# Patient Record
Sex: MALE | Race: WHITE | Employment: OTHER | URBAN - NONMETROPOLITAN AREA
[De-identification: names, ages, dates, MRNs, and addresses within clinical notes are randomized per-mention and may not be internally consistent; named-entity substitution may affect disease eponyms.]

---

## 2017-01-18 ENCOUNTER — OFFICE VISIT (OUTPATIENT)
Dept: FAMILY MEDICINE | Facility: OTHER | Age: 57
End: 2017-01-18
Attending: FAMILY MEDICINE
Payer: COMMERCIAL

## 2017-01-18 VITALS
WEIGHT: 225 LBS | SYSTOLIC BLOOD PRESSURE: 112 MMHG | BODY MASS INDEX: 29.82 KG/M2 | DIASTOLIC BLOOD PRESSURE: 72 MMHG | HEART RATE: 66 BPM | TEMPERATURE: 98.1 F | HEIGHT: 73 IN

## 2017-01-18 DIAGNOSIS — F17.201 TOBACCO ABUSE, IN REMISSION: ICD-10-CM

## 2017-01-18 DIAGNOSIS — N40.0 BENIGN NON-NODULAR PROSTATIC HYPERPLASIA, PRESENCE OF LOWER URINARY TRACT SYMPTOMS UNSPECIFIED: ICD-10-CM

## 2017-01-18 DIAGNOSIS — R35.1 NOCTURIA: ICD-10-CM

## 2017-01-18 DIAGNOSIS — Z71.89 ACP (ADVANCE CARE PLANNING): Chronic | ICD-10-CM

## 2017-01-18 DIAGNOSIS — E78.2 MIXED HYPERLIPIDEMIA: ICD-10-CM

## 2017-01-18 DIAGNOSIS — Z00.00 ROUTINE GENERAL MEDICAL EXAMINATION AT A HEALTH CARE FACILITY: Primary | ICD-10-CM

## 2017-01-18 LAB
ALBUMIN SERPL-MCNC: 4.2 G/DL (ref 3.4–5)
ALP SERPL-CCNC: 30 U/L (ref 40–150)
ALT SERPL W P-5'-P-CCNC: 35 U/L (ref 0–70)
ANION GAP SERPL CALCULATED.3IONS-SCNC: 9 MMOL/L (ref 3–14)
AST SERPL W P-5'-P-CCNC: 18 U/L (ref 0–45)
BASOPHILS # BLD AUTO: 0 10E9/L (ref 0–0.2)
BASOPHILS NFR BLD AUTO: 0.7 %
BILIRUB SERPL-MCNC: 0.5 MG/DL (ref 0.2–1.3)
BUN SERPL-MCNC: 19 MG/DL (ref 7–30)
CALCIUM SERPL-MCNC: 8.8 MG/DL (ref 8.5–10.1)
CHLORIDE SERPL-SCNC: 105 MMOL/L (ref 94–109)
CHOLEST SERPL-MCNC: 190 MG/DL
CO2 SERPL-SCNC: 27 MMOL/L (ref 20–32)
CREAT SERPL-MCNC: 1.11 MG/DL (ref 0.66–1.25)
DIFFERENTIAL METHOD BLD: NORMAL
EOSINOPHIL # BLD AUTO: 0.2 10E9/L (ref 0–0.7)
EOSINOPHIL NFR BLD AUTO: 3.9 %
ERYTHROCYTE [DISTWIDTH] IN BLOOD BY AUTOMATED COUNT: 12.5 % (ref 10–15)
GFR SERPL CREATININE-BSD FRML MDRD: 69 ML/MIN/1.7M2
GLUCOSE SERPL-MCNC: 96 MG/DL (ref 70–99)
HCT VFR BLD AUTO: 40 % (ref 40–53)
HDLC SERPL-MCNC: 59 MG/DL
HGB BLD-MCNC: 13.7 G/DL (ref 13.3–17.7)
IMM GRANULOCYTES # BLD: 0 10E9/L (ref 0–0.4)
IMM GRANULOCYTES NFR BLD: 0.2 %
LDLC SERPL CALC-MCNC: 109 MG/DL
LYMPHOCYTES # BLD AUTO: 1.8 10E9/L (ref 0.8–5.3)
LYMPHOCYTES NFR BLD AUTO: 29.9 %
MCH RBC QN AUTO: 31.1 PG (ref 26.5–33)
MCHC RBC AUTO-ENTMCNC: 34.3 G/DL (ref 31.5–36.5)
MCV RBC AUTO: 91 FL (ref 78–100)
MONOCYTES # BLD AUTO: 0.8 10E9/L (ref 0–1.3)
MONOCYTES NFR BLD AUTO: 12.8 %
NEUTROPHILS # BLD AUTO: 3.1 10E9/L (ref 1.6–8.3)
NEUTROPHILS NFR BLD AUTO: 52.5 %
NONHDLC SERPL-MCNC: 131 MG/DL
NRBC # BLD AUTO: 0 10*3/UL
NRBC BLD AUTO-RTO: 0 /100
PLATELET # BLD AUTO: 264 10E9/L (ref 150–450)
POTASSIUM SERPL-SCNC: 3.9 MMOL/L (ref 3.4–5.3)
PROT SERPL-MCNC: 7.6 G/DL (ref 6.8–8.8)
PSA SERPL-MCNC: 1.24 UG/L (ref 0–4)
RBC # BLD AUTO: 4.4 10E12/L (ref 4.4–5.9)
SODIUM SERPL-SCNC: 141 MMOL/L (ref 133–144)
TRIGL SERPL-MCNC: 108 MG/DL
WBC # BLD AUTO: 5.9 10E9/L (ref 4–11)

## 2017-01-18 PROCEDURE — 36415 COLL VENOUS BLD VENIPUNCTURE: CPT | Performed by: FAMILY MEDICINE

## 2017-01-18 PROCEDURE — 85025 COMPLETE CBC W/AUTO DIFF WBC: CPT | Performed by: FAMILY MEDICINE

## 2017-01-18 PROCEDURE — 80061 LIPID PANEL: CPT | Performed by: FAMILY MEDICINE

## 2017-01-18 PROCEDURE — 71020 ZZHC CHEST TWO VIEWS, FRONT/LAT: CPT | Mod: TC | Performed by: RADIOLOGY

## 2017-01-18 PROCEDURE — 84153 ASSAY OF PSA TOTAL: CPT | Performed by: FAMILY MEDICINE

## 2017-01-18 PROCEDURE — 80053 COMPREHEN METABOLIC PANEL: CPT | Performed by: FAMILY MEDICINE

## 2017-01-18 PROCEDURE — 99396 PREV VISIT EST AGE 40-64: CPT | Performed by: FAMILY MEDICINE

## 2017-01-18 RX ORDER — FENOFIBRATE 145 MG/1
TABLET, COATED ORAL
Qty: 90 TABLET | Refills: 3 | Status: SHIPPED | OUTPATIENT
Start: 2017-01-18 | End: 2018-01-01

## 2017-01-18 RX ORDER — DUTASTERIDE 0.5 MG/1
CAPSULE, LIQUID FILLED ORAL
Qty: 90 CAPSULE | Refills: 3 | Status: SHIPPED | OUTPATIENT
Start: 2017-01-18 | End: 2018-01-01

## 2017-01-18 ASSESSMENT — ANXIETY QUESTIONNAIRES
5. BEING SO RESTLESS THAT IT IS HARD TO SIT STILL: NOT AT ALL
GAD7 TOTAL SCORE: 0
2. NOT BEING ABLE TO STOP OR CONTROL WORRYING: NOT AT ALL
1. FEELING NERVOUS, ANXIOUS, OR ON EDGE: NOT AT ALL
6. BECOMING EASILY ANNOYED OR IRRITABLE: NOT AT ALL
3. WORRYING TOO MUCH ABOUT DIFFERENT THINGS: NOT AT ALL
7. FEELING AFRAID AS IF SOMETHING AWFUL MIGHT HAPPEN: NOT AT ALL

## 2017-01-18 ASSESSMENT — PATIENT HEALTH QUESTIONNAIRE - PHQ9: 5. POOR APPETITE OR OVEREATING: NOT AT ALL

## 2017-01-18 ASSESSMENT — PAIN SCALES - GENERAL: PAINLEVEL: NO PAIN (0)

## 2017-01-18 NOTE — NURSING NOTE
"Chief Complaint   Patient presents with     Physical       Initial /72 mmHg  Pulse 66  Temp(Src) 98.1  F (36.7  C)  Ht 6' 1.25\" (1.861 m)  Wt 225 lb (102.059 kg)  BMI 29.47 kg/m2 Estimated body mass index is 29.47 kg/(m^2) as calculated from the following:    Height as of this encounter: 6' 1.25\" (1.861 m).    Weight as of this encounter: 225 lb (102.059 kg).  BP completed using cuff size: letty Obando      "

## 2017-01-18 NOTE — PROGRESS NOTES
Joseph is a 56 year old   Chief Complaint   Patient presents with     Physical       PMHx:  Past Medical History   Diagnosis Date     Tobacco use disorder 2/8/2011     Quit 8/2013     Mixed hyperlipidemia 2/8/2011     Peyronie's disease 12/20/2011     Diverticulosis 12/20/2011 2003     BPH (benign prostatic hypertrophy) 8/14/2012     Tobacco abuse, in remission      Past Surgical History   Procedure Laterality Date     Arthroscopy knee  1980     left     Arthroscopy knee  2000     left     Colostomy  2003     Diverticulosis with perforation     Colonoscopy  2003     repeat 2013     Colon resection  2004     Colonoscopy N/A 2/5/2015     Repeat in 2025//Procedure: COLONOSCOPY;  Surgeon: Keshia Vivar MD;  Location: [facility]     These were reviewed with the patient/family.    MEDICATIONS were reviewed and are as follows:   Current Outpatient Prescriptions   Medication Sig Dispense Refill     fenofibrate 145 MG tablet TAKE 1 TABLET DAILY BY MOUT H 90 tablet 3     dutasteride (AVODART) 0.5 MG capsule TAKE 1 CAPSULE DAILY BY JOE TH - GENERIC FOR AVODART 90 capsule 3     VITAMIN E NATURAL PO Take 400 Units by mouth daily       aspirin 81 MG chewable tablet Take 1 tablet (81 mg) by mouth daily 30 tablet      [DISCONTINUED] dutasteride (AVODART) 0.5 MG capsule TAKE 1 CAPSULE DAILY BY JOE TH - GENERIC FOR AVODART 90 capsule 1     [DISCONTINUED] fenofibrate 145 MG tablet TAKE 1 TABLET DAILY BY MOUT H 90 tablet 0       ALLERGIES:  Review of patient's allergies indicates no known allergies.    Family History   Problem Relation Age of Onset     Alzheimer Disease Mother      DIABETES Father      type 2     HEART DISEASE Father      myocardial infarction     HEART DISEASE Brother 61     myocardial infarction; cause of death      Social History   Substance Use Topics     Smoking status: Former Smoker -- 1.00 packs/day for 30 years     Types: Cigarettes     Smokeless tobacco: Never Used     Alcohol Use: Yes      Comment: socially  "      Review Of Systems  Skin: negative for, rash, lumps or bumps, hair changes  Eyes: negative for any visual changes  Ears/Nose/Throat: negative for, nasal congestion, postnasal drainage, hearing loss, vertigo  Respiratory: No shortness of breath, dyspnea on exertion, cough, or hemoptysis  Cardiovascular: negative for, palpitations, tachycardia, chest pain, exertional chest pain or pressure, dyspnea on exertion, lower extremity edema and exercise intolerance  Gastrointestinal: negative for abdominal pain, change in bowel habits, blood in stools.  Genitourinary: negative for dysuria, hematuria, incontinence and sexually transmitted disease. Does have some nocturia  Musculoskeletal: negative for, joint pain, joint swelling and muscular weakness  Neurologic: negative for, headaches, syncope, local weakness, numbness or tingling of hands, numbness or tingling of feet, speech problems and memory problems  Psychiatric: negative for, excessive stress, sleep disturbance, anxiety, depression and excessive alcohol consumption  Hematologic/Lymphatic/Immunologic: negative for, chills, fever and night sweats  Endocrine: negative for, heat intolerance, night sweats, polydipsia and polyuria  PHQ-9 SCORE 12/19/2014 1/27/2016 1/18/2017   Total Score 5 - -   Total Score - 6 0       IMMUNIZATIONS reviewed.      Constitutional: healthy, alert and no distress  Blood pressure 112/72, pulse 66, temperature 98.1  F (36.7  C), height 6' 1.25\" (1.861 m), weight 225 lb (102.059 kg).  Head: Normocephalic. No masses, lesions, tenderness or abnormalities  Neck: Neck supple. No adenopathy. Thyroid symmetric, normal size,, Carotids without bruits.  ENT: ENT exam normal, no neck nodes or sinus tenderness  Cardiovascular: negative, PMI normal. No lifts, heaves, or thrills. RRR. No murmurs, clicks gallops or rub  Respiratory: negative, Percussion normal. Good diaphragmatic excursion. Lungs clear  breasts symmetric  Gastrointestinal: Abdomen soft, " non-tender. BS normal. No masses, organomegaly  : Normal external genitalia without lesions. No hernias. Prostate exam normal for age- actually slightly larger   Musculoskeletal: extremities normal- no gross deformities noted, gait normal and normal muscle tone  Skin: no suspicious lesions or rashes  Neurologic: negative  Psychiatric: mentation appears normal and affect normal/bright  Hematologic/Lymphatic/Immunologic: normal ant/post cervical, axillary, supraclavicular and inguinal nodes    Results for orders placed or performed in visit on 01/18/17 (from the past 24 hour(s))   CBC with platelets differential   Result Value Ref Range    WBC 5.9 4.0 - 11.0 10e9/L    RBC Count 4.40 4.4 - 5.9 10e12/L    Hemoglobin 13.7 13.3 - 17.7 g/dL    Hematocrit 40.0 40.0 - 53.0 %    MCV 91 78 - 100 fl    MCH 31.1 26.5 - 33.0 pg    MCHC 34.3 31.5 - 36.5 g/dL    RDW 12.5 10.0 - 15.0 %    Platelet Count 264 150 - 450 10e9/L    Diff Method Automated Method     % Neutrophils 52.5 %    % Lymphocytes 29.9 %    % Monocytes 12.8 %    % Eosinophils 3.9 %    % Basophils 0.7 %    % Immature Granulocytes 0.2 %    Nucleated RBCs 0 0 /100    Absolute Neutrophil 3.1 1.6 - 8.3 10e9/L    Absolute Lymphocytes 1.8 0.8 - 5.3 10e9/L    Absolute Monocytes 0.8 0.0 - 1.3 10e9/L    Absolute Eosinophils 0.2 0.0 - 0.7 10e9/L    Absolute Basophils 0.0 0.0 - 0.2 10e9/L    Abs Immature Granulocytes 0.0 0 - 0.4 10e9/L    Absolute Nucleated RBC 0.0    Comprehensive metabolic panel   Result Value Ref Range    Sodium 141 133 - 144 mmol/L    Potassium 3.9 3.4 - 5.3 mmol/L    Chloride 105 94 - 109 mmol/L    Carbon Dioxide 27 20 - 32 mmol/L    Anion Gap 9 3 - 14 mmol/L    Glucose 96 70 - 99 mg/dL    Urea Nitrogen 19 7 - 30 mg/dL    Creatinine 1.11 0.66 - 1.25 mg/dL    GFR Estimate 69 >60 mL/min/1.7m2    GFR Estimate If Black 83 >60 mL/min/1.7m2    Calcium 8.8 8.5 - 10.1 mg/dL    Bilirubin Total 0.5 0.2 - 1.3 mg/dL    Albumin 4.2 3.4 - 5.0 g/dL    Protein Total 7.6  6.8 - 8.8 g/dL    Alkaline Phosphatase 30 (L) 40 - 150 U/L    ALT 35 0 - 70 U/L    AST 18 0 - 45 U/L   Lipid Profile   Result Value Ref Range    Cholesterol 190 <200 mg/dL    Triglycerides 108 <150 mg/dL    HDL Cholesterol 59 >39 mg/dL    LDL Cholesterol Calculated 109 (H) <100 mg/dL    Non HDL Cholesterol 131 (H) <130 mg/dL   PSA tumor marker   Result Value Ref Range    PSA 1.24 0 - 4 ug/L       ASSESSMENT:    (Z00.00) Routine general medical examination at a health care facility  (primary encounter diagnosis)  Comment: doing well  Plan: see in a year- refuses flu shot    (E78.2) Mixed hyperlipidemia  Comment: doing well.   Plan: CBC with platelets differential, Comprehensive         metabolic panel, Lipid Profile, fenofibrate 145        MG tablet        Continue current medications and behavioral changes.     (F17.201) Tobacco abuse, in remission  Comment: doing well   Plan: CBC with platelets differential, Comprehensive         metabolic panel, Lipid Profile, XR Chest 2         Views              (R35.1) Nocturia  Comment: stable- on Avodart  Plan: PSA tumor marker            (N40.0) Benign non-nodular prostatic hyperplasia, presence of lower urinary tract symptoms unspecified  Comment: stable on avodart   Plan: PSA tumor marker, dutasteride (AVODART) 0.5 MG         capsule            (Z71.89) ACP (advance care planning)  Comment:   Plan: info given

## 2017-01-18 NOTE — MR AVS SNAPSHOT
After Visit Summary   1/18/2017    Joseph Stanford    MRN: [id number]           Patient Information     Date Of Birth          1960        Visit Information        Provider Department      1/18/2017 2:00 PM Ayo Sanchez MD [facility]        Today's Diagnoses     Routine general medical examination at a health care facility    -  1     Mixed hyperlipidemia         Tobacco abuse, in remission         Nocturia         Benign non-nodular prostatic hyperplasia, presence of lower urinary tract symptoms unspecified         ACP (advance care planning)           Care Instructions    Results for orders placed or performed in visit on 01/18/17 (from the past 24 hour(s))   CBC with platelets differential   Result Value Ref Range    WBC 5.9 4.0 - 11.0 10e9/L    RBC Count 4.40 4.4 - 5.9 10e12/L    Hemoglobin 13.7 13.3 - 17.7 g/dL    Hematocrit 40.0 40.0 - 53.0 %    MCV 91 78 - 100 fl    MCH 31.1 26.5 - 33.0 pg    MCHC 34.3 31.5 - 36.5 g/dL    RDW 12.5 10.0 - 15.0 %    Platelet Count 264 150 - 450 10e9/L    Diff Method Automated Method     % Neutrophils 52.5 %    % Lymphocytes 29.9 %    % Monocytes 12.8 %    % Eosinophils 3.9 %    % Basophils 0.7 %    % Immature Granulocytes 0.2 %    Nucleated RBCs 0 0 /100    Absolute Neutrophil 3.1 1.6 - 8.3 10e9/L    Absolute Lymphocytes 1.8 0.8 - 5.3 10e9/L    Absolute Monocytes 0.8 0.0 - 1.3 10e9/L    Absolute Eosinophils 0.2 0.0 - 0.7 10e9/L    Absolute Basophils 0.0 0.0 - 0.2 10e9/L    Abs Immature Granulocytes 0.0 0 - 0.4 10e9/L    Absolute Nucleated RBC 0.0    Comprehensive metabolic panel   Result Value Ref Range    Sodium 141 133 - 144 mmol/L    Potassium 3.9 3.4 - 5.3 mmol/L    Chloride 105 94 - 109 mmol/L    Carbon Dioxide 27 20 - 32 mmol/L    Anion Gap 9 3 - 14 mmol/L    Glucose 96 70 - 99 mg/dL    Urea Nitrogen 19 7 - 30 mg/dL    Creatinine 1.11 0.66 - 1.25 mg/dL    GFR Estimate 69 >60 mL/min/1.7m2    GFR Estimate If Black 83 >60 mL/min/1.7m2     "Calcium 8.8 8.5 - 10.1 mg/dL    Bilirubin Total 0.5 0.2 - 1.3 mg/dL    Albumin 4.2 3.4 - 5.0 g/dL    Protein Total 7.6 6.8 - 8.8 g/dL    Alkaline Phosphatase 30 (L) 40 - 150 U/L    ALT 35 0 - 70 U/L    AST 18 0 - 45 U/L   Lipid Profile   Result Value Ref Range    Cholesterol 190 <200 mg/dL    Triglycerides 108 <150 mg/dL    HDL Cholesterol 59 >39 mg/dL    LDL Cholesterol Calculated 109 (H) <100 mg/dL    Non HDL Cholesterol 131 (H) <130 mg/dL   PSA tumor marker   Result Value Ref Range    PSA 1.24 0 - 4 ug/L             Follow-ups after your visit        Who to contact     If you have questions or need follow up information about today's clinic visit or your schedule please contact [facility] directly at [phone].  Normal or non-critical lab and imaging results will be communicated to you by [employer], letter or phone within 4 business days after the clinic has received the results. If you do not hear from us within 7 days, please contact the clinic through [employer] or phone. If you have a critical or abnormal lab result, we will notify you by phone as soon as possible.  Submit refill requests through [employer] or call your pharmacy and they will forward the refill request to us. Please allow 3 business days for your refill to be completed.          Additional Information About Your Visit        [employer] Information     [employer] lets you send messages to your doctor, view your test results, renew your prescriptions, schedule appointments and more. To sign up, go to [email or web address] . Click on \"Log in\" on the left side of the screen, which will take you to the Welcome page. Then click on \"Sign up Now\" on the right side of the page.     You will be asked to enter the access code listed below, as well as some personal information. Please follow the directions to create your username and password.     Your access code is: [email or web address]  Expires: 2017  2:36 PM     Your access code will  in 90 " "days. If you need help or a new code, please call your [place] clinic or [phone].        Care EveryWhere ID     This is your Care EveryWhere ID. This could be used by other organizations to access your [place] medical records  [id number]        Your Vitals Were     Pulse Temperature Height BMI (Body Mass Index)          66 98.1  F (36.7  C) 6' 1.25\" (1.861 m) 29.47 kg/m2         Blood Pressure from Last 3 Encounters:   01/18/17 112/72   03/23/16 130/66   02/23/16 110/60    Weight from Last 3 Encounters:   01/18/17 225 lb (102.059 kg)   03/23/16 235 lb (106.595 kg)   01/27/16 235 lb (106.595 kg)              We Performed the Following     CBC with platelets differential     Comprehensive metabolic panel     Lipid Profile     PSA tumor marker     XR Chest 2 Views          Today's Medication Changes          These changes are accurate as of: 1/18/17  2:36 PM.  If you have any questions, ask your nurse or doctor.               These medicines have changed or have updated prescriptions.        Dose/Directions    dutasteride 0.5 MG capsule   Commonly known as:  AVODART   This may have changed:  See the new instructions.   Used for:  Benign non-nodular prostatic hyperplasia, presence of lower urinary tract symptoms unspecified   Changed by:  Ayo Sanchez MD        TAKE 1 CAPSULE DAILY BY JOE TH - GENERIC FOR AVODART   Quantity:  90 capsule   Refills:  3       fenofibrate 145 MG tablet   This may have changed:  See the new instructions.   Used for:  Mixed hyperlipidemia   Changed by:  Ayo Sanchez MD        TAKE 1 TABLET DAILY BY MOUT H   Quantity:  90 tablet   Refills:  3            Where to get your medicines      These medications were sent to [id number] - [place] Ramachandran - [address]  [address] [place] [address]     Phone:  [phone]    - dutasteride 0.5 MG capsule  - fenofibrate 145 MG tablet             Primary Care Provider Office Phone # Fax #    Ayo Sanchez MD " [phone]       [facility] [address] [place] [address]        Thank you!     [facility] you for [facility]  [facility]. Our goal is always to provide you with excellent care. Hearing back from our patients is one way we can continue to improve our services. Please take a few minutes to complete the written survey that you may receive in the mail after your visit with us. Thank you!             Your Updated Medication List - Protect others around you: Learn how to safely use, store and throw away your medicines at www.disposemymeds.org.          This list is accurate as of: 1/18/17  2:36 PM.  Always use your most recent med list.                   Brand Name Dispense Instructions for use    aspirin 81 MG chewable tablet     30 tablet    Take 1 tablet (81 mg) by mouth daily       dutasteride 0.5 MG capsule    AVODART    90 capsule    TAKE 1 CAPSULE DAILY BY JOE TH - GENERIC FOR AVODART       fenofibrate 145 MG tablet     90 tablet    TAKE 1 TABLET DAILY BY MOUT H       VITAMIN E NATURAL PO      Take 400 Units by mouth daily

## 2017-01-18 NOTE — PATIENT INSTRUCTIONS
Results for orders placed or performed in visit on 01/18/17 (from the past 24 hour(s))   CBC with platelets differential   Result Value Ref Range    WBC 5.9 4.0 - 11.0 10e9/L    RBC Count 4.40 4.4 - 5.9 10e12/L    Hemoglobin 13.7 13.3 - 17.7 g/dL    Hematocrit 40.0 40.0 - 53.0 %    MCV 91 78 - 100 fl    MCH 31.1 26.5 - 33.0 pg    MCHC 34.3 31.5 - 36.5 g/dL    RDW 12.5 10.0 - 15.0 %    Platelet Count 264 150 - 450 10e9/L    Diff Method Automated Method     % Neutrophils 52.5 %    % Lymphocytes 29.9 %    % Monocytes 12.8 %    % Eosinophils 3.9 %    % Basophils 0.7 %    % Immature Granulocytes 0.2 %    Nucleated RBCs 0 0 /100    Absolute Neutrophil 3.1 1.6 - 8.3 10e9/L    Absolute Lymphocytes 1.8 0.8 - 5.3 10e9/L    Absolute Monocytes 0.8 0.0 - 1.3 10e9/L    Absolute Eosinophils 0.2 0.0 - 0.7 10e9/L    Absolute Basophils 0.0 0.0 - 0.2 10e9/L    Abs Immature Granulocytes 0.0 0 - 0.4 10e9/L    Absolute Nucleated RBC 0.0    Comprehensive metabolic panel   Result Value Ref Range    Sodium 141 133 - 144 mmol/L    Potassium 3.9 3.4 - 5.3 mmol/L    Chloride 105 94 - 109 mmol/L    Carbon Dioxide 27 20 - 32 mmol/L    Anion Gap 9 3 - 14 mmol/L    Glucose 96 70 - 99 mg/dL    Urea Nitrogen 19 7 - 30 mg/dL    Creatinine 1.11 0.66 - 1.25 mg/dL    GFR Estimate 69 >60 mL/min/1.7m2    GFR Estimate If Black 83 >60 mL/min/1.7m2    Calcium 8.8 8.5 - 10.1 mg/dL    Bilirubin Total 0.5 0.2 - 1.3 mg/dL    Albumin 4.2 3.4 - 5.0 g/dL    Protein Total 7.6 6.8 - 8.8 g/dL    Alkaline Phosphatase 30 (L) 40 - 150 U/L    ALT 35 0 - 70 U/L    AST 18 0 - 45 U/L   Lipid Profile   Result Value Ref Range    Cholesterol 190 <200 mg/dL    Triglycerides 108 <150 mg/dL    HDL Cholesterol 59 >39 mg/dL    LDL Cholesterol Calculated 109 (H) <100 mg/dL    Non HDL Cholesterol 131 (H) <130 mg/dL   PSA tumor marker   Result Value Ref Range    PSA 1.24 0 - 4 ug/L

## 2017-01-19 ASSESSMENT — PATIENT HEALTH QUESTIONNAIRE - PHQ9: SUM OF ALL RESPONSES TO PHQ QUESTIONS 1-9: 0

## 2017-01-19 ASSESSMENT — ANXIETY QUESTIONNAIRES: GAD7 TOTAL SCORE: 0

## 2018-03-20 ENCOUNTER — RADIANT APPOINTMENT (OUTPATIENT)
Dept: GENERAL RADIOLOGY | Facility: OTHER | Age: 58
End: 2018-03-20
Attending: FAMILY MEDICINE
Payer: COMMERCIAL

## 2018-03-20 ENCOUNTER — OFFICE VISIT (OUTPATIENT)
Dept: FAMILY MEDICINE | Facility: OTHER | Age: 58
End: 2018-03-20
Attending: FAMILY MEDICINE
Payer: COMMERCIAL

## 2018-03-20 ENCOUNTER — APPOINTMENT (OUTPATIENT)
Dept: LAB | Facility: OTHER | Age: 58
End: 2018-03-20
Attending: FAMILY MEDICINE
Payer: COMMERCIAL

## 2018-03-20 VITALS
DIASTOLIC BLOOD PRESSURE: 76 MMHG | HEART RATE: 77 BPM | SYSTOLIC BLOOD PRESSURE: 128 MMHG | WEIGHT: 229 LBS | TEMPERATURE: 97.4 F | BODY MASS INDEX: 29.39 KG/M2 | HEIGHT: 74 IN | OXYGEN SATURATION: 99 %

## 2018-03-20 DIAGNOSIS — N40.0 HYPERTROPHY OF PROSTATE WITHOUT URINARY OBSTRUCTION: ICD-10-CM

## 2018-03-20 DIAGNOSIS — Z00.00 ROUTINE GENERAL MEDICAL EXAMINATION AT A HEALTH CARE FACILITY: Primary | ICD-10-CM

## 2018-03-20 DIAGNOSIS — F17.201 TOBACCO ABUSE, IN REMISSION: ICD-10-CM

## 2018-03-20 DIAGNOSIS — E78.2 MIXED HYPERLIPIDEMIA: ICD-10-CM

## 2018-03-20 DIAGNOSIS — N40.0 BENIGN NON-NODULAR PROSTATIC HYPERPLASIA: ICD-10-CM

## 2018-03-20 DIAGNOSIS — R35.1 NOCTURIA: ICD-10-CM

## 2018-03-20 LAB
ALBUMIN SERPL-MCNC: 4.3 G/DL (ref 3.4–5)
ALP SERPL-CCNC: 30 U/L (ref 40–150)
ALT SERPL W P-5'-P-CCNC: 34 U/L (ref 0–70)
ANION GAP SERPL CALCULATED.3IONS-SCNC: 5 MMOL/L (ref 3–14)
AST SERPL W P-5'-P-CCNC: 18 U/L (ref 0–45)
BASOPHILS # BLD AUTO: 0.1 10E9/L (ref 0–0.2)
BASOPHILS NFR BLD AUTO: 1 %
BILIRUB SERPL-MCNC: 0.4 MG/DL (ref 0.2–1.3)
BUN SERPL-MCNC: 20 MG/DL (ref 7–30)
CALCIUM SERPL-MCNC: 9.3 MG/DL (ref 8.5–10.1)
CHLORIDE SERPL-SCNC: 103 MMOL/L (ref 94–109)
CHOLEST SERPL-MCNC: 217 MG/DL
CO2 SERPL-SCNC: 30 MMOL/L (ref 20–32)
CREAT SERPL-MCNC: 1.06 MG/DL (ref 0.66–1.25)
DIFFERENTIAL METHOD BLD: NORMAL
EOSINOPHIL # BLD AUTO: 0.1 10E9/L (ref 0–0.7)
EOSINOPHIL NFR BLD AUTO: 2.7 %
ERYTHROCYTE [DISTWIDTH] IN BLOOD BY AUTOMATED COUNT: 12.4 % (ref 10–15)
GFR SERPL CREATININE-BSD FRML MDRD: 72 ML/MIN/1.7M2
GLUCOSE SERPL-MCNC: 97 MG/DL (ref 70–99)
HCT VFR BLD AUTO: 42.3 % (ref 40–53)
HDLC SERPL-MCNC: 55 MG/DL
HGB BLD-MCNC: 14.6 G/DL (ref 13.3–17.7)
IMM GRANULOCYTES # BLD: 0 10E9/L (ref 0–0.4)
IMM GRANULOCYTES NFR BLD: 0.4 %
LDLC SERPL CALC-MCNC: 111 MG/DL
LYMPHOCYTES # BLD AUTO: 1.5 10E9/L (ref 0.8–5.3)
LYMPHOCYTES NFR BLD AUTO: 29.8 %
MCH RBC QN AUTO: 31.7 PG (ref 26.5–33)
MCHC RBC AUTO-ENTMCNC: 34.5 G/DL (ref 31.5–36.5)
MCV RBC AUTO: 92 FL (ref 78–100)
MONOCYTES # BLD AUTO: 0.8 10E9/L (ref 0–1.3)
MONOCYTES NFR BLD AUTO: 16 %
NEUTROPHILS # BLD AUTO: 2.4 10E9/L (ref 1.6–8.3)
NEUTROPHILS NFR BLD AUTO: 50.1 %
NONHDLC SERPL-MCNC: 162 MG/DL
NRBC # BLD AUTO: 0 10*3/UL
NRBC BLD AUTO-RTO: 0 /100
PLATELET # BLD AUTO: 272 10E9/L (ref 150–450)
POTASSIUM SERPL-SCNC: 4 MMOL/L (ref 3.4–5.3)
PROT SERPL-MCNC: 8 G/DL (ref 6.8–8.8)
PSA SERPL-MCNC: 0.5 UG/L (ref 0–4)
RBC # BLD AUTO: 4.61 10E12/L (ref 4.4–5.9)
SODIUM SERPL-SCNC: 138 MMOL/L (ref 133–144)
TRIGL SERPL-MCNC: 254 MG/DL
WBC # BLD AUTO: 4.9 10E9/L (ref 4–11)

## 2018-03-20 PROCEDURE — 71046 X-RAY EXAM CHEST 2 VIEWS: CPT | Mod: TC

## 2018-03-20 PROCEDURE — 99396 PREV VISIT EST AGE 40-64: CPT | Performed by: FAMILY MEDICINE

## 2018-03-20 PROCEDURE — 84153 ASSAY OF PSA TOTAL: CPT | Performed by: FAMILY MEDICINE

## 2018-03-20 PROCEDURE — 80053 COMPREHEN METABOLIC PANEL: CPT | Performed by: FAMILY MEDICINE

## 2018-03-20 PROCEDURE — 36415 COLL VENOUS BLD VENIPUNCTURE: CPT | Performed by: FAMILY MEDICINE

## 2018-03-20 PROCEDURE — 85025 COMPLETE CBC W/AUTO DIFF WBC: CPT | Performed by: FAMILY MEDICINE

## 2018-03-20 PROCEDURE — 80061 LIPID PANEL: CPT | Performed by: FAMILY MEDICINE

## 2018-03-20 RX ORDER — FENOFIBRATE 145 MG/1
TABLET, COATED ORAL
Qty: 90 TABLET | Refills: 3 | Status: SHIPPED | OUTPATIENT
Start: 2018-03-20 | End: 2019-01-09

## 2018-03-20 RX ORDER — DUTASTERIDE 0.5 MG/1
CAPSULE, LIQUID FILLED ORAL
Qty: 90 CAPSULE | Refills: 3 | Status: SHIPPED | OUTPATIENT
Start: 2018-03-20 | End: 2019-01-09

## 2018-03-20 ASSESSMENT — ANXIETY QUESTIONNAIRES
6. BECOMING EASILY ANNOYED OR IRRITABLE: NOT AT ALL
GAD7 TOTAL SCORE: 0
2. NOT BEING ABLE TO STOP OR CONTROL WORRYING: NOT AT ALL
5. BEING SO RESTLESS THAT IT IS HARD TO SIT STILL: NOT AT ALL
4. TROUBLE RELAXING: NOT AT ALL
7. FEELING AFRAID AS IF SOMETHING AWFUL MIGHT HAPPEN: NOT AT ALL
1. FEELING NERVOUS, ANXIOUS, OR ON EDGE: NOT AT ALL
3. WORRYING TOO MUCH ABOUT DIFFERENT THINGS: NOT AT ALL

## 2018-03-20 ASSESSMENT — PAIN SCALES - GENERAL: PAINLEVEL: NO PAIN (0)

## 2018-03-20 NOTE — NURSING NOTE
"Chief Complaint   Patient presents with     Physical       Initial /76  Pulse 77  Temp 97.4  F (36.3  C)  Ht 6' 1.5\" (1.867 m)  Wt 229 lb (103.9 kg)  SpO2 99%  BMI 29.8 kg/m2 Estimated body mass index is 29.8 kg/(m^2) as calculated from the following:    Height as of this encounter: 6' 1.5\" (1.867 m).    Weight as of this encounter: 229 lb (103.9 kg).  Medication Reconciliation: complete     Price Obando        "

## 2018-03-20 NOTE — MR AVS SNAPSHOT
After Visit Summary   3/20/2018    Joseph Stanford    MRN: [id number]           Patient Information     Date Of Birth          1960        Visit Information        Provider Department      3/20/2018 10:00 AM Ayo Sanchez MD [facility]        Today's Diagnoses     Routine general medical examination at a health care facility    -  1    Mixed hyperlipidemia        Nocturia        Hypertrophy of prostate without urinary obstruction        Tobacco abuse, in remission        Benign non-nodular prostatic hyperplasia          Care Instructions      Preventive Health Recommendations  Male Ages 50 - 64    Yearly exam:             See your health care provider every year in order to  o   Review health changes.   o   Discuss preventive care.    o   Review your medicines if your doctor has prescribed any.     Have a cholesterol test every 5 years, or more frequently if you are at risk for high cholesterol/heart disease.     Have a diabetes test (fasting glucose) every three years. If you are at risk for diabetes, you should have this test more often.     Have a colonoscopy at age 50, or have a yearly FIT test (stool test). These exams will check for colon cancer.      Talk with your health care provider about whether or not a prostate cancer screening test (PSA) is right for you.    You should be tested each year for STDs (sexually transmitted diseases), if you re at risk.     Shots: Get a flu shot each year. Get a tetanus shot every 10 years.     Nutrition:    Eat at least 5 servings of fruits and vegetables daily.     Eat whole-grain bread, whole-wheat pasta and brown rice instead of white grains and rice.     Talk to your provider about Calcium and Vitamin D.     Lifestyle    Exercise for at least 150 minutes a week (30 minutes a day, 5 days a week). This will help you control your weight and prevent disease.     Limit alcohol to one drink per day.     No smoking.     Wear sunscreen to  "prevent skin cancer.     See your dentist every six months for an exam and cleaning.     See your eye doctor every 1 to 2 years.  Results for orders placed or performed in visit on 03/20/18 (from the past 24 hour(s))   XR Chest 2 Views    Narrative    PROCEDURE: XR CHEST 2 VW 3/20/2018 9:44 AM    HISTORY: ; Tobacco abuse, in remission    COMPARISONS: 1/18/2017.    TECHNIQUE: 2 views.    FINDINGS: Heart and pulmonary vasculature are normal. Lungs are clear  and no pleural effusion is seen.         Impression    IMPRESSION: No acute disease.    DANIEL SEWELL MD     WILL CALL WITH LABS           Follow-ups after your visit        Who to contact     If you have questions or need follow up information about today's clinic visit or your schedule please contact [facility] directly at [phone].  Normal or non-critical lab and imaging results will be communicated to you by MyChart, letter or phone within 4 business days after the clinic has received the results. If you do not hear from us within 7 days, please contact the clinic through MyChart or phone. If you have a critical or abnormal lab result, we will notify you by phone as soon as possible.  Submit refill requests through [employer] or call your pharmacy and they will forward the refill request to us. Please allow 3 business days for your refill to be completed.          Additional Information About Your Visit        [employer] Information     [employer] lets you send messages to your doctor, view your test results, renew your prescriptions, schedule appointments and more. To sign up, go to [email or web address] . Click on \"Log in\" on the left side of the screen, which will take you to the Welcome page. Then click on \"Sign up Now\" on the right side of the page.     You will be asked to enter the access code listed below, as well as some personal information. Please follow the directions to create your username and password.     Your access code is: " [email or web address]  Expires: 2018 10:53 AM     Your access code will  in 90 days. If you need help or a new code, please call your [facility] clinic or [phone].        Care EveryWhere ID     This is your Care EveryWhere ID. This could be used by other organizations to access your [facility] medical records  [id number]        Your Vitals Were     Pulse Temperature Height Pulse Oximetry BMI (Body Mass Index)       77 97.4  F (36.3  C) 6' 1.5\" (1.867 m) 99% 29.8 kg/m2        Blood Pressure from Last 3 Encounters:   18 128/76   17 112/72   16 130/66    Weight from Last 3 Encounters:   18 229 lb (103.9 kg)   17 225 lb (102.1 kg)   16 235 lb (106.6 kg)              We Performed the Following     OFFICE/OUTPT VISIT,WILLIAN GARCIA III          Today's Medication Changes          These changes are accurate as of 3/20/18 10:53 AM.  If you have any questions, ask your nurse or doctor.               These medicines have changed or have updated prescriptions.        Dose/Directions    dutasteride 0.5 MG capsule   Commonly known as:  AVODART   This may have changed:  See the new instructions.   Used for:  Benign non-nodular prostatic hyperplasia   Changed by:  Ayo Sanchez MD        TAKE 1 CAPSULE BY MOUTH ONCE DAILY - GENERIC FOR AVODART   Quantity:  90 capsule   Refills:  3       fenofibrate 145 MG tablet   This may have changed:  See the new instructions.   Used for:  Mixed hyperlipidemia   Changed by:  Ayo Sanchez MD        TAKE 1 TABLET DAILY BY MOUTH   Quantity:  90 tablet   Refills:  3            Where to get your medicines      These medications were sent to [id number] - [place] - [address]  [address] [address]     Phone:  [phone]     dutasteride 0.5 MG capsule    fenofibrate 145 MG tablet                Primary Care Provider Office Phone # Fax #    Ayo Sanchez [phone] [address] [facility]  [place] " [id number]        Equal Access to Services     [facility] : Hadii aad ku hadhueygaviota Samali, washeilada luqadaha, qaybta karufinobrandon mensah. So [facility] [phone].    ATENCIÓN: Si habla español, tiene a akers disposición servicios gratuitos de asistencia lingüística. Llame al [phone].    We comply with applicable federal civil rights laws and [place] laws. We do not discriminate on the basis of race, color, national origin, age, disability, sex, sexual orientation, or gender identity.            Thank you!     Thank you for choosing [facility]  for your care. Our goal is always to provide you with excellent care. Hearing back from our patients is one way we can continue to improve our services. Please take a few minutes to complete the written survey that you may receive in the mail after your visit with us. Thank you!             Your Updated Medication List - Protect others around you: Learn how to safely use, store and throw away your medicines at www.disposemymeds.org.          This list is accurate as of 3/20/18 10:53 AM.  Always use your most recent med list.                   Brand Name Dispense Instructions for use Diagnosis    aspirin 81 MG chewable tablet     30 tablet    Take 1 tablet (81 mg) by mouth daily        dutasteride 0.5 MG capsule    AVODART    90 capsule    TAKE 1 CAPSULE BY MOUTH ONCE DAILY - GENERIC FOR AVODART    Benign non-nodular prostatic hyperplasia       fenofibrate 145 MG tablet     90 tablet    TAKE 1 TABLET DAILY BY MOUTH    Mixed hyperlipidemia       VITAMIN E NATURAL PO      Take 400 Units by mouth daily

## 2018-03-20 NOTE — PATIENT INSTRUCTIONS
Preventive Health Recommendations  Male Ages 50   64    Yearly exam:             See your health care provider every year in order to  o   Review health changes.   o   Discuss preventive care.    o   Review your medicines if your doctor has prescribed any.     Have a cholesterol test every 5 years, or more frequently if you are at risk for high cholesterol/heart disease.     Have a diabetes test (fasting glucose) every three years. If you are at risk for diabetes, you should have this test more often.     Have a colonoscopy at age 50, or have a yearly FIT test (stool test). These exams will check for colon cancer.      Talk with your health care provider about whether or not a prostate cancer screening test (PSA) is right for you.    You should be tested each year for STDs (sexually transmitted diseases), if you re at risk.     Shots: Get a flu shot each year. Get a tetanus shot every 10 years.     Nutrition:    Eat at least 5 servings of fruits and vegetables daily.     Eat whole-grain bread, whole-wheat pasta and brown rice instead of white grains and rice.     Talk to your provider about Calcium and Vitamin D.     Lifestyle    Exercise for at least 150 minutes a week (30 minutes a day, 5 days a week). This will help you control your weight and prevent disease.     Limit alcohol to one drink per day.     No smoking.     Wear sunscreen to prevent skin cancer.     See your dentist every six months for an exam and cleaning.     See your eye doctor every 1 to 2 years.  Results for orders placed or performed in visit on 03/20/18 (from the past 24 hour(s))   XR Chest 2 Views    Narrative    PROCEDURE: XR CHEST 2 VW 3/20/2018 9:44 AM    HISTORY: ; Tobacco abuse, in remission    COMPARISONS: 1/18/2017.    TECHNIQUE: 2 views.    FINDINGS: Heart and pulmonary vasculature are normal. Lungs are clear  and no pleural effusion is seen.         Impression    IMPRESSION: No acute disease.    DANIEL SEWELL MD     WILL CALL  WITH LABS

## 2018-03-20 NOTE — PROGRESS NOTES
SUBJECTIVE:   CC: Joseph Stanford is an 58 year old male who presents for preventative health visit.     Healthy Habits:    Do you get at least three servings of calcium containing foods daily (dairy, green leafy vegetables, etc.)? yes    Amount of exercise or daily activities, outside of work: no    Problems taking medications regularly No    Medication side effects: No    Have you had an eye exam in the past two years? no    Do you see a dentist twice per year? no    Do you have sleep apnea, excessive snoring or daytime drowsiness?yes, snoring       Hyperlipidemia Follow-Up      Rate your low fat/cholesterol diet?: fair    Taking statin?  Yes, no muscle aches from statin    Other lipid medications/supplements?:  none    PSA recheck    Today's PHQ-2 Score: No flowsheet data found.   PHQ-9 SCORE 1/27/2016 1/18/2017 3/20/2018   Total Score - - -   Total Score 6 0 0     RENETTA-7 SCORE 1/18/2017 3/20/2018   Total Score 0 0           Abuse: Current or Past(Physical, Sexual or Emotional)- No  Do you feel safe in your environment - Yes    Social History   Substance Use Topics     Smoking status: Former Smoker     Packs/day: 1.00     Years: 30.00     Types: Cigarettes     Smokeless tobacco: Never Used     Alcohol use Yes      Comment: socially      If you drink alcohol do you typically have >3 drinks per day or >7 drinks per week? Yes - AUDIT SCORE:     No flowsheet data found.                      Last PSA:   PSA   Date Value Ref Range Status   01/18/2017 1.24 0 - 4 ug/L Final     Comment:     Assay Method:  Chemiluminescence using Siemens Vista analyzer       Reviewed orders with patient. Reviewed health maintenance and updated orders accordingly - Yes  Labs reviewed in EPIC    Reviewed and updated as needed this visit by clinical staff  Tobacco  Allergies  Meds  Med Hx  Surg Hx  Fam Hx  Soc Hx        Reviewed and updated as needed this visit by Provider        Past Medical History:   Diagnosis Date     BPH (benign  "prostatic hypertrophy) 8/14/2012     Diverticulosis 12/20/2011 2003     Mixed hyperlipidemia 2/8/2011     Peyronie's disease 12/20/2011     Tobacco abuse, in remission      Tobacco use disorder 2/8/2011    Quit 8/2013      Past Surgical History:   Procedure Laterality Date     ARTHROSCOPY KNEE  1980    left     ARTHROSCOPY KNEE  2000    left     colon resection  2004     COLONOSCOPY  2003    repeat 2013     COLONOSCOPY N/A 2/5/2015    Repeat in 2025//Procedure: COLONOSCOPY;  Surgeon: Keshia Vivar MD;  Location: [facility]     COLOSTOMY  2003    Diverticulosis with perforation       ROS:  C: NEGATIVE for fever, chills, change in weight  I: NEGATIVE for worrisome rashes, moles or lesions  E: NEGATIVE for vision changes or irritation  ENT: NEGATIVE for ear, mouth and throat problems  R: NEGATIVE for significant cough or SOB  CV: NEGATIVE for chest pain, palpitations or peripheral edema  GI: NEGATIVE for nausea, abdominal pain, heartburn, or change in bowel habits   male: negative for dysuria, hematuria, decreased urinary stream, erectile dysfunction, urethral discharge  M: NEGATIVE for significant arthralgias or myalgia  N: NEGATIVE for weakness, dizziness or paresthesias  P: NEGATIVE for changes in mood or affect    OBJECTIVE:   /76  Pulse 77  Temp 97.4  F (36.3  C)  Ht 6' 1.5\" (1.867 m)  Wt 229 lb (103.9 kg)  SpO2 99%  BMI 29.8 kg/m2  EXAM:  GENERAL: healthy, alert and no distress  EYES: Eyes grossly normal to inspection, PERRL and conjunctivae and sclerae normal  HENT: ear canals and TM's normal, nose and mouth without ulcers or lesions  NECK: no adenopathy, no asymmetry, masses, or scars and thyroid normal to palpation  RESP: lungs clear to auscultation - no rales, rhonchi or wheezes  CV: regular rate and rhythm, normal S1 S2, no S3 or S4, no murmur, click or rub, no peripheral edema and peripheral pulses strong  ABDOMEN: soft, nontender, no hepatosplenomegaly, no masses and bowel sounds normal   " (male): normal male genitalia without lesions or urethral discharge, no hernia  RECTAL: normal sphincter tone, no rectal masses, prostate normal size, smooth, nontender without nodules or masses  MS: no gross musculoskeletal defects noted, no edema  SKIN: no suspicious lesions or rashes  NEURO: Normal strength and tone, mentation intact and speech normal  PSYCH: mentation appears normal, affect normal/bright  Results for orders placed or performed in visit on 03/20/18 (from the past 24 hour(s))   XR Chest 2 Views    Narrative    PROCEDURE: XR CHEST 2 VW 3/20/2018 9:44 AM    HISTORY: ; Tobacco abuse, in remission    COMPARISONS: 1/18/2017.    TECHNIQUE: 2 views.    FINDINGS: Heart and pulmonary vasculature are normal. Lungs are clear  and no pleural effusion is seen.         Impression    IMPRESSION: No acute disease.    DANIEL SEWELL MD       ASSESSMENT/PLAN:   1. Routine general medical examination at a health care facility  Overall doing well. Retiring     2. Mixed hyperlipidemia  Stable on meds. Continue current medications and behavioral changes.   - CBC with platelets differential; Future  - Comprehensive metabolic panel; Future  - Lipid Profile; Future  - CBC with platelets differential  - Comprehensive metabolic panel  - Lipid Profile  - fenofibrate 145 MG tablet; TAKE 1 TABLET DAILY BY MOUTH  Dispense: 90 tablet; Refill: 3    3. Nocturia  Stable on avodart   - PSA tumor marker; Future  - PSA tumor marker    4. Hypertrophy of prostate without urinary obstruction  Stable   - PSA tumor marker; Future  - PSA tumor marker    5. Tobacco abuse, in remission  CXR ok   - XR Chest 2 Views; Future    6. Benign non-nodular prostatic hyperplasia  Stable - Continue current medications and behavioral changes.   - dutasteride (AVODART) 0.5 MG capsule; TAKE 1 CAPSULE BY MOUTH ONCE DAILY - GENERIC FOR AVODART  Dispense: 90 capsule; Refill: 3    COUNSELING:  Reviewed preventive health counseling, as reflected in patient  "instructions       Regular exercise       Healthy diet/nutrition       Prostate cancer screening       reports that he has quit smoking. His smoking use included Cigarettes. He has a 30.00 pack-year smoking history. He has never used smokeless tobacco.    Estimated body mass index is 29.48 kg/(m^2) as calculated from the following:    Height as of 1/18/17: 6' 1.25\" (1.861 m).    Weight as of 1/18/17: 225 lb (102.1 kg).       Counseling Resources:  ATP IV Guidelines  Pooled Cohorts Equation Calculator  FRAX Risk Assessment  ICSI Preventive Guidelines  Dietary Guidelines for Americans, 2010  USDA's MyPlate  ASA Prophylaxis  Lung CA Screening    Ayo Sanchez MD  [facility] [place]  "

## 2018-03-21 ASSESSMENT — ANXIETY QUESTIONNAIRES: GAD7 TOTAL SCORE: 0

## 2018-03-21 ASSESSMENT — PATIENT HEALTH QUESTIONNAIRE - PHQ9: SUM OF ALL RESPONSES TO PHQ QUESTIONS 1-9: 0

## 2019-01-02 NOTE — PROGRESS NOTES
SUBJECTIVE:   CC: Joseph Stanford is an 58 year old male who presents for preventive health visit.     Healthy Habits:    Do you get at least three servings of calcium containing foods daily (dairy, green leafy vegetables, etc.)? yes    Amount of exercise or daily activities, outside of work: walking 1 mile per day with stretching every day(s) per week    Problems taking medications regularly No    Medication side effects: No    Have you had an eye exam in the past two years? yes    Do you see a dentist twice per year? yes    Do you have sleep apnea, excessive snoring or daytime drowsiness?no      Hyperlipidemia Follow-Up      Rate your low fat/cholesterol diet?: fair    Taking statin?  No    Other lipid medications/supplements?:  none  Musculoskeletal problem/pain      Duration: 1-1.5 months    Description  Location: left shoulder- down left arm - to fingers 3-5th fingers    Intensity:  mild, moderate    Accompanying signs and symptoms: numbness and tingling    History  Previous similar problem: no   Previous evaluation:  none    Precipitating or alleviating factors:  Trauma or overuse: YES- feels back needs adjusting  Aggravating factors include: sleeping on that side    Therapies tried and outcome: stretching and exercises    Comes  and goes    After sleeping - worse    Some pain in shoulder blade    No recent injury    Worked construction    Right handed     Some cervical ddd sx    Weather plays a role                 Today's PHQ-2 Score: No flowsheet data found.   PHQ-9 SCORE 1/18/2017 3/20/2018 1/9/2019   PHQ-9 Total Score - - -   PHQ-9 Total Score 0 0 0     RENETTA-7 SCORE 1/18/2017 3/20/2018 1/9/2019   Total Score 0 0 0           Abuse: Current or Past(Physical, Sexual or Emotional)- No  Do you feel safe in your environment? Yes    Social History     Tobacco Use     Smoking status: Former Smoker     Packs/day: 1.00     Years: 30.00     Pack years: 30.00     Types: Cigarettes     Smokeless tobacco: Never Used    Substance Use Topics     Alcohol use: Yes     Comment: socially     If you drink alcohol do you typically have >3 drinks per day or >7 drinks per week? Yes - AUDIT SCORE:                           Last PSA:   PSA   Date Value Ref Range Status   03/20/2018 0.50 0 - 4 ug/L Final     Comment:     Assay Method:  Chemiluminescence using Siemens Vista analyzer       Reviewed orders with patient. Reviewed health maintenance and updated orders accordingly - Yes  Labs reviewed in EPIC    Reviewed and updated as needed this visit by clinical staff         Reviewed and updated as needed this visit by Provider        Past Medical History:   Diagnosis Date     BPH (benign prostatic hypertrophy) 8/14/2012     Diverticulosis 12/20/2011    2003     Mixed hyperlipidemia 2/8/2011     Peyronie's disease 12/20/2011     Tobacco abuse, in remission      Tobacco use disorder 2/8/2011    Quit 8/2013      Past Surgical History:   Procedure Laterality Date     ARTHROSCOPY KNEE  1980    left     ARTHROSCOPY KNEE  2000    left     colon resection  2004     COLONOSCOPY  2003    repeat 2013     COLONOSCOPY N/A 2/5/2015    Repeat in 2025//Procedure: COLONOSCOPY;  Surgeon: Keshia Vivar MD;  Location: [facility]     COLOSTOMY  2003    Diverticulosis with perforation       ROS:  CONSTITUTIONAL: NEGATIVE for fever, chills, change in weight  INTEGUMENTARY/SKIN: NEGATIVE for worrisome rashes, moles or lesions  EYES: NEGATIVE for vision changes or irritation  ENT: NEGATIVE for ear, mouth and throat problems  RESP: NEGATIVE for significant cough or SOB  CV: NEGATIVE for chest pain, palpitations or peripheral edema  GI: NEGATIVE for nausea, abdominal pain, heartburn, or change in bowel habits   male: negative for dysuria, hematuria, decreased urinary stream, erectile dysfunction, urethral discharge  MUSCULOSKELETAL: NEGATIVE for significant arthralgias or myalgia  NEURO: NEGATIVE for weakness, dizziness or paresthesias  PSYCHIATRIC: NEGATIVE for  "changes in mood or affect    OBJECTIVE:   /70   Pulse 71   Temp 97.4  F (36.3  C)   Ht 1.88 m (6' 2\")   Wt 99.8 kg (220 lb)   SpO2 98%   BMI 28.25 kg/m    EXAM:  GENERAL: healthy, alert and no distress  EYES: Eyes grossly normal to inspection, PERRL and conjunctivae and sclerae normal  HENT: ear canals and TM's normal, nose and mouth without ulcers or lesions  NECK: no adenopathy, no asymmetry, masses, or scars and thyroid normal to palpation  RESP: lungs clear to auscultation - no rales, rhonchi or wheezes  CV: regular rate and rhythm, normal S1 S2, no S3 or S4, no murmur, click or rub, no peripheral edema and peripheral pulses strong  ABDOMEN: soft, nontender, no hepatosplenomegaly, no masses and bowel sounds normal   (male): normal male genitalia without lesions or urethral discharge, no hernia  RECTAL: normal sphincter tone, no rectal masses, prostate normal size, smooth, nontender without nodules or masses  MS: no gross musculoskeletal defects noted, no edema  SKIN: no suspicious lesions or rashes  NEURO: Normal strength and tone, mentation intact and speech normal  PSYCH: mentation appears normal, affect normal/bright    Results for orders placed or performed in visit on 01/09/19   XR Chest 2 Views    Narrative    PROCEDURE:  XR CHEST 2 VW    HISTORY:  Tobacco abuse, in remission.     COMPARISON:  None.    FINDINGS:   The cardiac silhouette is normal in size. The pulmonary vasculature is  normal.  The lungs are clear. No pleural effusion or pneumothorax.      Impression    IMPRESSION:  No acute cardiopulmonary disease.      ASHLEY HELM MD   PSA Diagnostic   Result Value Ref Range    PSA 0.47 0 - 4 ug/L   Comprehensive metabolic panel   Result Value Ref Range    Sodium 140 133 - 144 mmol/L    Potassium 3.7 3.4 - 5.3 mmol/L    Chloride 105 94 - 109 mmol/L    Carbon Dioxide 29 20 - 32 mmol/L    Anion Gap 6 3 - 14 mmol/L    Glucose 95 70 - 99 mg/dL    Urea Nitrogen 20 7 - 30 mg/dL    Creatinine " 1.11 0.66 - 1.25 mg/dL    GFR Estimate 73 >60 mL/min/[1.73_m2]    GFR Estimate If Black 84 >60 mL/min/[1.73_m2]    Calcium 8.9 8.5 - 10.1 mg/dL    Bilirubin Total 0.5 0.2 - 1.3 mg/dL    Albumin 4.2 3.4 - 5.0 g/dL    Protein Total 7.4 6.8 - 8.8 g/dL    Alkaline Phosphatase 24 (L) 40 - 150 U/L    ALT 36 0 - 70 U/L    AST 17 0 - 45 U/L   CBC with platelets and differential   Result Value Ref Range    WBC 4.4 4.0 - 11.0 10e9/L    RBC Count 4.38 (L) 4.4 - 5.9 10e12/L    Hemoglobin 13.8 13.3 - 17.7 g/dL    Hematocrit 39.9 (L) 40.0 - 53.0 %    MCV 91 78 - 100 fl    MCH 31.5 26.5 - 33.0 pg    MCHC 34.6 31.5 - 36.5 g/dL    RDW 12.1 10.0 - 15.0 %    Platelet Count 283 150 - 450 10e9/L    Diff Method Automated Method     % Neutrophils 39.6 %    % Lymphocytes 37.7 %    % Monocytes 17.2 %    % Eosinophils 4.1 %    % Basophils 1.4 %    % Immature Granulocytes 0.0 %    Nucleated RBCs 0 0 /100    Absolute Neutrophil 1.7 1.6 - 8.3 10e9/L    Absolute Lymphocytes 1.6 0.8 - 5.3 10e9/L    Absolute Monocytes 0.8 0.0 - 1.3 10e9/L    Absolute Eosinophils 0.2 0.0 - 0.7 10e9/L    Absolute Basophils 0.1 0.0 - 0.2 10e9/L    Abs Immature Granulocytes 0.0 0 - 0.4 10e9/L    Absolute Nucleated RBC 0.0    Lipid Profile (Chol, Trig, HDL, LDL calc)   Result Value Ref Range    Cholesterol 189 <200 mg/dL    Triglycerides 131 <150 mg/dL    HDL Cholesterol 54 >39 mg/dL    LDL Cholesterol Calculated 109 (H) <100 mg/dL    Non HDL Cholesterol 135 (H) <130 mg/dL         ASSESSMENT/PLAN:   1. Routine history and physical examination of adult  zoranl doing well. Retired now. See in a year   - Lipid Profile (Chol, Trig, HDL, LDL calc); Future  - CBC with platelets and differential; Future  - Comprehensive metabolic panel; Future  - PSA Diagnostic; Future  - PSA Diagnostic  - Comprehensive metabolic panel  - CBC with platelets and differential  - Lipid Profile (Chol, Trig, HDL, LDL calc)    2. Hypertrophy of prostate without urinary obstruction  Mild and stable  -= better with avodart  - PSA Diagnostic; Future  - PSA Diagnostic  - OFFICE/OUTPT VISIT,EST,LEVL III    3. Mixed hyperlipidemia  Discussed - see below  - Lipid Profile (Chol, Trig, HDL, LDL calc); Future  - CBC with platelets and differential; Future  - Comprehensive metabolic panel; Future  - Comprehensive metabolic panel  - CBC with platelets and differential  - Lipid Profile (Chol, Trig, HDL, LDL calc)  - fenofibrate (TRICOR) 145 MG tablet; TAKE 1 TABLET DAILY BY MOUTH  Dispense: 90 tablet; Refill: 3  - simvastatin (ZOCOR) 20 MG tablet; Take 1 tablet (20 mg) by mouth At Bedtime  Dispense: 60 tablet; Refill: 3  - Hepatic panel; Future  - Lipid Profile; Future  - OFFICE/OUTPT VISIT,EST,LEVL III    4. Nocturia  Stable PSA- dropping with Avodart   - PSA Diagnostic; Future  - PSA Diagnostic  - OFFICE/OUTPT VISIT,EST,LEVL III    5. Tobacco abuse, in remission  CXR ok  - XR Chest 2 Views; Future  - XR Chest 2 Views  - OFFICE/OUTPT VISIT,EST,LEVL III    6. Need for prophylactic vaccination and inoculation against influenza  Shot given  - Vaccine Administration, Initial [88091]  - C RIV4 (FLUBLOK) VACCINE RECOMBINANT DNA PRSRV ANTIBIO FREE, IM    7. Benign non-nodular prostatic hyperplasia  As above  - dutasteride (AVODART) 0.5 MG capsule; TAKE 1 CAPSULE BY MOUTH ONCE DAILY - GENERIC FOR AVODART  Dispense: 90 capsule; Refill: 3    8. DDD (degenerative disc disease), cervical  With on and off cervical radiculopathy - discussed PT- he deferred. Discussed in length conservative measures of OTC medications for pain, Ice/Heat, elevation and the concept of R.I.C.E.. Continue behavioral changes and proper body mechanics to allow for healing. Follow up as directed.   Discussed behavioral changes and proper body mechanics needed to help control patient's back pain.   - OFFICE/OUTPT VISIT,EST,LEVL III    9. FH: heart disease  Long discussion - big concern for him. Handouts given. R/B of low dose statin along with tricor discussed  "INcluding risk of rhabdo.  Will try - labs in 6-8 weeks set up.    - simvastatin (ZOCOR) 20 MG tablet; Take 1 tablet (20 mg) by mouth At Bedtime  Dispense: 60 tablet; Refill: 3  - OFFICE/OUTPT VISIT,WILLIAN GARCIA III    COUNSELING:  Reviewed preventive health counseling, as reflected in patient instructions       Regular exercise       Healthy diet/nutrition    BP Readings from Last 1 Encounters:   03/20/18 128/76     Estimated body mass index is 29.8 kg/m  as calculated from the following:    Height as of 3/20/18: 1.867 m (6' 1.5\").    Weight as of 3/20/18: 103.9 kg (229 lb).           reports that he has quit smoking. His smoking use included cigarettes. He has a 30.00 pack-year smoking history. he has never used smokeless tobacco.      Counseling Resources:  ATP IV Guidelines  Pooled Cohorts Equation Calculator  FRAX Risk Assessment  ICSI Preventive Guidelines  Dietary Guidelines for Americans, 2010  [employer]'s MyPlate  ASA Prophylaxis  Lung CA Screening    Ayo Sanchez MD  [facility] - [place]INGInjectable Influenza Immunization Documentation    1.  Is the person to be vaccinated sick today?   No    2. Does the person to be vaccinated have an allergy to a component   of the vaccine?   No  Egg Allergy Algorithm Link    3. Has the person to be vaccinated ever had a serious reaction   to influenza vaccine in the past?   No    4. Has the person to be vaccinated ever had Guillain-Barré syndrome?   No    Form completed by Price Obando  Prior to injection, verified patient identity using patient's name and date of birth.           "

## 2019-01-02 NOTE — PATIENT INSTRUCTIONS
Preventive Health Recommendations  Male Ages 50 - 64    Yearly exam:             See your health care provider every year in order to  o   Review health changes.   o   Discuss preventive care.    o   Review your medicines if your doctor has prescribed any.     Have a cholesterol test every 5 years, or more frequently if you are at risk for high cholesterol/heart disease.     Have a diabetes test (fasting glucose) every three years. If you are at risk for diabetes, you should have this test more often.     Have a colonoscopy at age 50, or have a yearly FIT test (stool test). These exams will check for colon cancer.      Talk with your health care provider about whether or not a prostate cancer screening test (PSA) is right for you.    You should be tested each year for STDs (sexually transmitted diseases), if you re at risk.     Shots: Get a flu shot each year. Get a tetanus shot every 10 years.     Nutrition:    Eat at least 5 servings of fruits and vegetables daily.     Eat whole-grain bread, whole-wheat pasta and brown rice instead of white grains and rice.     Get adequate Calcium and Vitamin D.     Lifestyle    Exercise for at least 150 minutes a week (30 minutes a day, 5 days a week). This will help you control your weight and prevent disease.     Limit alcohol to one drink per day.     No smoking.     Wear sunscreen to prevent skin cancer.     See your dentist every six months for an exam and cleaning.     See your eye doctor every 1 to 2 years.    Chest 2 Views   Order: [id number]   Status:  Final result   Visible to patient:  No (Not Released) Dx:  Tobacco abuse, in remission   Details     Reading Physician Reading Date Result Priority   Bradley Redding MD 1/9/2019       Narrative     PROCEDURE:  XR CHEST 2 VW    HISTORY:  Tobacco abuse, in remission.     COMPARISON:  None.    FINDINGS:   The cardiac silhouette is normal in size. The pulmonary vasculature is  normal.  The lungs are clear. No pleural  effusion or pneumothorax.      Impression     IMPRESSION:  No acute cardiopulmonary disease.      ASHLEY HELM MD             Last Resulted: 01/09/19  8:22 AM        Order Details       View Encounter       Lab and Collection Details       Routing       Result History               Other Results from 1/9/2019     PSA Diagnostic   Order: [id number]     Status:  Final result   Visible to patient:  No (Not Released) Dx:  Routine history and physical examinat...    Ref Range & Units 7:49 AM 9mo ago 1yr ago   PSA 0 - 4 ug/L 0.47  0.50 CM 1.24 CM   Comment: Assay Method:  Chemiluminescence using Siemens Vista analyzer   Resulting Agency  [facility]         Specimen Collected: 01/09/19  7:49 AM Last Resulted: 01/09/19  8:16 AM         Lab Flowsheet       Order Details       View Encounter       Lab and Collection Details       Routing       Result History         CM=Additional comments              Contains abnormal data Comprehensive metabolic panel   Order: [id number]     Status:  Final result   Visible to patient:  No (Not Released) Dx:  Routine history and physical examinat...    Ref Range & Units 7:49 AM 9mo ago 1yr ago   Sodium 133 - 144 mmol/L 140  138  141    Potassium 3.4 - 5.3 mmol/L 3.7  4.0  3.9    Chloride 94 - 109 mmol/L 105  103  105    Carbon Dioxide 20 - 32 mmol/L 29  30  27    Anion Gap 3 - 14 mmol/L 6  5  9    Glucose 70 - 99 mg/dL 95  97  96    Comment: Fasting specimen   Urea Nitrogen 7 - 30 mg/dL 20  20  19    Creatinine 0.66 - 1.25 mg/dL 1.11  1.06  1.11    GFR Estimate >60 mL/min/ 73  72 R, CM 69 R, CM   Comment: Non  GFR Calc   Starting 12/18/2018, serum creatinine based estimated GFR (eGFR) will be   calculated using the Chronic Kidney Disease Epidemiology Collaboration   (CKD-EPI) equation.    GFR Estimate If Black >60 mL/min/ 84  87 R, CM 83 R, CM   Comment:  GFR Calc   Starting 12/18/2018, serum creatinine based estimated GFR (eGFR)  will be   calculated using the Chronic Kidney Disease Epidemiology Collaboration   (CKD-EPI) equation.    Calcium 8.5 - 10.1 mg/dL 8.9  9.3  8.8    Bilirubin Total 0.2 - 1.3 mg/dL 0.5  0.4  0.5    Albumin 3.4 - 5.0 g/dL 4.2  4.3  4.2    Protein Total 6.8 - 8.8 g/dL 7.4  8.0  7.6    Alkaline Phosphatase 40 - 150 U/L 24 Abnormally low   30 Abnormally low   30 Abnormally low     ALT 0 - 70 U/L 36  34  35    AST 0 - 45 U/L 17  18  18    Resulting Agency  [facility]         Specimen Collected: 01/09/19  7:49 AM Last Resulted: 01/09/19  8:16 AM         Lab Flowsheet       Order Details       View Encounter       Lab and Collection Details       Routing       Result History         CM=Additional comments  R=Reference range differs from displayed range              Contains abnormal data CBC with platelets and differential   Order: [id number]     Status:  Final result   Visible to patient:  No (Not Released) Dx:  Routine history and physical examinat...    Ref Range & Units 7:49 AM 9mo ago   WBC 4.0 - 11.0 10e9/L 4.4  4.9    RBC Count 4.4 - 5.9 10e12/L 4.38 Abnormally low   4.61    Hemoglobin 13.3 - 17.7 g/dL 13.8  14.6    Hematocrit 40.0 - 53.0 % 39.9 Abnormally low   42.3    MCV 78 - 100 fl 91  92    MCH 26.5 - 33.0 pg 31.5  31.7    MCHC 31.5 - 36.5 g/dL 34.6  34.5    RDW 10.0 - 15.0 % 12.1  12.4    Platelet Count 150 - 450 10e9/L 283  272    Diff Method  Automated Method  Automated Method    % Neutrophils % 39.6  50.1    % Lymphocytes % 37.7  29.8    % Monocytes % 17.2  16.0    % Eosinophils % 4.1  2.7    % Basophils % 1.4  1.0    % Immature Granulocytes % 0.0  0.4    Nucleated RBCs 0 /100 0  0    Absolute Neutrophil 1.6 - 8.3 10e9/L 1.7  2.4    Absolute Lymphocytes 0.8 - 5.3 10e9/L 1.6  1.5    Absolute Monocytes 0.0 - 1.3 10e9/L 0.8  0.8    Absolute Eosinophils 0.0 - 0.7 10e9/L 0.2  0.1    Absolute Basophils 0.0 - 0.2 10e9/L 0.1  0.1    Abs Immature Granulocytes 0 - 0.4 10e9/L 0.0  0.0     Absolute Nucleated RBC  0.0  0.0    [facility]ing Agency  [facility]         Specimen Collected: 01/09/19  7:49 AM Last Resulted: 01/09/19  7:59 AM         Lab Flowsheet       Order Details       View Encounter       Lab and Collection Details       Routing       Result History                  Contains abnormal data Lipid Profile (Chol, Trig, HDL, LDL calc)   Order: [id number]     Status:  Final result   Visible to patient:  No (Not Released) Dx:  Routine history and physical examinat...    Ref Range & Units 7:49 AM 9mo ago 1yr ago   Cholesterol <200 mg/dL 189  217 Abnormally high      Triglycerides <150 mg/dL 131  254 Abnormally high      Comment: Fasting specimen   HDL Cholesterol >39 mg/dL 54  55  59    LDL Cholesterol Calculated <100 mg/dL 109 Abnormally high   111 Abnormally high   Abnormally high  CM   Comment: Above desirable:  100-129 mg/dl   Borderline High:  130-159 mg/dL   High:             160-189 mg/dL   Very high:       >189 mg/dl    Non HDL Cholesterol <130 mg/dL 135 Abnormally high   162 Abnormally high   Abnormally high  CM   Comment: Above Desirable:  130-159 mg/dl   Borderline high:  160-189 mg/dl   High:             190-219 mg/dl   Very high:       >219 mg/dl    Resulting Agency  [facility]         Specimen Collected: 01/09/19  7:49 AM Last Resulted: 01/09/19  8:16 AM         Lab Flowsheet       Order Details       View Encounter       Lab and Collection Details       Routing       Result History         CM=Additional comments           Status of Other Orders     Completed     C RIV4 (FLUBLOK) VACCINE RECOMBINANT DNA PRSRV ANTIBIO FREE, IM  01/09/19   Vaccine Administration, Initial [[id number]]  01/09/19   Lipid Profile (Chol, Trig, HDL, LDL calc) Abnormal 01/09/19   CBC with platelets and differential Abnormal 01/09/19   Comprehensive metabolic panel Abnormal 01/09/19   PSA Diagnostic  01/09/19      Canceled    HC FLU VAC PRESRV FREE QUAD SPLIT VIR 3+YRS IM  01/09/19   Reason: Error         Patient Education     What Is Angina?  Angina is a warning that your heart muscle is not getting enough oxygen-rich blood and is at risk for damage. Medicines, certain medical procedures, and lifestyle changes can help control angina. Talk with your healthcare provider about how to prevent angina and what to do if you get it.    How does angina feel?  Angina is often described as chest pain, but this can be misleading. Angina is not always painful, and it isn t always felt in the chest. Angina might feel like:    Discomfort, aching, tightness, or pressure that comes and goes. You may feel this in your chest, back, abdomen, arm, shoulder, neck, or jaw.    Tiredness that gets worse or you have more tiredness than usual for no clear reason    Shortness of breath while doing something that used to be easy    Heartburn, indigestion, nausea, or sweating  Call 911 right away if any of your symptoms lasts for more than a few minutes. Or if they go away and come back. Or if they happen at rest and don't go away after taking nitroglycerin. Or if they continue to get worse. You could be having a heart attack (acute myocardial infarction).  When does angina happen?    Angina usually happens during activity. It can also occur when you re upset or after a large meal. Sometimes angina can happen when the weather is too hot or too cold. All of these things can put more stress on your body and your heart.    You may have unstable angina if angina starts occurring more often, lasts longer, happens even when you are resting, or causes more discomfort. It s a sign that your heart problem may be getting worse. You need to call your healthcare provider right away.  Date Last Reviewed: 10/1/2016    2064-0655 [employer]. [address] [place] [address], [place] [address]. All rights reserved. This information is not intended as a substitute for professional medical care. Always follow your healthcare  professional's instructions.           Patient Education     Understanding Your Cholesterol Numbers  The higher your blood cholesterol, the greater your risk for heart attack (acute myocardial infarction), cardiovascular disease, or stroke. High cholesterol can cause any artery in your body to become narrow. That s why you need to know your cholesterol level. If it s high, you can take steps to bring it down. Eating the right foods and getting enough exercise can help. Some people also need medicine to control their cholesterol. Your healthcare provider can help you get started on a plan to control your cholesterol.        Blood flows easily when arteries are clear.      Less blood flows when cholesterol builds up in artery walls.   Checking your cholesterol  Your cholesterol is checked with a simple blood test. The results tell you how much cholesterol you have in your blood. Get checked as often as your healthcare provider suggests. If you have diabetes or high cholesterol, you may need your blood tested as often as every 3 months. As you work to lower your cholesterol, your numbers will change slowly. But they will change. Be patient and stay on track. Discuss your numbers with your healthcare provider.   Your total cholesterol number  A blood test will give you a number for the total amount of cholesterol in your blood. The higher this number, the more likely it is that cholesterol will build up in your blood vessels. Even if your cholesterol is just slightly high, you are at increased risk for health problems.  My total cholesterol is: ________________  Your lipid numbers  Total cholesterol is just one part of the story. Cholesterol is made up of different kinds of fats (lipids). If your total cholesterol is high, knowing your lipid profile is important. The 2 most important lipids are HDL and LDL. Lipids are checked after you have fasted. This means you don t eat for a certain amount of time before the test is  done. Along with cholesterol, triglyceride can also lead to blocked arteries. Triglycerides are another type of fat. Knowing your HDL, LDL, and triglyceride numbers, as well as your total cholesterol gives you a more complete picture of your cholesterol level:    HDL is called the  good  cholesterol. It moves out of the bloodstream and does not block your blood vessels. HDL levels are affected by how much you exercise and what you eat.My HDL cholesterol is: ________________    LDL is called the  bad  cholesterol. This is because it can stick to your artery walls and block blood flow. LDL levels are most affected by what you eat and by your genes.My LDL cholesterol is: ________________    Triglyceride is a type of fat the body uses to store energy. Too much triglyceride can increase your risk for heart disease. Triglyceride levels should be under 150.My triglyceride is:  ________________  Based on your other health issues and risk factors, your healthcare provider may have specific goals for treating your cholesterol. You may need to use different kinds of medicines that work on the different types of cholesterol. Work with your provider to know what your goals of treatment are. Stick with your treatment plan to reach the goals you set.  High-risk groups  Some people may need to take medicines called statins to control their cholesterol. This is in addition to eating a healthy diet and getting regular exercise.  Statins can help you stay healthy. They can also help prevent a heart attack or stroke. You may need to take a statin if you are in one of these groups:    Adults who have had a heart attack or stroke. Or adults who have had peripheral vascular disease, a ministroke (transient ischemic attack), or stable or unstable angina. This group also includes people who have had a procedure to restore blood flow through a blocked artery. These procedures include percutaneous coronary intervention, angioplasty, stent,  and open-heart bypass surgery.    Adults who have diabetes. Or adults who are at higher risk of having a heart attack or stroke and have an LDL cholesterol level of 70 to 189 mg/dL    Adults who are 21 years old or older and have an LDL cholesterol level of 190 mg/dL or higher.  If you are in a high-risk group, talk with your healthcare provider about your treatment goals. Make sure you understand why these goals are important, based on your own health history and your family history of heart disease or high cholesterol.  Make a plan to have regular cholesterol checks. You may need to fast before getting this test. Also ask your provider about any side effects your medicines may cause. Let your provider know about any side effects you have. You may need to take more than one medicine to reach the cholesterol goals that you and your provider decide on.  Date Last Reviewed: 10/1/2016    2071-5670 [employer]. [address] [place], [place] [id number]. All rights reserved. This information is not intended as a substitute for professional medical care. Always follow your healthcare professional's instructions.           Patient Education     Recognizing a Heart Attack or Angina    If you have risk factors for heart problems, you should always watch for signs of angina or a heart attack. If you have a sudden heart problem, getting treatment right away could save your life. Risk factors for a heart attack include advanced age, high cholesterol, high blood pressure, having a family member who had a heart attack before the age of 50, diabetes, smoking, and stress. There are other risk factors, including eating a high-fat diet and not getting much exercise.  Understanding angina and heart attack    Angina is a painful burning, tightness, or pressure in the chest, back, neck, throat, or jaw. It means not enough blood is getting to the heart. This is usually from a blocked artery in the heart. Angina is a  sign that you may be having, or are about to have, a heart attack. You need to see a healthcare provider right away.    A heart attack is also known as acute myocardial infarction (AMI). It is what happens when blood can't get to part of the heart muscle. That part of the heart muscle is damaged and starts to die. If enough of the heart is affected, it will severely limit its ability to send blood to the rest of the body. It may cause death. It is vital to get help as soon as possible for a heart attack.  Stable angina versus unstable angina  Stable angina is also known as chronic angina. It has a typical pattern. It happens when you exert yourself physically or feel a strong emotion. Nitroglycerin, rest, or both, will easily relieve stable angina symptoms. Stable angina symptoms will most likely feel the same each time you have them. It is important to discuss these symptoms with your healthcare provider. They can be a warning sign of a future heart attack.  Unstable angina causes unexpected or unpredictable symptoms, commonly when you are at rest. Unstable angina is a medical emergency. Angina is also considered unstable if resting and nitroglycerin don't ease symptoms. It is also unstable if symptoms are getting worse, happening more often, or lasting longer. These symptoms may mean you have a severe blockage or a spasm of a heart artery. Unstable angina is commonly a sign of an active heart attack. Remember the following tips:    Stable angina symptoms should go away with rest or medicine. If they don t go away, call 911!    Stable angina symptoms last for only a few minutes. If they last longer than that, or if they go away and come back, you may be having a heart attack. Call 911!    If you have shortness of breath, cold sweat, nausea, or lightheadedness, call 911!  For angina that shows up for the first time, there is only one response: ?Call 911! You should never diagnose angina by yourself. If these  symptoms are new, or worse than usual, call 911!  Warning signs of a heart attack  If you have symptoms that you can t explain, call 911right away. Don't drive yourself to the emergency room. The following are warning signs of a possible heart attack:    Chest discomfort. Most heart attacks involve discomfort in the center of the chest that lasts more than a few minutes, or that goes away and comes back. It can feel like uncomfortable pressure, squeezing, fullness, or pain.    Discomfort in other areas of the upper body. Symptoms can include pain or discomfort in one or both arms, the back, neck, jaw, or stomach.    Shortness of breath with or without chest discomfort    Other signs may include breaking out in a cold sweat, nausea, or lightheadedness.  If you think someone is having a heart attack, call 911 instead of driving the person to the ER. The  may tell you to give the person aspirin while waiting for help to arrive. If the dispatcher doesn t tell you to do this, don t give the person aspirin. Aspirin can be dangerous under certain circumstances.    Note for women. Like men, women commonly have chest pain or discomfort as a heart attack symptom. But women are somewhat more likely than men to have other common symptoms, such as shortness of breath, nausea and vomiting, back pain, or jaw pain.    Note for older adults. Older people may also not have typical symptoms of a heart attack. They may have symptoms that include fainting, weakness, or confusion. Ignoring these symptoms can lead to critical illness or death. You should get your symptoms checked out right away.    If you've had a heart attack. People who have had one heart attack are at risk for having another. Your provider may prescribe medicine such as nitroglycerin to take when chest pain starts. Or you may need medicines to lower your heart rate and blood pressure to prevent angina and another heart attack. Remember to take any  medicines your provider has given. Don't not stop them without speaking with your provider first.     If you have diabetes: silent heart problems  Over time, high blood sugar can damage nerves in your body. This may keep you from feeling pain caused by a heart problem, leading to a  silent  heart problem. If you don t feel symptoms, you are less able to recognize that you may be having a heart attack and get treatment right away. Talk to your healthcare provider about how to lower your risk for silent heart problems.   Date Last Reviewed: 6/1/2016 2000-2018 [employer] [address] [place] [address]. All rights reserved. This information is not intended as a substitute for professional medical care. Always follow your healthcare professional's instructions.           Patient Education     Protecting Your Neck: Posture and Body Mechanics     Using good posture while lifting can help prevent pain or injury.   Protecting your neck from injuries and pain involves practicing good posture and body mechanics. This may mean correcting bad habits you have related to the way you hold and move your body. The tips below can help you improve your posture and body mechanics.  What is posture and why does it matter?  Posture is the way you hold your body. For many of us, this means hunching over, thrusting the chin forward, and slouching the shoulders. But this kind of poor posture keeps muscles from properly supporting the neck and puts stress on muscles, disks, ligaments, and joints in your neck. As a result, injury and pain can happen.  How is your posture?  Use a full-length mirror to check your posture. To begin, stand normally. Then slowly back up against a wall. Is there space between your head and the wall? Do you slouch your shoulders? Is your chin pointing up or down? All these can lead to neck tension and pain.  Improving your posture  Follow these steps to improve your posture:    Pull your  shoulders back.    Think of the ears, shoulders, and hips as a series of dots. Now, adjust your body to connect the dots in a straight line.    Keep your chin level.  What are body mechanics and why do they matter?  The way you move and position your body during daily activities is called body mechanics. Good body mechanics help protect the neck. This means learning the right ways to stand, sit, and even sleep. So, do what s best for your neck and practice good body mechanics.  Standing   To protect your neck while standing:    Carry objects close to your body.    Keep your ears and shoulders in a line while standing or walking.    To lower yourself, bend at the knees with a straight back. Do this instead of looking down and reaching for objects.    Work at eye level. Don t reach above your head or tilt your head back.  Sitting   To protect your neck while sitting:    Set up your workstation so your monitor is at eye level. Also, use a document salazar when viewing papers or books.    Keep your knees at or slightly below the level of your hips.    Sit up straight, with feet flat on the floor. If your feet don t touch the floor, use a footrest.    Avoid sitting or driving for long periods. Take breaks often.  Sleeping   To protect your neck while sleeping:    Sleep on your back with a pillow under your knees or on your side with a pillow between bent knees. This helps align the spine.    Avoid using pillows that are too high or too low. Instead, use a neck roll or pillow under your neck while you sleep to keep the neck straight.    Sleep on a mattress that supports you.  Date Last Reviewed: 10/1/2017    9790-7844 [employer]. [address] [place] [address], [place] [address]. All rights reserved. This information is not intended as a substitute for professional medical care. Always follow your healthcare professional's instructions.           Patient Education     Neck Problems: Relieving Your Symptoms    The  first goal of treatment is to relieve your symptoms. Your healthcare provider may recommend self-care treatments. These include resting, applying ice and heat, taking medicine, and doing exercises. Your healthcare provider may also recommend that you see a physical therapist who can teach you ways to care for and strengthen your neck.  Self-care treatments  Pain can end quickly or last a while. Either way, you ll want relief as soon as possible. Your healthcare provider can tell you which treatments to do at home to help relieve your pain:    Lying down for a short time takes pressure from the head off the neck.    Ice and heat can help reduce pain. To bring down swelling, rest an ice pack wrapped in a thin towel on your neck for 10 to 15 minutes. To relax sore muscles, apply a warm, wet towel to the area. Or you can take a warm bath or shower.    Over-the-counter medicines, such as ibuprofen, naproxen, and aspirin, can help reduce pain and swelling. Acetaminophen can help relieve pain. Use these only as directed.    Exercises can relax muscles and ease stiffness. To prepare, drape a warm, wet towel around your neck and shoulders for 5 minutes. Remove the towel. Then do any exercises recommended to you by your healthcare provider.  Physical therapy  If self-care treatments aren t helping relieve neck pain, your healthcare provider may suggest physical therapy. Physical therapy is done by a specialist trained to treat injuries and musculoskeletal disorders. Your physical therapist (PT) will teach you how to strengthen muscles, improve the spine s alignment, and help you move properly. Treatment methods used in physical therapy may include:    Heat. A special heating pad called a neck pack may be applied to your neck.    Exercises. Your PT will teach you exercises to help strengthen your neck and improve its range of motion.    Joint mobilization. The PT gently moves your vertebrae to help restore motion in your neck  joints and reduce neck pain.    Soft tissue mobilization. The PT massages and stretches the muscles in your neck and shoulders.    Electrical stimulation. Electrical impulses are sent into your neck. This helps reduce soreness and inflammation.    Education in body mechanics. The PT shows you ways to position and move your body that protect the neck.  Other treatments  If physical therapy doesn t relieve your neck pain, your healthcare provider may suggest other treatments. For example, medicines or injections can help relieve pain and swelling. In some cases, surgery may be needed to treat neck problems.  Date Last Reviewed: 10/1/2017    [id number] [employer]. [address] [place] [address], [place] [address]. All rights reserved. This information is not intended as a substitute for professional medical care. Always follow your healthcare professional's instructions.

## 2019-01-09 ENCOUNTER — ANCILLARY PROCEDURE (OUTPATIENT)
Dept: GENERAL RADIOLOGY | Facility: OTHER | Age: 59
End: 2019-01-09
Attending: FAMILY MEDICINE
Payer: COMMERCIAL

## 2019-01-09 ENCOUNTER — OFFICE VISIT (OUTPATIENT)
Dept: FAMILY MEDICINE | Facility: OTHER | Age: 59
End: 2019-01-09
Attending: FAMILY MEDICINE
Payer: COMMERCIAL

## 2019-01-09 VITALS
WEIGHT: 220 LBS | BODY MASS INDEX: 28.23 KG/M2 | SYSTOLIC BLOOD PRESSURE: 124 MMHG | DIASTOLIC BLOOD PRESSURE: 70 MMHG | HEIGHT: 74 IN | OXYGEN SATURATION: 98 % | HEART RATE: 71 BPM | TEMPERATURE: 97.4 F

## 2019-01-09 DIAGNOSIS — N40.0 BENIGN NON-NODULAR PROSTATIC HYPERPLASIA: ICD-10-CM

## 2019-01-09 DIAGNOSIS — F17.201 TOBACCO ABUSE, IN REMISSION: ICD-10-CM

## 2019-01-09 DIAGNOSIS — R35.1 NOCTURIA: ICD-10-CM

## 2019-01-09 DIAGNOSIS — Z82.49 FH: HEART DISEASE: ICD-10-CM

## 2019-01-09 DIAGNOSIS — N40.0 HYPERTROPHY OF PROSTATE WITHOUT URINARY OBSTRUCTION: ICD-10-CM

## 2019-01-09 DIAGNOSIS — Z00.00 ROUTINE HISTORY AND PHYSICAL EXAMINATION OF ADULT: Primary | ICD-10-CM

## 2019-01-09 DIAGNOSIS — E78.2 MIXED HYPERLIPIDEMIA: ICD-10-CM

## 2019-01-09 DIAGNOSIS — M50.30 DDD (DEGENERATIVE DISC DISEASE), CERVICAL: ICD-10-CM

## 2019-01-09 DIAGNOSIS — Z23 NEED FOR PROPHYLACTIC VACCINATION AND INOCULATION AGAINST INFLUENZA: ICD-10-CM

## 2019-01-09 LAB
ALBUMIN SERPL-MCNC: 4.2 G/DL (ref 3.4–5)
ALP SERPL-CCNC: 24 U/L (ref 40–150)
ALT SERPL W P-5'-P-CCNC: 36 U/L (ref 0–70)
ANION GAP SERPL CALCULATED.3IONS-SCNC: 6 MMOL/L (ref 3–14)
AST SERPL W P-5'-P-CCNC: 17 U/L (ref 0–45)
BASOPHILS # BLD AUTO: 0.1 10E9/L (ref 0–0.2)
BASOPHILS NFR BLD AUTO: 1.4 %
BILIRUB SERPL-MCNC: 0.5 MG/DL (ref 0.2–1.3)
BUN SERPL-MCNC: 20 MG/DL (ref 7–30)
CALCIUM SERPL-MCNC: 8.9 MG/DL (ref 8.5–10.1)
CHLORIDE SERPL-SCNC: 105 MMOL/L (ref 94–109)
CHOLEST SERPL-MCNC: 189 MG/DL
CO2 SERPL-SCNC: 29 MMOL/L (ref 20–32)
CREAT SERPL-MCNC: 1.11 MG/DL (ref 0.66–1.25)
DIFFERENTIAL METHOD BLD: ABNORMAL
EOSINOPHIL # BLD AUTO: 0.2 10E9/L (ref 0–0.7)
EOSINOPHIL NFR BLD AUTO: 4.1 %
ERYTHROCYTE [DISTWIDTH] IN BLOOD BY AUTOMATED COUNT: 12.1 % (ref 10–15)
GFR SERPL CREATININE-BSD FRML MDRD: 73 ML/MIN/{1.73_M2}
GLUCOSE SERPL-MCNC: 95 MG/DL (ref 70–99)
HCT VFR BLD AUTO: 39.9 % (ref 40–53)
HDLC SERPL-MCNC: 54 MG/DL
HGB BLD-MCNC: 13.8 G/DL (ref 13.3–17.7)
IMM GRANULOCYTES # BLD: 0 10E9/L (ref 0–0.4)
IMM GRANULOCYTES NFR BLD: 0 %
LDLC SERPL CALC-MCNC: 109 MG/DL
LYMPHOCYTES # BLD AUTO: 1.6 10E9/L (ref 0.8–5.3)
LYMPHOCYTES NFR BLD AUTO: 37.7 %
MCH RBC QN AUTO: 31.5 PG (ref 26.5–33)
MCHC RBC AUTO-ENTMCNC: 34.6 G/DL (ref 31.5–36.5)
MCV RBC AUTO: 91 FL (ref 78–100)
MONOCYTES # BLD AUTO: 0.8 10E9/L (ref 0–1.3)
MONOCYTES NFR BLD AUTO: 17.2 %
NEUTROPHILS # BLD AUTO: 1.7 10E9/L (ref 1.6–8.3)
NEUTROPHILS NFR BLD AUTO: 39.6 %
NONHDLC SERPL-MCNC: 135 MG/DL
NRBC # BLD AUTO: 0 10*3/UL
NRBC BLD AUTO-RTO: 0 /100
PLATELET # BLD AUTO: 283 10E9/L (ref 150–450)
POTASSIUM SERPL-SCNC: 3.7 MMOL/L (ref 3.4–5.3)
PROT SERPL-MCNC: 7.4 G/DL (ref 6.8–8.8)
PSA SERPL-MCNC: 0.47 UG/L (ref 0–4)
RBC # BLD AUTO: 4.38 10E12/L (ref 4.4–5.9)
SODIUM SERPL-SCNC: 140 MMOL/L (ref 133–144)
TRIGL SERPL-MCNC: 131 MG/DL
WBC # BLD AUTO: 4.4 10E9/L (ref 4–11)

## 2019-01-09 PROCEDURE — 80061 LIPID PANEL: CPT | Performed by: FAMILY MEDICINE

## 2019-01-09 PROCEDURE — 71046 X-RAY EXAM CHEST 2 VIEWS: CPT | Mod: TC

## 2019-01-09 PROCEDURE — 80053 COMPREHEN METABOLIC PANEL: CPT | Performed by: FAMILY MEDICINE

## 2019-01-09 PROCEDURE — 85025 COMPLETE CBC W/AUTO DIFF WBC: CPT | Performed by: FAMILY MEDICINE

## 2019-01-09 PROCEDURE — 90682 RIV4 VACC RECOMBINANT DNA IM: CPT | Performed by: FAMILY MEDICINE

## 2019-01-09 PROCEDURE — 90471 IMMUNIZATION ADMIN: CPT | Performed by: FAMILY MEDICINE

## 2019-01-09 PROCEDURE — 99396 PREV VISIT EST AGE 40-64: CPT | Mod: 25 | Performed by: FAMILY MEDICINE

## 2019-01-09 PROCEDURE — 36415 COLL VENOUS BLD VENIPUNCTURE: CPT | Performed by: FAMILY MEDICINE

## 2019-01-09 PROCEDURE — 84153 ASSAY OF PSA TOTAL: CPT | Performed by: FAMILY MEDICINE

## 2019-01-09 RX ORDER — DUTASTERIDE 0.5 MG/1
CAPSULE, LIQUID FILLED ORAL
Qty: 90 CAPSULE | Refills: 3 | Status: SHIPPED | OUTPATIENT
Start: 2019-01-09 | End: 2020-01-06

## 2019-01-09 RX ORDER — SIMVASTATIN 20 MG
20 TABLET ORAL AT BEDTIME
Qty: 60 TABLET | Refills: 3 | Status: SHIPPED | OUTPATIENT
Start: 2019-01-09 | End: 2019-03-27

## 2019-01-09 RX ORDER — FENOFIBRATE 145 MG/1
TABLET, COATED ORAL
Qty: 90 TABLET | Refills: 3 | Status: SHIPPED | OUTPATIENT
Start: 2019-01-09 | End: 2020-01-06

## 2019-01-09 ASSESSMENT — ANXIETY QUESTIONNAIRES
7. FEELING AFRAID AS IF SOMETHING AWFUL MIGHT HAPPEN: NOT AT ALL
4. TROUBLE RELAXING: NOT AT ALL
1. FEELING NERVOUS, ANXIOUS, OR ON EDGE: NOT AT ALL
6. BECOMING EASILY ANNOYED OR IRRITABLE: NOT AT ALL
2. NOT BEING ABLE TO STOP OR CONTROL WORRYING: NOT AT ALL
GAD7 TOTAL SCORE: 0
3. WORRYING TOO MUCH ABOUT DIFFERENT THINGS: NOT AT ALL
5. BEING SO RESTLESS THAT IT IS HARD TO SIT STILL: NOT AT ALL

## 2019-01-09 ASSESSMENT — PAIN SCALES - GENERAL: PAINLEVEL: NO PAIN (0)

## 2019-01-09 ASSESSMENT — PATIENT HEALTH QUESTIONNAIRE - PHQ9: SUM OF ALL RESPONSES TO PHQ QUESTIONS 1-9: 0

## 2019-01-09 ASSESSMENT — MIFFLIN-ST. JEOR: SCORE: 1892.66

## 2019-01-09 NOTE — NURSING NOTE
"Chief Complaint   Patient presents with     Physical       Initial /70   Pulse 71   Temp 97.4  F (36.3  C)   Ht 1.88 m (6' 2\")   Wt 99.8 kg (220 lb)   SpO2 98%   BMI 28.25 kg/m   Estimated body mass index is 28.25 kg/m  as calculated from the following:    Height as of this encounter: 1.88 m (6' 2\").    Weight as of this encounter: 99.8 kg (220 lb).  Medication Reconciliation: complete    Price Obando LPN  "

## 2019-01-10 ASSESSMENT — ANXIETY QUESTIONNAIRES: GAD7 TOTAL SCORE: 0

## 2019-01-21 ENCOUNTER — TRANSFERRED RECORDS (OUTPATIENT)
Dept: HEALTH INFORMATION MANAGEMENT | Facility: HOSPITAL | Age: 59
End: 2019-01-21

## 2019-01-29 NOTE — PATIENT INSTRUCTIONS
Before Your Surgery      Call your surgeon if there is any change in your health. This includes signs of a cold or flu (such as a sore throat, runny nose, cough, rash or fever).    Do not smoke, drink alcohol or take over the counter medicine (unless your surgeon or primary care doctor tells you to) for the 24 hours before and after surgery.    If you take prescribed drugs: Follow your doctor s orders about which medicines to take and which to stop until after surgery.    Eating and drinking prior to surgery: follow the instructions from your surgeon    Take a shower or bath the night before surgery. Use the soap your surgeon gave you to gently clean your skin. If you do not have soap from your surgeon, use your regular soap. Do not shave or scrub the surgery site.  Wear clean pajamas and have clean sheets on your bed.     Results for orders placed or performed in visit on 02/05/19   Basic metabolic panel   Result Value Ref Range    Sodium 139 133 - 144 mmol/L    Potassium 4.1 3.4 - 5.3 mmol/L    Chloride 106 94 - 109 mmol/L    Carbon Dioxide 27 20 - 32 mmol/L    Anion Gap 6 3 - 14 mmol/L    Glucose 93 70 - 99 mg/dL    Urea Nitrogen 18 7 - 30 mg/dL    Creatinine 1.05 0.66 - 1.25 mg/dL    GFR Estimate 78 >60 mL/min/[1.73_m2]    GFR Estimate If Black >90 >60 mL/min/[1.73_m2]    Calcium 9.1 8.5 - 10.1 mg/dL   CBC with platelets and differential   Result Value Ref Range    WBC 4.9 4.0 - 11.0 10e9/L    RBC Count 4.31 (L) 4.4 - 5.9 10e12/L    Hemoglobin 13.8 13.3 - 17.7 g/dL    Hematocrit 39.9 (L) 40.0 - 53.0 %    MCV 93 78 - 100 fl    MCH 32.0 26.5 - 33.0 pg    MCHC 34.6 31.5 - 36.5 g/dL    RDW 12.3 10.0 - 15.0 %    Platelet Count 266 150 - 450 10e9/L    Diff Method Automated Method     % Neutrophils 53.0 %    % Lymphocytes 29.4 %    % Monocytes 13.8 %    % Eosinophils 2.4 %    % Basophils 1.0 %    % Immature Granulocytes 0.4 %    Nucleated RBCs 0 0 /100    Absolute Neutrophil 2.6 1.6 - 8.3 10e9/L    Absolute  Lymphocytes 1.5 0.8 - 5.3 10e9/L    Absolute Monocytes 0.7 0.0 - 1.3 10e9/L    Absolute Eosinophils 0.1 0.0 - 0.7 10e9/L    Absolute Basophils 0.1 0.0 - 0.2 10e9/L    Abs Immature Granulocytes 0.0 0 - 0.4 10e9/L    Absolute Nucleated RBC 0.0

## 2019-01-29 NOTE — PROGRESS NOTES
[facility] [place]  [address]  [place] [address]  [phone]  Dept: [phone]    PRE-OP EVALUATION:  Today's date: 2019    Joseph Stanford (: 1960) presents for pre-operative evaluation assessment as requested by Dr. Cross.  He requires evaluation and anesthesia risk assessment prior to undergoing surgery/procedure for treatment of cataract .    Proposed Surgery/ Procedure: right eye cataract surgery  Date of Surgery/ Procedure: 19  Time of Surgery/ Procedure: unknown  Hospital/Surgical Facility: [facility]  Fax number for surgical facility:   Primary Physician: Ayo Sanchez  Type of Anesthesia Anticipated: to be determined    Patient has a Health Care Directive or Living Will:  NO    1. NO - Do you have a history of heart attack, stroke, stent, bypass or surgery on an artery in the head, neck, heart or legs?  2. NO - Do you ever have any pain or discomfort in your chest?  3. NO - Do you have a history of  Heart Failure?  4. NO - Are you troubled by shortness of breath when: walking on the level, up a slight hill or at night?  5. NO - Do you currently have a cold, bronchitis or other respiratory infection?  6. NO - Do you have a cough, shortness of breath or wheezing?  7. NO - Do you sometimes get pains in the calves of your legs when you walk?  8. NO - Do you or anyone in your family have previous history of blood clots?  9. NO - Do you or does anyone in your family have a serious bleeding problem such as prolonged bleeding following surgeries or cuts?  10. NO - Have you ever had problems with anemia or been told to take iron pills?  11. NO - Have you had any abnormal blood loss such as black, tarry or bloody stools, or abnormal vaginal bleeding?  12. NO - Have you ever had a blood transfusion?  13. NO - Have you or any of your relatives ever had problems with anesthesia?  14. NO - Do you have sleep apnea, excessive snoring or daytime drowsiness?  15. NO - Do you have any  prosthetic heart valves?  16. NO - Do you have prosthetic joints?  17. NO - Is there any chance that you may be pregnant?      HPI:     HPI related to upcoming procedure:   60 yO male presents for cardiopulmonary/general clearance to undergo the above procedure.  His surgeon listed above has asked for this clearance to undergo anesthesia. Pt has had this condition for approximately less than a year .   Overall pt is of good health and has not  had any perioperative complications with previous surgeries.          See problem list for active medical problems.  Problems all longstanding and stable, except as noted/documented.  See ROS for pertinent symptoms related to these conditions.                                                                                                                                                          .    MEDICAL HISTORY:     Patient Active Problem List    Diagnosis Date Noted     DDD (degenerative disc disease), cervical 01/09/2019     Priority: Medium     Need for prophylactic vaccination and inoculation against influenza 01/09/2019     Priority: Medium     Nocturia 01/18/2017     Priority: Medium     Benign non-nodular prostatic hyperplasia 01/18/2017     Priority: Medium     ACP (advance care planning) 01/18/2017     Priority: Medium     Advance Care Planning 1/18/2017: ACP Review of Chart / Resources Provided:  Reviewed chart for advance care plan.  Joseph Stanford has been provided information and resources to begin or update their advance care plan.  Added by Price Obando             Primary osteoarthritis involving multiple joints 01/27/2016     Priority: Medium     Tobacco abuse, in remission      Priority: Medium     Lumbar transverse process fracture (H) 01/28/2014     Priority: Medium     Advanced care planning/counseling discussion 08/14/2012     Priority: Medium     Hypertrophy of prostate without urinary obstruction 08/14/2012     Priority: Medium     Problem list  name updated by automated process. Provider to review       Mixed hyperlipidemia 02/08/2011     Priority: Medium      Past Medical History:   Diagnosis Date     BPH (benign prostatic hypertrophy) 8/14/2012     Diverticulosis 12/20/2011 2003     Mixed hyperlipidemia 2/8/2011     Peyronie's disease 12/20/2011     Tobacco abuse, in remission      Tobacco use disorder 2/8/2011    Quit 8/2013     Past Surgical History:   Procedure Laterality Date     ARTHROSCOPY KNEE  1980    left     ARTHROSCOPY KNEE  2000    left     colon resection  2004     COLONOSCOPY  2003    repeat 2013     COLONOSCOPY N/A 2/5/2015    Repeat in 2025//Procedure: COLONOSCOPY;  Surgeon: Keshia Vivar MD;  Location: [facility]     COLOSTOMY  2003    Diverticulosis with perforation     Current Outpatient Medications   Medication Sig Dispense Refill     aspirin 81 MG chewable tablet Take 1 tablet (81 mg) by mouth daily 30 tablet      dutasteride (AVODART) 0.5 MG capsule TAKE 1 CAPSULE BY MOUTH ONCE DAILY - GENERIC FOR AVODART 90 capsule 3     fenofibrate (TRICOR) 145 MG tablet TAKE 1 TABLET DAILY BY MOUTH 90 tablet 3     simvastatin (ZOCOR) 20 MG tablet Take 1 tablet (20 mg) by mouth At Bedtime 60 tablet 3     VITAMIN E NATURAL PO Take 400 Units by mouth daily       OTC products: None, except as noted above    No Known Allergies   Latex Allergy: NO    Social History     Tobacco Use     Smoking status: Former Smoker     Packs/day: 1.00     Years: 30.00     Pack years: 30.00     Types: Cigarettes     Smokeless tobacco: Never Used   Substance Use Topics     Alcohol use: Yes     Comment: socially     History   Drug Use No       REVIEW OF SYSTEMS:   Constitutional, neuro, ENT, endocrine, pulmonary, cardiac, gastrointestinal, genitourinary, musculoskeletal, integument and psychiatric systems are negative, except as otherwise noted.    EXAM:   There were no vitals taken for this visit.    GENERAL APPEARANCE: healthy, alert and no distress     EYES: EOMI,   PERRL     HENT: ear canals and TM's normal and nose and mouth without ulcers or lesions     NECK: no adenopathy, no asymmetry, masses, or scars and thyroid normal to palpation     RESP: lungs clear to auscultation - no rales, rhonchi or wheezes     CV: regular rates and rhythm, normal S1 S2, no S3 or S4 and no murmur, click or rub     ABDOMEN:  soft, nontender, no HSM or masses and bowel sounds normal     MS: extremities normal- no gross deformities noted, no evidence of inflammation in joints, FROM in all extremities.     SKIN: no suspicious lesions or rashes     NEURO: Normal strength and tone, sensory exam grossly normal, mentation intact and speech normal     PSYCH: mentation appears normal. and affect normal/bright     LYMPHATICS: No cervical adenopathy    DIAGNOSTICS:   EKG: appears normal, NSR, Normal Sinus Rhythm, normal axis, normal intervals, no acute ST/T changes c/w ischemia, no LVH by voltage criteria, Right Bundle Branch Block, unchanged from previous tracings    Recent Labs   Lab Test 01/09/19  0749 03/20/18  1058   HGB 13.8 14.6    272    138   POTASSIUM 3.7 4.0   CR 1.11 1.06      Results for orders placed or performed in visit on 02/05/19   Basic metabolic panel   Result Value Ref Range    Sodium 139 133 - 144 mmol/L    Potassium 4.1 3.4 - 5.3 mmol/L    Chloride 106 94 - 109 mmol/L    Carbon Dioxide 27 20 - 32 mmol/L    Anion Gap 6 3 - 14 mmol/L    Glucose 93 70 - 99 mg/dL    Urea Nitrogen 18 7 - 30 mg/dL    Creatinine 1.05 0.66 - 1.25 mg/dL    GFR Estimate 78 >60 mL/min/[1.73_m2]    GFR Estimate If Black >90 >60 mL/min/[1.73_m2]    Calcium 9.1 8.5 - 10.1 mg/dL   CBC with platelets and differential   Result Value Ref Range    WBC 4.9 4.0 - 11.0 10e9/L    RBC Count 4.31 (L) 4.4 - 5.9 10e12/L    Hemoglobin 13.8 13.3 - 17.7 g/dL    Hematocrit 39.9 (L) 40.0 - 53.0 %    MCV 93 78 - 100 fl    MCH 32.0 26.5 - 33.0 pg    MCHC 34.6 31.5 - 36.5 g/dL    RDW 12.3 10.0 - 15.0 %    Platelet Count  266 150 - 450 10e9/L    Diff Method Automated Method     % Neutrophils 53.0 %    % Lymphocytes 29.4 %    % Monocytes 13.8 %    % Eosinophils 2.4 %    % Basophils 1.0 %    % Immature Granulocytes 0.4 %    Nucleated RBCs 0 0 /100    Absolute Neutrophil 2.6 1.6 - 8.3 10e9/L    Absolute Lymphocytes 1.5 0.8 - 5.3 10e9/L    Absolute Monocytes 0.7 0.0 - 1.3 10e9/L    Absolute Eosinophils 0.1 0.0 - 0.7 10e9/L    Absolute Basophils 0.1 0.0 - 0.2 10e9/L    Abs Immature Granulocytes 0.0 0 - 0.4 10e9/L    Absolute Nucleated RBC 0.0        IMPRESSION:   Reason for surgery/procedure: Right cataract - symptomatic / left cataract - not ready for removal   Diagnosis/reason for consult: Cardiopulmonary clearance     The proposed surgical procedure is considered LOW risk.    REVISED CARDIAC RISK INDEX  The patient has the following serious cardiovascular risks for perioperative complications such as (MI, PE, VFib and 3  AV Block):  No serious cardiac risks  INTERPRETATION: 0 risks: Class I (very low risk - 0.4% complication rate)    The patient has the following additional risks for perioperative complications:  No identified additional risks      ICD-10-CM    1. Preop general physical exam Z01.818        RECOMMENDATIONS:             APPROVAL GIVEN to proceed with proposed procedure, without further diagnostic evaluation       Signed Electronically by: Ayo Sanchez MD    Copy of this evaluation report is provided to requesting physician.    [facility] Preop Guidelines    Revised Cardiac Risk Index

## 2019-01-29 NOTE — H&P (VIEW-ONLY)
[facility] [place]  [address]  [place] [address]  [phone]  Dept: [phone]    PRE-OP EVALUATION:  Today's date: 2019    Joseph Stanford (: 1960) presents for pre-operative evaluation assessment as requested by Dr. Cross.  He requires evaluation and anesthesia risk assessment prior to undergoing surgery/procedure for treatment of cataract .    Proposed Surgery/ Procedure: right eye cataract surgery  Date of Surgery/ Procedure: 19  Time of Surgery/ Procedure: unknown  Hospital/Surgical Facility: [facility]  Fax number for surgical facility:   Primary Physician: Ayo Sanchez  Type of Anesthesia Anticipated: to be determined    Patient has a Health Care Directive or Living Will:  NO    1. NO - Do you have a history of heart attack, stroke, stent, bypass or surgery on an artery in the head, neck, heart or legs?  2. NO - Do you ever have any pain or discomfort in your chest?  3. NO - Do you have a history of  Heart Failure?  4. NO - Are you troubled by shortness of breath when: walking on the level, up a slight hill or at night?  5. NO - Do you currently have a cold, bronchitis or other respiratory infection?  6. NO - Do you have a cough, shortness of breath or wheezing?  7. NO - Do you sometimes get pains in the calves of your legs when you walk?  8. NO - Do you or anyone in your family have previous history of blood clots?  9. NO - Do you or does anyone in your family have a serious bleeding problem such as prolonged bleeding following surgeries or cuts?  10. NO - Have you ever had problems with anemia or been told to take iron pills?  11. NO - Have you had any abnormal blood loss such as black, tarry or bloody stools, or abnormal vaginal bleeding?  12. NO - Have you ever had a blood transfusion?  13. NO - Have you or any of your relatives ever had problems with anesthesia?  14. NO - Do you have sleep apnea, excessive snoring or daytime drowsiness?  15. NO - Do you have any  prosthetic heart valves?  16. NO - Do you have prosthetic joints?  17. NO - Is there any chance that you may be pregnant?      HPI:     HPI related to upcoming procedure:   60 yO male presents for cardiopulmonary/general clearance to undergo the above procedure.  His surgeon listed above has asked for this clearance to undergo anesthesia. Pt has had this condition for approximately less than a year .   Overall pt is of good health and has not  had any perioperative complications with previous surgeries.          See problem list for active medical problems.  Problems all longstanding and stable, except as noted/documented.  See ROS for pertinent symptoms related to these conditions.                                                                                                                                                          .    MEDICAL HISTORY:     Patient Active Problem List    Diagnosis Date Noted     DDD (degenerative disc disease), cervical 01/09/2019     Priority: Medium     Need for prophylactic vaccination and inoculation against influenza 01/09/2019     Priority: Medium     Nocturia 01/18/2017     Priority: Medium     Benign non-nodular prostatic hyperplasia 01/18/2017     Priority: Medium     ACP (advance care planning) 01/18/2017     Priority: Medium     Advance Care Planning 1/18/2017: ACP Review of Chart / Resources Provided:  Reviewed chart for advance care plan.  Joseph Stanford has been provided information and resources to begin or update their advance care plan.  Added by Price Obando             Primary osteoarthritis involving multiple joints 01/27/2016     Priority: Medium     Tobacco abuse, in remission      Priority: Medium     Lumbar transverse process fracture (H) 01/28/2014     Priority: Medium     Advanced care planning/counseling discussion 08/14/2012     Priority: Medium     Hypertrophy of prostate without urinary obstruction 08/14/2012     Priority: Medium     Problem list  name updated by automated process. Provider to review       Mixed hyperlipidemia 02/08/2011     Priority: Medium      Past Medical History:   Diagnosis Date     BPH (benign prostatic hypertrophy) 8/14/2012     Diverticulosis 12/20/2011 2003     Mixed hyperlipidemia 2/8/2011     Peyronie's disease 12/20/2011     Tobacco abuse, in remission      Tobacco use disorder 2/8/2011    Quit 8/2013     Past Surgical History:   Procedure Laterality Date     ARTHROSCOPY KNEE  1980    left     ARTHROSCOPY KNEE  2000    left     colon resection  2004     COLONOSCOPY  2003    repeat 2013     COLONOSCOPY N/A 2/5/2015    Repeat in 2025//Procedure: COLONOSCOPY;  Surgeon: Keshia Vivar MD;  Location: [facility]     COLOSTOMY  2003    Diverticulosis with perforation     Current Outpatient Medications   Medication Sig Dispense Refill     aspirin 81 MG chewable tablet Take 1 tablet (81 mg) by mouth daily 30 tablet      dutasteride (AVODART) 0.5 MG capsule TAKE 1 CAPSULE BY MOUTH ONCE DAILY - GENERIC FOR AVODART 90 capsule 3     fenofibrate (TRICOR) 145 MG tablet TAKE 1 TABLET DAILY BY MOUTH 90 tablet 3     simvastatin (ZOCOR) 20 MG tablet Take 1 tablet (20 mg) by mouth At Bedtime 60 tablet 3     VITAMIN E NATURAL PO Take 400 Units by mouth daily       OTC products: None, except as noted above    No Known Allergies   Latex Allergy: NO    Social History     Tobacco Use     Smoking status: Former Smoker     Packs/day: 1.00     Years: 30.00     Pack years: 30.00     Types: Cigarettes     Smokeless tobacco: Never Used   Substance Use Topics     Alcohol use: Yes     Comment: socially     History   Drug Use No       REVIEW OF SYSTEMS:   Constitutional, neuro, ENT, endocrine, pulmonary, cardiac, gastrointestinal, genitourinary, musculoskeletal, integument and psychiatric systems are negative, except as otherwise noted.    EXAM:   There were no vitals taken for this visit.    GENERAL APPEARANCE: healthy, alert and no distress     EYES: EOMI,   PERRL     HENT: ear canals and TM's normal and nose and mouth without ulcers or lesions     NECK: no adenopathy, no asymmetry, masses, or scars and thyroid normal to palpation     RESP: lungs clear to auscultation - no rales, rhonchi or wheezes     CV: regular rates and rhythm, normal S1 S2, no S3 or S4 and no murmur, click or rub     ABDOMEN:  soft, nontender, no HSM or masses and bowel sounds normal     MS: extremities normal- no gross deformities noted, no evidence of inflammation in joints, FROM in all extremities.     SKIN: no suspicious lesions or rashes     NEURO: Normal strength and tone, sensory exam grossly normal, mentation intact and speech normal     PSYCH: mentation appears normal. and affect normal/bright     LYMPHATICS: No cervical adenopathy    DIAGNOSTICS:   EKG: appears normal, NSR, Normal Sinus Rhythm, normal axis, normal intervals, no acute ST/T changes c/w ischemia, no LVH by voltage criteria, Right Bundle Branch Block, unchanged from previous tracings    Recent Labs   Lab Test 01/09/19  0749 03/20/18  1058   HGB 13.8 14.6    272    138   POTASSIUM 3.7 4.0   CR 1.11 1.06      Results for orders placed or performed in visit on 02/05/19   Basic metabolic panel   Result Value Ref Range    Sodium 139 133 - 144 mmol/L    Potassium 4.1 3.4 - 5.3 mmol/L    Chloride 106 94 - 109 mmol/L    Carbon Dioxide 27 20 - 32 mmol/L    Anion Gap 6 3 - 14 mmol/L    Glucose 93 70 - 99 mg/dL    Urea Nitrogen 18 7 - 30 mg/dL    Creatinine 1.05 0.66 - 1.25 mg/dL    GFR Estimate 78 >60 mL/min/[1.73_m2]    GFR Estimate If Black >90 >60 mL/min/[1.73_m2]    Calcium 9.1 8.5 - 10.1 mg/dL   CBC with platelets and differential   Result Value Ref Range    WBC 4.9 4.0 - 11.0 10e9/L    RBC Count 4.31 (L) 4.4 - 5.9 10e12/L    Hemoglobin 13.8 13.3 - 17.7 g/dL    Hematocrit 39.9 (L) 40.0 - 53.0 %    MCV 93 78 - 100 fl    MCH 32.0 26.5 - 33.0 pg    MCHC 34.6 31.5 - 36.5 g/dL    RDW 12.3 10.0 - 15.0 %    Platelet Count  266 150 - 450 10e9/L    Diff Method Automated Method     % Neutrophils 53.0 %    % Lymphocytes 29.4 %    % Monocytes 13.8 %    % Eosinophils 2.4 %    % Basophils 1.0 %    % Immature Granulocytes 0.4 %    Nucleated RBCs 0 0 /100    Absolute Neutrophil 2.6 1.6 - 8.3 10e9/L    Absolute Lymphocytes 1.5 0.8 - 5.3 10e9/L    Absolute Monocytes 0.7 0.0 - 1.3 10e9/L    Absolute Eosinophils 0.1 0.0 - 0.7 10e9/L    Absolute Basophils 0.1 0.0 - 0.2 10e9/L    Abs Immature Granulocytes 0.0 0 - 0.4 10e9/L    Absolute Nucleated RBC 0.0        IMPRESSION:   Reason for surgery/procedure: Right cataract - symptomatic / left cataract - not ready for removal   Diagnosis/reason for consult: Cardiopulmonary clearance     The proposed surgical procedure is considered LOW risk.    REVISED CARDIAC RISK INDEX  The patient has the following serious cardiovascular risks for perioperative complications such as (MI, PE, VFib and 3  AV Block):  No serious cardiac risks  INTERPRETATION: 0 risks: Class I (very low risk - 0.4% complication rate)    The patient has the following additional risks for perioperative complications:  No identified additional risks      ICD-10-CM    1. Preop general physical exam Z01.818        RECOMMENDATIONS:             APPROVAL GIVEN to proceed with proposed procedure, without further diagnostic evaluation       Signed Electronically by: Ayo Sanchez MD    Copy of this evaluation report is provided to requesting physician.    [facility] Preop Guidelines    Revised Cardiac Risk Index

## 2019-02-05 ENCOUNTER — OFFICE VISIT (OUTPATIENT)
Dept: FAMILY MEDICINE | Facility: OTHER | Age: 59
End: 2019-02-05
Attending: FAMILY MEDICINE
Payer: COMMERCIAL

## 2019-02-05 VITALS
TEMPERATURE: 97.5 F | HEART RATE: 70 BPM | BODY MASS INDEX: 28.49 KG/M2 | OXYGEN SATURATION: 98 % | WEIGHT: 222 LBS | DIASTOLIC BLOOD PRESSURE: 64 MMHG | HEIGHT: 74 IN | SYSTOLIC BLOOD PRESSURE: 118 MMHG

## 2019-02-05 DIAGNOSIS — H25.9 AGE-RELATED CATARACT OF BOTH EYES, UNSPECIFIED AGE-RELATED CATARACT TYPE: ICD-10-CM

## 2019-02-05 DIAGNOSIS — F17.201 TOBACCO ABUSE, IN REMISSION: ICD-10-CM

## 2019-02-05 DIAGNOSIS — E78.2 MIXED HYPERLIPIDEMIA: ICD-10-CM

## 2019-02-05 DIAGNOSIS — Z01.818 PREOP GENERAL PHYSICAL EXAM: Primary | ICD-10-CM

## 2019-02-05 DIAGNOSIS — Z01.818 PREOP GENERAL PHYSICAL EXAM: ICD-10-CM

## 2019-02-05 LAB
ANION GAP SERPL CALCULATED.3IONS-SCNC: 6 MMOL/L (ref 3–14)
BASOPHILS # BLD AUTO: 0.1 10E9/L (ref 0–0.2)
BASOPHILS NFR BLD AUTO: 1 %
BUN SERPL-MCNC: 18 MG/DL (ref 7–30)
CALCIUM SERPL-MCNC: 9.1 MG/DL (ref 8.5–10.1)
CHLORIDE SERPL-SCNC: 106 MMOL/L (ref 94–109)
CO2 SERPL-SCNC: 27 MMOL/L (ref 20–32)
CREAT SERPL-MCNC: 1.05 MG/DL (ref 0.66–1.25)
DIFFERENTIAL METHOD BLD: ABNORMAL
EOSINOPHIL # BLD AUTO: 0.1 10E9/L (ref 0–0.7)
EOSINOPHIL NFR BLD AUTO: 2.4 %
ERYTHROCYTE [DISTWIDTH] IN BLOOD BY AUTOMATED COUNT: 12.3 % (ref 10–15)
GFR SERPL CREATININE-BSD FRML MDRD: 78 ML/MIN/{1.73_M2}
GLUCOSE SERPL-MCNC: 93 MG/DL (ref 70–99)
HCT VFR BLD AUTO: 39.9 % (ref 40–53)
HGB BLD-MCNC: 13.8 G/DL (ref 13.3–17.7)
IMM GRANULOCYTES # BLD: 0 10E9/L (ref 0–0.4)
IMM GRANULOCYTES NFR BLD: 0.4 %
LYMPHOCYTES # BLD AUTO: 1.5 10E9/L (ref 0.8–5.3)
LYMPHOCYTES NFR BLD AUTO: 29.4 %
MCH RBC QN AUTO: 32 PG (ref 26.5–33)
MCHC RBC AUTO-ENTMCNC: 34.6 G/DL (ref 31.5–36.5)
MCV RBC AUTO: 93 FL (ref 78–100)
MONOCYTES # BLD AUTO: 0.7 10E9/L (ref 0–1.3)
MONOCYTES NFR BLD AUTO: 13.8 %
NEUTROPHILS # BLD AUTO: 2.6 10E9/L (ref 1.6–8.3)
NEUTROPHILS NFR BLD AUTO: 53 %
NRBC # BLD AUTO: 0 10*3/UL
NRBC BLD AUTO-RTO: 0 /100
PLATELET # BLD AUTO: 266 10E9/L (ref 150–450)
POTASSIUM SERPL-SCNC: 4.1 MMOL/L (ref 3.4–5.3)
RBC # BLD AUTO: 4.31 10E12/L (ref 4.4–5.9)
SODIUM SERPL-SCNC: 139 MMOL/L (ref 133–144)
WBC # BLD AUTO: 4.9 10E9/L (ref 4–11)

## 2019-02-05 PROCEDURE — 99214 OFFICE O/P EST MOD 30 MIN: CPT | Performed by: FAMILY MEDICINE

## 2019-02-05 PROCEDURE — 36415 COLL VENOUS BLD VENIPUNCTURE: CPT | Performed by: FAMILY MEDICINE

## 2019-02-05 PROCEDURE — 93000 ELECTROCARDIOGRAM COMPLETE: CPT | Performed by: INTERNAL MEDICINE

## 2019-02-05 PROCEDURE — 85025 COMPLETE CBC W/AUTO DIFF WBC: CPT | Performed by: FAMILY MEDICINE

## 2019-02-05 PROCEDURE — 80048 BASIC METABOLIC PNL TOTAL CA: CPT | Performed by: FAMILY MEDICINE

## 2019-02-05 ASSESSMENT — PAIN SCALES - GENERAL: PAINLEVEL: NO PAIN (0)

## 2019-02-05 ASSESSMENT — MIFFLIN-ST. JEOR: SCORE: 1896.74

## 2019-02-05 NOTE — NURSING NOTE
"Chief Complaint   Patient presents with     Pre-Op Exam       Initial /64   Pulse 70   Temp 97.5  F (36.4  C)   Ht 1.88 m (6' 2\")   Wt 100.7 kg (222 lb)   SpO2 98%   BMI 28.50 kg/m   Estimated body mass index is 28.5 kg/m  as calculated from the following:    Height as of this encounter: 1.88 m (6' 2\").    Weight as of this encounter: 100.7 kg (222 lb).  Medication Reconciliation: complete    Price Obando LPN  "

## 2019-02-25 ENCOUNTER — ANESTHESIA EVENT (OUTPATIENT)
Dept: SURGERY | Facility: HOSPITAL | Age: 59
End: 2019-02-25
Payer: COMMERCIAL

## 2019-02-25 ASSESSMENT — LIFESTYLE VARIABLES: TOBACCO_USE: 1

## 2019-02-25 NOTE — ANESTHESIA PREPROCEDURE EVALUATION
Anesthesia Pre-Procedure Evaluation    Patient: Joseph Stanford   MRN: [id number] : 1960          Preoperative Diagnosis: COMBINED FORMS OF AGE RELATED CATARACT RIGHT EYE    Procedure(s):  PHACOEMULSIFICATION CATARACT EXTRACTION POSTERIOR CHAMBER LENS RIGHT    Past Medical History:   Diagnosis Date     BPH (benign prostatic hypertrophy) 2012     Diverticulosis 2011     Mixed hyperlipidemia 2011     Peyronie's disease 2011     Tobacco abuse, in remission      Tobacco use disorder 2011    Quit 2013     Past Surgical History:   Procedure Laterality Date     ARTHROSCOPY KNEE      left     ARTHROSCOPY KNEE      left     colon resection       COLONOSCOPY      repeat      COLONOSCOPY N/A 2015    Repeat in Procedure: COLONOSCOPY;  Surgeon: Keshia Vivar MD;  Location: [facility]     COLOSTOMY      Diverticulosis with perforation       Anesthesia Evaluation     . Pt has had prior anesthetic.     No history of anesthetic complications          ROS/MED HX    ENT/Pulmonary:     (+)tobacco use, Past use quit  packs/day  , . .    Neurologic:  - neg neurologic ROS     Cardiovascular:     (+) Dyslipidemia, ----. : . . . :. .       METS/Exercise Tolerance:     Hematologic:  - neg hematologic  ROS       Musculoskeletal: Comment: DDD  (+) arthritis, , , -       GI/Hepatic:     (+) Other GI/Hepatic Diverticulosis      Renal/Genitourinary:     (+) BPH,       Endo:  - neg endo ROS       Psychiatric:  - neg psychiatric ROS       Infectious Disease:  - neg infectious disease ROS       Malignancy:      - no malignancy   Other:    - neg other ROS                      Physical Exam  Normal systems: cardiovascular, pulmonary and dental    Airway   Mallampati: III  TM distance: >3 FB  Neck ROM: full    Dental     Cardiovascular   Rhythm and rate: regular and normal      Pulmonary    breath sounds clear to auscultation            Lab Results   Component Value Date  "   WBC 4.9 02/05/2019    HGB 13.8 02/05/2019    HCT 39.9 (L) 02/05/2019     02/05/2019     02/05/2019    POTASSIUM 4.1 02/05/2019    CHLORIDE 106 02/05/2019    CO2 27 02/05/2019    BUN 18 02/05/2019    CR 1.05 02/05/2019    GLC 93 02/05/2019    CHANTEL 9.1 02/05/2019    ALBUMIN 4.2 01/09/2019    PROTTOTAL 7.4 01/09/2019    ALT 36 01/09/2019    AST 17 01/09/2019    ALKPHOS 24 (L) 01/09/2019    BILITOTAL 0.5 01/09/2019    TSH 1.91 03/23/2016       Preop Vitals  BP Readings from Last 3 Encounters:   02/05/19 118/64   01/09/19 124/70   03/20/18 128/76    Pulse Readings from Last 3 Encounters:   02/05/19 70   01/09/19 71   03/20/18 77      Resp Readings from Last 3 Encounters:   01/27/16 17   02/05/15 20   01/21/15 18    SpO2 Readings from Last 3 Encounters:   02/05/19 98%   01/09/19 98%   03/20/18 99%      Temp Readings from Last 1 Encounters:   02/05/19 97.5  F (36.4  C)    Ht Readings from Last 1 Encounters:   02/05/19 1.88 m (6' 2\")      Wt Readings from Last 1 Encounters:   02/05/19 100.7 kg (222 lb)    Estimated body mass index is 28.5 kg/m  as calculated from the following:    Height as of 2/5/19: 1.88 m (6' 2\").    Weight as of 2/5/19: 100.7 kg (222 lb).       Anesthesia Plan      History & Physical Review  History and physical reviewed and following examination; no interval change.    ASA Status:  2 .    NPO Status:  > 8 hours    Plan for MAC with Other induction. Maintenance will be Other.  Reason for MAC:  Procedure to face, neck, head or breast  PONV prophylaxis:  Ondansetron (or other 5HT-3)       Postoperative Care  Postoperative pain management:  IV analgesics.      Consents  Anesthetic plan, risks, benefits and alternatives discussed with:  Patient..                 ARMANDO Low CRNA  "

## 2019-02-26 ENCOUNTER — ANESTHESIA (OUTPATIENT)
Dept: SURGERY | Facility: HOSPITAL | Age: 59
End: 2019-02-26
Payer: COMMERCIAL

## 2019-02-26 ENCOUNTER — HOSPITAL ENCOUNTER (OUTPATIENT)
Facility: HOSPITAL | Age: 59
Discharge: HOME | End: 2019-02-26
Attending: OPHTHALMOLOGY | Admitting: OPHTHALMOLOGY
Payer: COMMERCIAL

## 2019-02-26 VITALS
HEART RATE: 81 BPM | WEIGHT: 224 LBS | SYSTOLIC BLOOD PRESSURE: 111 MMHG | DIASTOLIC BLOOD PRESSURE: 74 MMHG | RESPIRATION RATE: 20 BRPM | OXYGEN SATURATION: 96 % | BODY MASS INDEX: 28.75 KG/M2 | TEMPERATURE: 97.8 F | HEIGHT: 74 IN

## 2019-02-26 PROCEDURE — C9447 INJ, PHENYLEPHRINE KETOROLAC: HCPCS | Performed by: OPHTHALMOLOGY

## 2019-02-26 PROCEDURE — 25000132 ZZH RX MED GY IP 250 OP 250 PS 637: Performed by: OPHTHALMOLOGY

## 2019-02-26 PROCEDURE — 71000027 ZZH RECOVERY PHASE 2 EACH 15 MINS: Performed by: OPHTHALMOLOGY

## 2019-02-26 PROCEDURE — 27210794 ZZH OR GENERAL SUPPLY STERILE: Performed by: OPHTHALMOLOGY

## 2019-02-26 PROCEDURE — 25000128 H RX IP 250 OP 636: Performed by: NURSE ANESTHETIST, CERTIFIED REGISTERED

## 2019-02-26 PROCEDURE — 01999 UNLISTED ANES PROCEDURE: CPT | Performed by: NURSE ANESTHETIST, CERTIFIED REGISTERED

## 2019-02-26 PROCEDURE — 40000306 ZZH STATISTIC PRE PROC ASSESS II: Performed by: OPHTHALMOLOGY

## 2019-02-26 PROCEDURE — 36000056 ZZH SURGERY LEVEL 3 1ST 30 MIN: Performed by: OPHTHALMOLOGY

## 2019-02-26 PROCEDURE — 66984 XCAPSL CTRC RMVL W/O ECP: CPT | Performed by: ANESTHESIOLOGY

## 2019-02-26 PROCEDURE — 25000128 H RX IP 250 OP 636: Performed by: OPHTHALMOLOGY

## 2019-02-26 PROCEDURE — 25000125 ZZHC RX 250: Performed by: OPHTHALMOLOGY

## 2019-02-26 PROCEDURE — V2632 POST CHMBR INTRAOCULAR LENS: HCPCS | Performed by: OPHTHALMOLOGY

## 2019-02-26 PROCEDURE — 37000008 ZZH ANESTHESIA TECHNICAL FEE, 1ST 30 MIN: Performed by: OPHTHALMOLOGY

## 2019-02-26 DEVICE — LENS-SOFPORT 17.0: Type: IMPLANTABLE DEVICE | Site: EYE | Status: FUNCTIONAL

## 2019-02-26 RX ORDER — NALOXONE HYDROCHLORIDE 0.4 MG/ML
.1-.4 INJECTION, SOLUTION INTRAMUSCULAR; INTRAVENOUS; SUBCUTANEOUS
Status: DISCONTINUED | OUTPATIENT
Start: 2019-02-26 | End: 2019-02-26 | Stop reason: HOSPADM

## 2019-02-26 RX ORDER — LIDOCAINE HYDROCHLORIDE 10 MG/ML
INJECTION, SOLUTION EPIDURAL; INFILTRATION; INTRACAUDAL; PERINEURAL PRN
Status: DISCONTINUED | OUTPATIENT
Start: 2019-02-26 | End: 2019-02-26 | Stop reason: HOSPADM

## 2019-02-26 RX ORDER — LEVOBUNOLOL HYDROCHLORIDE 5 MG/ML
SOLUTION/ DROPS OPHTHALMIC PRN
Status: DISCONTINUED | OUTPATIENT
Start: 2019-02-26 | End: 2019-02-26 | Stop reason: HOSPADM

## 2019-02-26 RX ORDER — MOXIFLOXACIN 5 MG/ML
SOLUTION/ DROPS OPHTHALMIC PRN
Status: DISCONTINUED | OUTPATIENT
Start: 2019-02-26 | End: 2019-02-26 | Stop reason: HOSPADM

## 2019-02-26 RX ORDER — PILOCARPINE HYDROCHLORIDE 10 MG/ML
SOLUTION/ DROPS OPHTHALMIC PRN
Status: DISCONTINUED | OUTPATIENT
Start: 2019-02-26 | End: 2019-02-26 | Stop reason: HOSPADM

## 2019-02-26 RX ORDER — PHENYLEPHRINE HYDROCHLORIDE 100 MG/ML
1 SOLUTION/ DROPS OPHTHALMIC
Status: DISCONTINUED | OUTPATIENT
Start: 2019-02-26 | End: 2019-02-26 | Stop reason: HOSPADM

## 2019-02-26 RX ORDER — TETRACAINE HYDROCHLORIDE 5 MG/ML
SOLUTION OPHTHALMIC PRN
Status: DISCONTINUED | OUTPATIENT
Start: 2019-02-26 | End: 2019-02-26 | Stop reason: HOSPADM

## 2019-02-26 RX ORDER — PROPARACAINE HYDROCHLORIDE 5 MG/ML
1 SOLUTION/ DROPS OPHTHALMIC ONCE
Status: COMPLETED | OUTPATIENT
Start: 2019-02-26 | End: 2019-02-26

## 2019-02-26 RX ORDER — ONDANSETRON 4 MG/1
4 TABLET, ORALLY DISINTEGRATING ORAL EVERY 30 MIN PRN
Status: DISCONTINUED | OUTPATIENT
Start: 2019-02-26 | End: 2019-02-26 | Stop reason: HOSPADM

## 2019-02-26 RX ORDER — ONDANSETRON 2 MG/ML
4 INJECTION INTRAMUSCULAR; INTRAVENOUS EVERY 30 MIN PRN
Status: DISCONTINUED | OUTPATIENT
Start: 2019-02-26 | End: 2019-02-26 | Stop reason: HOSPADM

## 2019-02-26 RX ORDER — SODIUM CHLORIDE, SODIUM LACTATE, POTASSIUM CHLORIDE, CALCIUM CHLORIDE 600; 310; 30; 20 MG/100ML; MG/100ML; MG/100ML; MG/100ML
INJECTION, SOLUTION INTRAVENOUS CONTINUOUS
Status: DISCONTINUED | OUTPATIENT
Start: 2019-02-26 | End: 2019-02-26 | Stop reason: HOSPADM

## 2019-02-26 RX ORDER — PHENYLEPHRINE HYDROCHLORIDE 25 MG/ML
1 SOLUTION/ DROPS OPHTHALMIC
Status: COMPLETED | OUTPATIENT
Start: 2019-02-26 | End: 2019-02-26

## 2019-02-26 RX ORDER — LIDOCAINE 40 MG/G
CREAM TOPICAL
Status: DISCONTINUED | OUTPATIENT
Start: 2019-02-26 | End: 2019-02-26 | Stop reason: HOSPADM

## 2019-02-26 RX ORDER — CYCLOPENTOLATE HYDROCHLORIDE 20 MG/ML
1 SOLUTION/ DROPS OPHTHALMIC
Status: COMPLETED | OUTPATIENT
Start: 2019-02-26 | End: 2019-02-26

## 2019-02-26 RX ORDER — ACETAMINOPHEN 325 MG/1
650 TABLET ORAL ONCE
Status: COMPLETED | OUTPATIENT
Start: 2019-02-26 | End: 2019-02-26

## 2019-02-26 RX ORDER — MEPERIDINE HYDROCHLORIDE 50 MG/ML
12.5 INJECTION INTRAMUSCULAR; INTRAVENOUS; SUBCUTANEOUS
Status: DISCONTINUED | OUTPATIENT
Start: 2019-02-26 | End: 2019-02-26 | Stop reason: HOSPADM

## 2019-02-26 RX ADMIN — CYCLOPENTOLATE HYDROCHLORIDE 1 DROP: 20 SOLUTION/ DROPS OPHTHALMIC at 07:21

## 2019-02-26 RX ADMIN — PROPARACAINE HYDROCHLORIDE 1 DROP: 5 SOLUTION/ DROPS OPHTHALMIC at 07:21

## 2019-02-26 RX ADMIN — CYCLOPENTOLATE HYDROCHLORIDE 1 DROP: 20 SOLUTION/ DROPS OPHTHALMIC at 07:26

## 2019-02-26 RX ADMIN — ACETAMINOPHEN 650 MG: 325 TABLET, FILM COATED ORAL at 07:20

## 2019-02-26 RX ADMIN — PHENYLEPHRINE HYDROCHLORIDE 1 DROP: 25 SOLUTION/ DROPS OPHTHALMIC at 07:27

## 2019-02-26 RX ADMIN — PHENYLEPHRINE HYDROCHLORIDE 1 DROP: 25 SOLUTION/ DROPS OPHTHALMIC at 07:21

## 2019-02-26 RX ADMIN — FLURBIPROFEN SODIUM 0.5 ML: 0.3 SOLUTION/ DROPS OPHTHALMIC at 07:27

## 2019-02-26 RX ADMIN — MIDAZOLAM 1 MG: 1 INJECTION INTRAMUSCULAR; INTRAVENOUS at 08:22

## 2019-02-26 ASSESSMENT — MIFFLIN-ST. JEOR: SCORE: 1905.81

## 2019-02-26 NOTE — OR NURSING
Pateint discharged to home .  Becky score 20. Pain level 0/10.  Discharged from unit via walking .

## 2019-02-26 NOTE — ANESTHESIA POSTPROCEDURE EVALUATION
Patient: Joseph Stanford    Procedure(s):  PHACOEMULSIFICATION CATARACT EXTRACTION POSTERIOR CHAMBER LENS RIGHT    Diagnosis:COMBINED FORMS OF AGE RELATED CATARACT RIGHT EYE  Diagnosis Additional Information: No value filed.    Anesthesia Type:  MAC    Note:  Anesthesia Post Evaluation    Patient location during evaluation: Phase 2 and Bedside  Patient participation: Able to fully participate in evaluation  Level of consciousness: awake and alert  Pain management: adequate  Airway patency: patent  Cardiovascular status: acceptable  Respiratory status: acceptable  Hydration status: stable  PONV: none     Anesthetic complications: None          Last vitals:  Vitals:    02/26/19 0900 02/26/19 0905 02/26/19 0909   BP: 103/86 111/74    Pulse:      Resp:      Temp:      SpO2: 93% 97% 96%         Electronically Signed By: Juan Victor MD  February 26, 2019  9:49 AM

## 2019-02-26 NOTE — OP NOTE
[facility]  Ophthalmology Full Operative Note    Pre-operative diagnosis: COMBINED FORMS OF AGE RELATED CATARACT RIGHT EYE   Post-operative diagnosis Same   Procedure: Procedure(s):  PHACOEMULSIFICATION CATARACT EXTRACTION POSTERIOR CHAMBER LENS RIGHT   Surgeon: Shankar Cross MD   Assistants(s):    Anesthesia: Combined MAC with Topical    Estimated blood loss: None    Total IV fluids: (See anesthesia record)   Specimens: None   Implants: 17 [id number]   Findings:    Complications: None   Condition: Stable   Comments:       PROCEDURAL ANESTHESIA:     Topical/MAC with IV sedation.  Lidocaine 2% jelly topically and Lidocaine 1% preservative-free intracamerally.     PROCEDURE:  The patient was brought to the Operating Room and prepped and draped in a sterile manner.  A wire lid speculum was placed.  A paracentesis was created and 1% Lidocaine was instilled in the anterior chamber.  The anterior chamber was then filled with Amvisc viscoelastic.  A clear cornea temporal wound was created using a 2.8 mm keratome.  A cystotome was used to initiate a flap in the anterior capsule and a Utrata forceps was used to create a continuous tear capsulorhexis.  Hydrodissection was performed.  The phacoemulsification tip was inserted into the eye and the nucleus and epinucleus were removed using a divide and conquer technique.  The residual cortex was removed using the I/A handpiece.  The anterior chamber was then refilled with viscoelastic and the wound was enlarged with the keratome.  The intraocular lens, 17 diopter, Model LI61AO, was placed into the injector and injected into the capsular bag. It was checked to make sure that it was central and stable.  Residual viscoelastic was removed using the I/A.  The anterior chamber was refilled with BSS.  The wounds were hydrated with BSS and were noted to be watertight with no suture necessary.  Topical pilocarpine 1%, Betagan, and Vigamox was applied.  A hard shield was placed.      The patient tolerated the procedure well and was sent to the Recovery Room in satisfactory condition.

## 2019-02-26 NOTE — INTERVAL H&P NOTE
History and physical reviewed. Patient examined. No interval change in condition.  Shankar Cross

## 2019-02-26 NOTE — ANESTHESIA CARE TRANSFER NOTE
Patient: Joseph Stanford    Procedure(s):  PHACOEMULSIFICATION CATARACT EXTRACTION POSTERIOR CHAMBER LENS RIGHT    Diagnosis: COMBINED FORMS OF AGE RELATED CATARACT RIGHT EYE  Diagnosis Additional Information: No value filed.    Anesthesia Type:   MAC     Note:  Airway :Nasal Cannula  Patient transferred to:Phase II  Handoff Report: Identifed the Patient, Identified the Reponsible Provider, Reviewed the pertinent medical history, Discussed the surgical course, Reviewed Intra-OP anesthesia mangement and issues during anesthesia, Set expectations for post-procedure period and Allowed opportunity for questions and acknowledgement of understanding      Vitals: (Last set prior to Anesthesia Care Transfer)    CRNA VITALS  2/26/2019 0820 - 2/26/2019 0850      2/26/2019             Resp Rate (set):  8                Electronically Signed By: ARMANDO Benz CRNA  February 26, 2019  8:50 AM

## 2019-03-27 DIAGNOSIS — E78.2 MIXED HYPERLIPIDEMIA: ICD-10-CM

## 2019-03-27 DIAGNOSIS — Z82.49 FH: HEART DISEASE: ICD-10-CM

## 2019-03-27 DIAGNOSIS — H25.9 AGE-RELATED CATARACT OF BOTH EYES, UNSPECIFIED AGE-RELATED CATARACT TYPE: ICD-10-CM

## 2019-03-27 DIAGNOSIS — F17.201 TOBACCO ABUSE, IN REMISSION: ICD-10-CM

## 2019-03-27 LAB
ALBUMIN SERPL-MCNC: 4.4 G/DL (ref 3.4–5)
ALP SERPL-CCNC: 32 U/L (ref 40–150)
ALT SERPL W P-5'-P-CCNC: 36 U/L (ref 0–70)
AST SERPL W P-5'-P-CCNC: 20 U/L (ref 0–45)
BILIRUB DIRECT SERPL-MCNC: 0.2 MG/DL (ref 0–0.2)
BILIRUB SERPL-MCNC: 0.5 MG/DL (ref 0.2–1.3)
CHOLEST SERPL-MCNC: 171 MG/DL
HDLC SERPL-MCNC: 53 MG/DL
LDLC SERPL CALC-MCNC: 76 MG/DL
NONHDLC SERPL-MCNC: 118 MG/DL
PROT SERPL-MCNC: 7.6 G/DL (ref 6.8–8.8)
TRIGL SERPL-MCNC: 208 MG/DL

## 2019-03-27 PROCEDURE — 80061 LIPID PANEL: CPT | Performed by: FAMILY MEDICINE

## 2019-03-27 PROCEDURE — 80076 HEPATIC FUNCTION PANEL: CPT | Performed by: FAMILY MEDICINE

## 2019-03-27 PROCEDURE — 36415 COLL VENOUS BLD VENIPUNCTURE: CPT | Performed by: FAMILY MEDICINE

## 2019-03-27 RX ORDER — SIMVASTATIN 20 MG
20 TABLET ORAL AT BEDTIME
Qty: 90 TABLET | Refills: 3 | Status: SHIPPED | OUTPATIENT
Start: 2019-03-27 | End: 2020-01-06

## 2020-01-05 DIAGNOSIS — E78.2 MIXED HYPERLIPIDEMIA: ICD-10-CM

## 2020-01-05 DIAGNOSIS — Z82.49 FH: HEART DISEASE: ICD-10-CM

## 2020-01-05 DIAGNOSIS — N40.0 BENIGN NON-NODULAR PROSTATIC HYPERPLASIA: ICD-10-CM

## 2020-01-06 RX ORDER — DUTASTERIDE 0.5 MG/1
CAPSULE, LIQUID FILLED ORAL
Qty: 90 CAPSULE | Refills: 0 | Status: SHIPPED | OUTPATIENT
Start: 2020-01-06 | End: 2020-01-20

## 2020-01-06 RX ORDER — FENOFIBRATE 145 MG/1
TABLET, COATED ORAL
Qty: 90 TABLET | Refills: 0 | Status: SHIPPED | OUTPATIENT
Start: 2020-01-06 | End: 2020-01-20

## 2020-01-06 RX ORDER — SIMVASTATIN 20 MG
20 TABLET ORAL AT BEDTIME
Qty: 90 TABLET | Refills: 0 | Status: SHIPPED | OUTPATIENT
Start: 2020-01-06 | End: 2020-01-20

## 2020-01-06 NOTE — TELEPHONE ENCOUNTER
LOV 2/5/19    Simvastatin 3/27/19 #90 with 3  avodart 1/9/19 #90 with 3  Fenofibrate 1/9/19 #90 with 3

## 2020-01-13 NOTE — PROGRESS NOTES
3  SUBJECTIVE:   CC: Joseph Stanford is an 59 year old male who presents for preventive health visit.     Healthy Habits:    Do you get at least three servings of calcium containing foods daily (dairy, green leafy vegetables, etc.)? yes    Amount of exercise or daily activities, outside of work: 7 day(s) per week    Problems taking medications regularly No    Medication side effects: No    Have you had an eye exam in the past two years? yes    Do you see a dentist twice per year? Once a year    Do you have sleep apnea, excessive snoring or daytime drowsiness?no      Hyperlipidemia Follow-Up      Are you regularly taking any medication or supplement to lower your cholesterol?   Yes- simvistatin 20 mg and tricor 145 mg    Are you having muscle aches or other side effects that you think could be caused by your cholesterol lowering medication?  No      Today's PHQ-2 Score:   PHQ-2 ( 1999 Pfizer) 1/20/2020 2/5/2019   Q1: Little interest or pleasure in doing things 0 0   Q2: Feeling down, depressed or hopeless 0 0   PHQ-2 Score 0 0       Abuse: Current or Past(Physical, Sexual or Emotional)- No  Do you feel safe in your environment? Yes        Social History     Tobacco Use     Smoking status: Former Smoker     Packs/day: 1.00     Years: 30.00     Pack years: 30.00     Types: Cigarettes     Smokeless tobacco: Never Used   Substance Use Topics     Alcohol use: Yes     Comment: socially     If you drink alcohol do you typically have >3 drinks per day or >7 drinks per week?                       Last PSA:   PSA   Date Value Ref Range Status   01/20/2020 0.40 0 - 4 ug/L Final     Comment:     Assay Method:  Chemiluminescence using Siemens Vista analyzer       Reviewed orders with patient. Reviewed health maintenance and updated orders accordingly - Yes  Labs reviewed in EPIC    Reviewed and updated as needed this visit by clinical staff  Tobacco  Allergies         Reviewed and updated as needed this visit by Provider       "  Past Medical History:   Diagnosis Date     BPH (benign prostatic hypertrophy) 8/14/2012     Diverticulosis 12/20/2011 2003     Mixed hyperlipidemia 2/8/2011     Peyronie's disease 12/20/2011     Tobacco abuse, in remission      Tobacco use disorder 2/8/2011    Quit 8/2013      Past Surgical History:   Procedure Laterality Date     ARTHROSCOPY KNEE  1980    left     ARTHROSCOPY KNEE  2000    left     colon resection  2004     COLONOSCOPY  2003    repeat 2013     COLONOSCOPY N/A 2/5/2015    Repeat in 2025//Procedure: COLONOSCOPY;  Surgeon: Keshia Vivar MD;  Location: [facility]     COLOSTOMY  2003    Diverticulosis with perforation     PHACOEMULSIFICATION WITH STANDARD INTRAOCULAR LENS IMPLANT Right 2/26/2019    Procedure: PHACOEMULSIFICATION CATARACT EXTRACTION POSTERIOR CHAMBER LENS RIGHT;  Surgeon: Shankar Cross MD;  Location: [facility]       ROS:  CONSTITUTIONAL: NEGATIVE for fever, chills, change in weight  INTEGUMENTARY/SKIN: NEGATIVE for worrisome rashes, moles or lesions  EYES: NEGATIVE for vision changes or irritation  ENT: NEGATIVE for ear, mouth and throat problems  RESP: NEGATIVE for significant cough or SOB  CV: NEGATIVE for chest pain, palpitations or peripheral edema  GI: NEGATIVE for nausea, abdominal pain, heartburn, or change in bowel habits   male: negative for dysuria, hematuria, decreased urinary stream, erectile dysfunction, urethral discharge  MUSCULOSKELETAL: NEGATIVE for significant arthralgias or myalgia--- c/o left UE radicular sx - to shoulder blade and down arm at times. SOme numbness- no loss of strength--- Stretching helps.   NEURO: NEGATIVE for weakness, dizziness or paresthesias  PSYCHIATRIC: NEGATIVE for changes in mood or affect    OBJECTIVE:   /78 (BP Location: Left arm, Patient Position: Chair, Cuff Size: Adult Large)   Pulse 77   Resp 18   Ht 1.88 m (6' 2\")   Wt 100.2 kg (220 lb 12.8 oz)   SpO2 97%   BMI 28.35 kg/m    EXAM:  GENERAL: healthy, alert and no " distress  EYES: Eyes grossly normal to inspection, PERRL and conjunctivae and sclerae normal  HENT: ear canals and TM's normal, nose and mouth without ulcers or lesions  NECK: no adenopathy, no asymmetry, masses, or scars and thyroid normal to palpation  RESP: lungs clear to auscultation - no rales, rhonchi or wheezes  CV: regular rate and rhythm, normal S1 S2, no S3 or S4, no murmur, click or rub, no peripheral edema and peripheral pulses strong  ABDOMEN: soft, nontender, no hepatosplenomegaly, no masses and bowel sounds normal   (male): normal male genitalia without lesions or urethral discharge, no hernia  MS: no gross musculoskeletal defects noted, no edema  SKIN: no suspicious lesions or rashes  NEURO: Normal strength and tone, mentation intact and speech normal  PSYCH: mentation appears normal, affect normal/bright  LYMPH: no cervical, supraclavicular, axillary, or inguinal adenopathy    Results for orders placed or performed in visit on 01/20/20   PSA tumor marker     Status: None   Result Value Ref Range    PSA 0.40 0 - 4 ug/L   Lipid Profile     Status: None   Result Value Ref Range    Cholesterol 158 <200 mg/dL    Triglycerides 115 <150 mg/dL    HDL Cholesterol 58 >39 mg/dL    LDL Cholesterol Calculated 77 <100 mg/dL    Non HDL Cholesterol 100 <130 mg/dL   Comprehensive metabolic panel     Status: Abnormal   Result Value Ref Range    Sodium 138 133 - 144 mmol/L    Potassium 4.1 3.4 - 5.3 mmol/L    Chloride 105 94 - 109 mmol/L    Carbon Dioxide 29 20 - 32 mmol/L    Anion Gap 4 3 - 14 mmol/L    Glucose 98 70 - 99 mg/dL    Urea Nitrogen 23 7 - 30 mg/dL    Creatinine 1.12 0.66 - 1.25 mg/dL    GFR Estimate 72 >60 mL/min/[1.73_m2]    GFR Estimate If Black 83 >60 mL/min/[1.73_m2]    Calcium 9.2 8.5 - 10.1 mg/dL    Bilirubin Total 0.6 0.2 - 1.3 mg/dL    Albumin 4.2 3.4 - 5.0 g/dL    Protein Total 7.7 6.8 - 8.8 g/dL    Alkaline Phosphatase 30 (L) 40 - 150 U/L    ALT 39 0 - 70 U/L    AST 17 0 - 45 U/L   CBC with  platelets differential     Status: None   Result Value Ref Range    WBC 4.1 4.0 - 11.0 10e9/L    RBC Count 4.50 4.4 - 5.9 10e12/L    Hemoglobin 14.0 13.3 - 17.7 g/dL    Hematocrit 40.9 40.0 - 53.0 %    MCV 91 78 - 100 fl    MCH 31.1 26.5 - 33.0 pg    MCHC 34.2 31.5 - 36.5 g/dL    RDW 12.3 10.0 - 15.0 %    Platelet Count 276 150 - 450 10e9/L    Diff Method Automated Method     % Neutrophils 42.8 %    % Lymphocytes 34.4 %    % Monocytes 17.2 %    % Eosinophils 4.2 %    % Basophils 1.2 %    % Immature Granulocytes 0.2 %    Nucleated RBCs 0 0 /100    Absolute Neutrophil 1.7 1.6 - 8.3 10e9/L    Absolute Lymphocytes 1.4 0.8 - 5.3 10e9/L    Absolute Monocytes 0.7 0.0 - 1.3 10e9/L    Absolute Eosinophils 0.2 0.0 - 0.7 10e9/L    Absolute Basophils 0.1 0.0 - 0.2 10e9/L    Abs Immature Granulocytes 0.0 0 - 0.4 10e9/L    Absolute Nucleated RBC 0.0          ASSESSMENT/PLAN:   1. 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 Mixed hyperlipidemia  Stable - Continue current medications and behavioral changes.   - Comprehensive metabolic panel; Future  - Lipid Profile; Future  - simvastatin (ZOCOR) 20 MG tablet; Take 1 tablet (20 mg) by mouth At Bedtime  Dispense: 90 tablet; Refill: 3  - fenofibrate (TRICOR) 145 MG tablet; TAKE 1 TABLET DAILY BY MOUTH  Dispense: 90 tablet; Refill: 3  - OFFICE/OUTPT VISIT,EST,LEVL III    3. FH: heart disease  No h/o angina   - CBC with platelets differential; Future  - Comprehensive metabolic panel; Future  - simvastatin (ZOCOR) 20 MG tablet; Take 1 tablet (20 mg) by mouth At Bedtime  Dispense: 90 tablet; Refill: 3    4. Nocturia  Stable - on Avodart / PSA stable   - PSA tumor marker; Future    5. Tobacco abuse, in remission  Doing well.    6. Hypertrophy of prostate without urinary  "obstruction  Stable- Continue current medications and behavioral changes.   - PSA tumor marker; Future  - OFFICE/OUTPT VISIT,EST,LEVL III    7. Need for prophylactic vaccination and inoculation against influenza  Shot given   - Vaccine Administration, Initial [45177]    8. Cervical radiculopathy  Discussed.  Will try PT.   - PHYSICAL THERAPY REFERRAL; Future  - OFFICE/OUTPT VISIT,EST,LEVL III    9. DDD (degenerative disc disease), cervical  As above. Discussed behavioral changes and proper body mechanics needed to help control patient's back pain.   - PHYSICAL THERAPY REFERRAL; Future  - OFFICE/OUTPT VISIT,EST,LEVL III    10. Benign non-nodular prostatic hyperplasia  Stable   - dutasteride (AVODART) 0.5 MG capsule; Take 1 capsule (0.5 mg) by mouth daily  Dispense: 90 capsule; Refill: 3  - OFFICE/OUTPT VISIT,EST,LEVL III    COUNSELING:  Reviewed preventive health counseling, as reflected in patient instructions       Regular exercise       Healthy diet/nutrition       Colon cancer screening       Prostate cancer screening    Estimated body mass index is 28.35 kg/m  as calculated from the following:    Height as of this encounter: 1.88 m (6' 2\").    Weight as of this encounter: 100.2 kg (220 lb 12.8 oz).         reports that he has quit smoking. His smoking use included cigarettes. He has a 30.00 pack-year smoking history. He has never used smokeless tobacco.      Counseling Resources:  ATP IV Guidelines  Pooled Cohorts Equation Calculator  FRAX Risk Assessment  ICSI Preventive Guidelines  Dietary Guidelines for Americans, 2010  USDA's MyPlate  ASA Prophylaxis  Lung CA Screening    Ayo Sanchez MD  [facility] - [place]  "

## 2020-01-20 ENCOUNTER — OFFICE VISIT (OUTPATIENT)
Dept: FAMILY MEDICINE | Facility: OTHER | Age: 60
End: 2020-01-20
Attending: FAMILY MEDICINE
Payer: COMMERCIAL

## 2020-01-20 VITALS
RESPIRATION RATE: 18 BRPM | BODY MASS INDEX: 28.34 KG/M2 | HEART RATE: 77 BPM | OXYGEN SATURATION: 97 % | HEIGHT: 74 IN | DIASTOLIC BLOOD PRESSURE: 78 MMHG | SYSTOLIC BLOOD PRESSURE: 122 MMHG | WEIGHT: 220.8 LBS

## 2020-01-20 DIAGNOSIS — R35.1 NOCTURIA: ICD-10-CM

## 2020-01-20 DIAGNOSIS — Z00.00 ROUTINE GENERAL MEDICAL EXAMINATION AT A HEALTH CARE FACILITY: Primary | ICD-10-CM

## 2020-01-20 DIAGNOSIS — N40.0 HYPERTROPHY OF PROSTATE WITHOUT URINARY OBSTRUCTION: ICD-10-CM

## 2020-01-20 DIAGNOSIS — E78.2 MIXED HYPERLIPIDEMIA: ICD-10-CM

## 2020-01-20 DIAGNOSIS — M50.30 DDD (DEGENERATIVE DISC DISEASE), CERVICAL: ICD-10-CM

## 2020-01-20 DIAGNOSIS — N40.0 BENIGN NON-NODULAR PROSTATIC HYPERPLASIA: ICD-10-CM

## 2020-01-20 DIAGNOSIS — F17.201 TOBACCO ABUSE, IN REMISSION: ICD-10-CM

## 2020-01-20 DIAGNOSIS — Z82.49 FH: HEART DISEASE: ICD-10-CM

## 2020-01-20 DIAGNOSIS — Z23 NEED FOR PROPHYLACTIC VACCINATION AND INOCULATION AGAINST INFLUENZA: ICD-10-CM

## 2020-01-20 DIAGNOSIS — Z00.00 ROUTINE GENERAL MEDICAL EXAMINATION AT A HEALTH CARE FACILITY: ICD-10-CM

## 2020-01-20 DIAGNOSIS — M54.12 CERVICAL RADICULOPATHY: ICD-10-CM

## 2020-01-20 LAB
ALBUMIN SERPL-MCNC: 4.2 G/DL (ref 3.4–5)
ALP SERPL-CCNC: 30 U/L (ref 40–150)
ALT SERPL W P-5'-P-CCNC: 39 U/L (ref 0–70)
ANION GAP SERPL CALCULATED.3IONS-SCNC: 4 MMOL/L (ref 3–14)
AST SERPL W P-5'-P-CCNC: 17 U/L (ref 0–45)
BASOPHILS # BLD AUTO: 0.1 10E9/L (ref 0–0.2)
BASOPHILS NFR BLD AUTO: 1.2 %
BILIRUB SERPL-MCNC: 0.6 MG/DL (ref 0.2–1.3)
BUN SERPL-MCNC: 23 MG/DL (ref 7–30)
CALCIUM SERPL-MCNC: 9.2 MG/DL (ref 8.5–10.1)
CHLORIDE SERPL-SCNC: 105 MMOL/L (ref 94–109)
CHOLEST SERPL-MCNC: 158 MG/DL
CO2 SERPL-SCNC: 29 MMOL/L (ref 20–32)
CREAT SERPL-MCNC: 1.12 MG/DL (ref 0.66–1.25)
DIFFERENTIAL METHOD BLD: NORMAL
EOSINOPHIL # BLD AUTO: 0.2 10E9/L (ref 0–0.7)
EOSINOPHIL NFR BLD AUTO: 4.2 %
ERYTHROCYTE [DISTWIDTH] IN BLOOD BY AUTOMATED COUNT: 12.3 % (ref 10–15)
GFR SERPL CREATININE-BSD FRML MDRD: 72 ML/MIN/{1.73_M2}
GLUCOSE SERPL-MCNC: 98 MG/DL (ref 70–99)
HCT VFR BLD AUTO: 40.9 % (ref 40–53)
HDLC SERPL-MCNC: 58 MG/DL
HGB BLD-MCNC: 14 G/DL (ref 13.3–17.7)
IMM GRANULOCYTES # BLD: 0 10E9/L (ref 0–0.4)
IMM GRANULOCYTES NFR BLD: 0.2 %
LDLC SERPL CALC-MCNC: 77 MG/DL
LYMPHOCYTES # BLD AUTO: 1.4 10E9/L (ref 0.8–5.3)
LYMPHOCYTES NFR BLD AUTO: 34.4 %
MCH RBC QN AUTO: 31.1 PG (ref 26.5–33)
MCHC RBC AUTO-ENTMCNC: 34.2 G/DL (ref 31.5–36.5)
MCV RBC AUTO: 91 FL (ref 78–100)
MONOCYTES # BLD AUTO: 0.7 10E9/L (ref 0–1.3)
MONOCYTES NFR BLD AUTO: 17.2 %
NEUTROPHILS # BLD AUTO: 1.7 10E9/L (ref 1.6–8.3)
NEUTROPHILS NFR BLD AUTO: 42.8 %
NONHDLC SERPL-MCNC: 100 MG/DL
NRBC # BLD AUTO: 0 10*3/UL
NRBC BLD AUTO-RTO: 0 /100
PLATELET # BLD AUTO: 276 10E9/L (ref 150–450)
POTASSIUM SERPL-SCNC: 4.1 MMOL/L (ref 3.4–5.3)
PROT SERPL-MCNC: 7.7 G/DL (ref 6.8–8.8)
PSA SERPL-MCNC: 0.4 UG/L (ref 0–4)
RBC # BLD AUTO: 4.5 10E12/L (ref 4.4–5.9)
SODIUM SERPL-SCNC: 138 MMOL/L (ref 133–144)
TRIGL SERPL-MCNC: 115 MG/DL
WBC # BLD AUTO: 4.1 10E9/L (ref 4–11)

## 2020-01-20 PROCEDURE — 80053 COMPREHEN METABOLIC PANEL: CPT | Performed by: FAMILY MEDICINE

## 2020-01-20 PROCEDURE — 80061 LIPID PANEL: CPT | Performed by: FAMILY MEDICINE

## 2020-01-20 PROCEDURE — 85025 COMPLETE CBC W/AUTO DIFF WBC: CPT | Performed by: FAMILY MEDICINE

## 2020-01-20 PROCEDURE — 99396 PREV VISIT EST AGE 40-64: CPT | Mod: 25 | Performed by: FAMILY MEDICINE

## 2020-01-20 PROCEDURE — 36415 COLL VENOUS BLD VENIPUNCTURE: CPT | Performed by: FAMILY MEDICINE

## 2020-01-20 PROCEDURE — 84153 ASSAY OF PSA TOTAL: CPT | Performed by: FAMILY MEDICINE

## 2020-01-20 PROCEDURE — 90471 IMMUNIZATION ADMIN: CPT | Performed by: FAMILY MEDICINE

## 2020-01-20 PROCEDURE — 90682 RIV4 VACC RECOMBINANT DNA IM: CPT | Performed by: FAMILY MEDICINE

## 2020-01-20 RX ORDER — SIMVASTATIN 20 MG
20 TABLET ORAL AT BEDTIME
Qty: 90 TABLET | Refills: 3 | Status: SHIPPED | OUTPATIENT
Start: 2020-01-20 | End: 2021-01-27

## 2020-01-20 RX ORDER — DUTASTERIDE 0.5 MG/1
0.5 CAPSULE, LIQUID FILLED ORAL DAILY
Qty: 90 CAPSULE | Refills: 3 | Status: SHIPPED | OUTPATIENT
Start: 2020-01-20 | End: 2021-01-27

## 2020-01-20 RX ORDER — FENOFIBRATE 145 MG/1
TABLET, COATED ORAL
Qty: 90 TABLET | Refills: 3 | Status: SHIPPED | OUTPATIENT
Start: 2020-01-20 | End: 2021-01-27

## 2020-01-20 ASSESSMENT — PAIN SCALES - GENERAL: PAINLEVEL: NO PAIN (0)

## 2020-01-20 ASSESSMENT — MIFFLIN-ST. JEOR: SCORE: 1891.29

## 2020-01-20 NOTE — NURSING NOTE
"Chief Complaint   Patient presents with     Physical     Lipids       Initial /78 (BP Location: Left arm, Patient Position: Chair, Cuff Size: Adult Large)   Pulse 77   Resp 18   Ht 1.88 m (6' 2\")   Wt 100.2 kg (220 lb 12.8 oz)   SpO2 97%   BMI 28.35 kg/m   Estimated body mass index is 28.35 kg/m  as calculated from the following:    Height as of this encounter: 1.88 m (6' 2\").    Weight as of this encounter: 100.2 kg (220 lb 12.8 oz).  Medication Reconciliation: complete  Pamela M. Lechevalier, LPN    "

## 2020-01-20 NOTE — PATIENT INSTRUCTIONS
Status:  Final result   Visible to patient:  No (Not Released) Dx:  Nocturia; Routine general medical exa...    Ref Range & Units 9:30 AM  (1/20/20) 1yr ago  (1/9/19) 1yr ago  (3/20/18) 3yr ago  (1/18/17) 3yr ago  (1/27/16)   PSA 0 - 4 ug/L 0.40  0.47 CM 0.50 CM 1.24 CM 0.42    Comment: Assay Method:  Chemiluminescence using Siemens Vista analyzer   Resulting Agency  [facility]         Specimen Collected: 01/20/20  9:30 AM Last Resulted: 01/20/20  9:58 AM         Lab Flowsheet       Order Details       View Encounter       Lab and Collection Details       Routing       Result History         CM=Additional comments           Lipid Profile   Order: [id number]   Status:  Final result   Visible to patient:  No (Not Released) Dx:  Mixed hyperlipidemia; Routine general...    Ref Range & Units 9:30 AM  (1/20/20) 9mo ago  (3/27/19) 1yr ago  (1/9/19) 1yr ago  (3/20/18) 3yr ago  (1/18/17)   Cholesterol <200 mg/dL 158  171  189  217High      Triglycerides <150 mg/dL 115  208High   CM 254High      HDL Cholesterol >39 mg/dL 58  53  54  55  59    LDL Cholesterol Calculated <100 mg/dL 77  76 CM 109High  CM 111High  CM 109High  CM   Comment: Desirable:       <100 mg/dl   Non HDL Cholesterol <130 mg/dL 100  118  135High  CM 162High  CM 131High  CM   Resulting Agency  [facility]         Specimen Collected: 01/20/20  9:30 AM Last Resulted: 01/20/20  9:56 AM         Lab Flowsheet       Order Details       View Encounter       Lab and Collection Details       Routing       Result History         CM=Additional comments           Contains abnormal  data Comprehensive metabolic panel   Order: [id number]   Status:  Final result   Visible to patient:  No (Not Released) Dx:  Mixed hyperlipidemia; Routine general...    Ref Range & Units 9:30 AM  (1/20/20) 9mo ago  (3/27/19) 11mo ago  (2/5/19) 1yr ago  (1/9/19) 1yr ago  (3/20/18)   Sodium 133 - 144 mmol/L 138   139  140  138    Potassium 3.4 - 5.3 mmol/L 4.1   4.1  3.7  4.0    Chloride 94 - 109 mmol/L 105   106  105  103    Carbon Dioxide 20 - 32 mmol/L 29   27  29  30    Anion Gap 3 - 14 mmol/L 4   6  6  5    Glucose 70 - 99 mg/dL 98   93 CM 95 CM 97    Urea Nitrogen 7 - 30 mg/dL 23 18 20  20    Creatinine 0.66 - 1.25 mg/dL 1.12   1.05  1.11  1.06    GFR Estimate >60 mL/min/ 72   78 CM 73 CM 72 R, CM   Comment: Non  GFR Calc   Starting 12/18/2018, serum creatinine based estimated GFR (eGFR) will be   calculated using the Chronic Kidney Disease Epidemiology Collaboration   (CKD-EPI) equation.    GFR Estimate If Black >60 mL/min/ 83   >90 CM 84 CM 87 R, CM   Comment:  GFR Calc   Starting 12/18/2018, serum creatinine based estimated GFR (eGFR) will be   calculated using the Chronic Kidney Disease Epidemiology Collaboration   (CKD-EPI) equation.    Calcium 8.5 - 10.1 mg/dL 9.2   9.1  8.9  9.3    Bilirubin Total 0.2 - 1.3 mg/dL 0.6  0.5   0.5  0.4    Albumin 3.4 - 5.0 g/dL 4.2  4.4   4.2  4.3    Protein Total 6.8 - 8.8 g/dL 7.7  7.6   7.4  8.0    Alkaline Phosphatase 40 - 150 U/L 30Low   32Low    24Low   30Low     ALT 0 - 70 U/L 39  36   36  34    AST 0 - 45 U/L 17  20   17  18    Resulting Agency  [facility]         Specimen Collected: 01/20/20  9:30 AM Last Resulted: 01/20/20  9:56 AM         Lab Flowsheet       Order Details       View Encounter       Lab and Collection Details       Routing        Result History         CM=Additional comments  R=Reference range differs from displayed range           CBC with platelets differential   Order: [id number]   Status:  Final result   Visible to patient:  No (Not Released) Dx:  Routine general medical examination a...    Ref Range & Units 9:30 AM  (1/20/20) 11mo ago  (2/5/19) 1yr ago  (1/9/19) 1yr ago  (3/20/18) 3yr ago  (1/18/17)   WBC 4.0 - 11.0 10e9/L 4.1  4.9  4.4  4.9  5.9    RBC Count 4.4 - 5.9 10e12/L 4.50  4.31Low   4.38Low   4.61  4.40    Hemoglobin 13.3 - 17.7 g/dL 14.0  13.8  13.8  14.6  13.7    Hematocrit 40.0 - 53.0 % 40.9  39.9Low   39.9Low   42.3  40.0    MCV 78 - 100 fl 91  93  91  92  91    MCH 26.5 - 33.0 pg 31.1  32.0  31.5  31.7  31.1    MCHC 31.5 - 36.5 g/dL 34.2  34.6  34.6  34.5  34.3    RDW 10.0 - 15.0 % 12.3  12.3  12.1  12.4  12.5    Platelet Count 150 - 450 10e9/L 276  266  283  272  264    Diff Method  Automated Method  Automated Method  Automated Method  Automated Method  Automated Method    % Neutrophils % 42.8  53.0  39.6  50.1  52.5    % Lymphocytes % 34.4  29.4  37.7  29.8  29.9    % Monocytes % 17.2  13.8  17.2  16.0  12.8    % Eosinophils % 4.2  2.4  4.1  2.7  3.9    % Basophils % 1.2  1.0  1.4  1.0  0.7    % Immature Granulocytes % 0.2  0.4  0.0  0.4  0.2    Nucleated RBCs 0 /100 0  0  0  0  0    Absolute Neutrophil 1.6 - 8.3 10e9/L 1.7  2.6  1.7  2.4  3.1    Absolute Lymphocytes 0.8 - 5.3 10e9/L 1.4  1.5  1.6  1.5  1.8    Absolute Monocytes 0.0 - 1.3 10e9/L 0.7  0.7  0.8  0.8  0.8    Absolute Eosinophils 0.0 - 0.7 10e9/L 0.2  0.1  0.2  0.1  0.2    Absolute Basophils 0.0 - 0.2 10e9/L 0.1  0.1  0.1  0.1  0.0    Abs Immature Granulocytes 0 - 0.4 10e9/L 0.0  0.0  0.0  0.0  0.0    Absolute Nucleated RBC  0.0  0.0  0.0  0.0  0.0    Resulting Agency  [facility]         Specimen Collected: 01/20/20  9:30 AM Last Resulted: 01/20/20  9:36 AM         Lab Flowsheet       Order Details       View Encounter       Lab and Collection Details       Routing       Result History

## 2020-01-22 ENCOUNTER — TRANSFERRED RECORDS (OUTPATIENT)
Dept: HEALTH INFORMATION MANAGEMENT | Facility: CLINIC | Age: 60
End: 2020-01-22

## 2021-01-25 NOTE — PROGRESS NOTES
3  SUBJECTIVE:   CC: Joseph Stanford is an 60 year old male who presents for preventive health visit.       Patient has been advised of split billing requirements and indicates understanding: Yes  Healthy Habits:    Do you get at least three servings of calcium containing foods daily (dairy, green leafy vegetables, etc.)? yes    Amount of exercise or daily activities, outside of work: 2-3 day(s) per week    Problems taking medications regularly No    Medication side effects: No    Have you had an eye exam in the past two years? yes    Do you see a dentist twice per year? no    Do you have sleep apnea, excessive snoring or daytime drowsiness?no      Hyperlipidemia Follow-Up      Are you regularly taking any medication or supplement to lower your cholesterol?   Yes- zocor and tricor    Are you having muscle aches or other side effects that you think could be caused by your cholesterol lowering medication?  No    Pt has BPH with symptoms - overall fairly good control.     Today's PHQ-2 Score:   PHQ-2 ( 1999 Pfizer) 1/20/2020 2/5/2019   Q1: Little interest or pleasure in doing things 0 0   Q2: Feeling down, depressed or hopeless 0 0   PHQ-2 Score 0 0       Abuse: Current or Past(Physical, Sexual or Emotional)- No  Do you feel safe in your environment? Yes        Social History     Tobacco Use     Smoking status: Former Smoker     Packs/day: 1.00     Years: 30.00     Pack years: 30.00     Types: Cigarettes     Smokeless tobacco: Never Used   Substance Use Topics     Alcohol use: Yes     Comment: socially     If you drink alcohol do you typically have >3 drinks per day or >7 drinks per week? Yes - AUDIT SCORE:                           Last PSA:   PSA   Date Value Ref Range Status   01/20/2020 0.40 0 - 4 ug/L Final     Comment:     Assay Method:  Chemiluminescence using Siemens Vista analyzer       Reviewed orders with patient. Reviewed health maintenance and updated orders accordingly - Yes  Labs reviewed in  "EPIC    Reviewed and updated as needed this visit by clinical staff                 Reviewed and updated as needed this visit by Provider                Past Medical History:   Diagnosis Date     BPH (benign prostatic hypertrophy) 8/14/2012     Diverticulosis 12/20/2011 2003     Mixed hyperlipidemia 2/8/2011     Peyronie's disease 12/20/2011     Tobacco abuse, in remission      Tobacco use disorder 2/8/2011    Quit 8/2013      Past Surgical History:   Procedure Laterality Date     ARTHROSCOPY KNEE  1980    left     ARTHROSCOPY KNEE  2000    left     colon resection  2004     COLONOSCOPY  2003    repeat 2013     COLONOSCOPY N/A 2/5/2015    Repeat in 2025//Procedure: COLONOSCOPY;  Surgeon: Keshia Vivar MD;  Location: [facility]     COLOSTOMY  2003    Diverticulosis with perforation     PHACOEMULSIFICATION WITH STANDARD INTRAOCULAR LENS IMPLANT Right 2/26/2019    Procedure: PHACOEMULSIFICATION CATARACT EXTRACTION POSTERIOR CHAMBER LENS RIGHT;  Surgeon: Shankar Cross MD;  Location: [facility]       ROS:  CONSTITUTIONAL: NEGATIVE for fever, chills, change in weight  INTEGUMENTARY/SKIN: NEGATIVE for worrisome rashes, moles or lesions  EYES: NEGATIVE for vision changes or irritation  ENT: NEGATIVE for ear, mouth and throat problems  RESP: NEGATIVE for significant cough or SOB  CV: NEGATIVE for chest pain, palpitations or peripheral edema  GI: NEGATIVE for nausea, abdominal pain, heartburn, or change in bowel habits   male: negative for dysuria, hematuria, decreased urinary stream, erectile dysfunction, urethral discharge  MUSCULOSKELETAL: NEGATIVE for significant arthralgias or myalgia  NEURO: NEGATIVE for weakness, dizziness or paresthesias  PSYCHIATRIC: NEGATIVE for changes in mood or affect    OBJECTIVE:   /68   Pulse 78   Temp 97.4  F (36.3  C)   Ht 1.861 m (6' 1.25\")   Wt 98.9 kg (218 lb)   SpO2 99%   BMI 28.57 kg/m    EXAM:  GENERAL: healthy, alert and no distress  EYES: Eyes grossly normal to " inspection, PERRL and conjunctivae and sclerae normal  HENT: ear canals and TM's normal, nose and mouth without ulcers or lesions  NECK: no adenopathy, no asymmetry, masses, or scars and thyroid normal to palpation  RESP: lungs clear to auscultation - no rales, rhonchi or wheezes  CV: regular rate and rhythm, normal S1 S2, no S3 or S4, no murmur, click or rub, no peripheral edema and peripheral pulses strong  ABDOMEN: soft, nontender, no hepatosplenomegaly, no masses and bowel sounds normal   (male): normal male genitalia without lesions or urethral discharge, no hernia  MS: no gross musculoskeletal defects noted, no edema  SKIN: no suspicious lesions or rashes  NEURO: Normal strength and tone, mentation intact and speech normal  PSYCH: mentation appears normal, affect normal/bright    Results for orders placed or performed in visit on 02/02/21   PSA tumor marker     Status: None   Result Value Ref Range    PSA 0.48 0 - 4 ug/L   Lipid Profile     Status: None   Result Value Ref Range    Cholesterol 175 <200 mg/dL    Triglycerides 103 <150 mg/dL    HDL Cholesterol 58 >39 mg/dL    LDL Cholesterol Calculated 96 <100 mg/dL    Non HDL Cholesterol 117 <130 mg/dL   Comprehensive metabolic panel     Status: Abnormal   Result Value Ref Range    Sodium 140 133 - 144 mmol/L    Potassium 4.0 3.4 - 5.3 mmol/L    Chloride 106 94 - 109 mmol/L    Carbon Dioxide 30 20 - 32 mmol/L    Anion Gap 4 3 - 14 mmol/L    Glucose 95 70 - 99 mg/dL    Urea Nitrogen 19 7 - 30 mg/dL    Creatinine 1.13 0.66 - 1.25 mg/dL    GFR Estimate 70 >60 mL/min/[1.73_m2]    GFR Estimate If Black 81 >60 mL/min/[1.73_m2]    Calcium 9.5 8.5 - 10.1 mg/dL    Bilirubin Total 0.6 0.2 - 1.3 mg/dL    Albumin 4.4 3.4 - 5.0 g/dL    Protein Total 8.0 6.8 - 8.8 g/dL    Alkaline Phosphatase 32 (L) 40 - 150 U/L    ALT 36 0 - 70 U/L    AST 17 0 - 45 U/L   CBC with platelets differential     Status: None   Result Value Ref Range    WBC 4.3 4.0 - 11.0 10e9/L    RBC Count 4.73  4.4 - 5.9 10e12/L    Hemoglobin 14.7 13.3 - 17.7 g/dL    Hematocrit 43.8 40.0 - 53.0 %    MCV 93 78 - 100 fl    MCH 31.1 26.5 - 33.0 pg    MCHC 33.6 31.5 - 36.5 g/dL    RDW 12.2 10.0 - 15.0 %    Platelet Count 260 150 - 450 10e9/L    Diff Method Automated Method     % Neutrophils 43.1 %    % Lymphocytes 33.7 %    % Monocytes 15.9 %    % Eosinophils 5.9 %    % Basophils 1.2 %    % Immature Granulocytes 0.2 %    Nucleated RBCs 0 0 /100    Absolute Neutrophil 1.8 1.6 - 8.3 10e9/L    Absolute Lymphocytes 1.4 0.8 - 5.3 10e9/L    Absolute Monocytes 0.7 0.0 - 1.3 10e9/L    Absolute Eosinophils 0.3 0.0 - 0.7 10e9/L    Absolute Basophils 0.1 0.0 - 0.2 10e9/L    Abs Immature Granulocytes 0.0 0 - 0.4 10e9/L    Absolute Nucleated RBC 0.0          ASSESSMENT/PLAN:   1. Routine general medical examination at a health care facility  Overall doing real well. Retired and very active. Good health habits   Pt is mostly here for the below follow ups.   - C RIV4 (FLUBLOK) VACCINE RECOMBINANT DNA PRSRV ANTIBIO FREE, IM [[id number]]; Future  - C RIV4 (FLUBLOK) VACCINE RECOMBINANT DNA PRSRV ANTIBIO FREE, IM [[id number]]    2. Mixed hyperlipidemia  Stable on 2 meds. Continue current medications and behavioral changes. No side effects.   - CBC with platelets differential; Future  - Comprehensive metabolic panel; Future  - Lipid Profile; Future  - Lipid Profile  - Comprehensive metabolic panel  - CBC with platelets differential    3. Nocturia  Stable and PSA ok   - PSA tumor marker; Future  - PSA tumor marker    4. Tobacco abuse, in remission  Off Tobacco for about 5 yrs. Discussed low dose CT scan screening - R/B discussed. He will think about it.  - CBC with platelets differential; Future  - Comprehensive metabolic panel; Future  - Lipid Profile; Future  - Lipid Profile  - Comprehensive metabolic panel  - CBC with platelets differential    5. Immunization deficiency  Shots given   - C RIV4 (FLUBLOK) VACCINE RECOMBINANT DNA PRSRV ANTIBIO FREE, IM  "[[id number]]; Future  - C RIV4 (FLUBLOK) VACCINE RECOMBINANT DNA PRSRV ANTIBIO FREE, IM [[id number]]    6. Need for vaccination  Shot given   - SHINGRIX [[id number]]  - TD PRSERV FREE >=7 YRS ADS IM [[id number]]    7. Benign non-nodular prostatic hyperplasia  Stable on meds of Avodart       Patient has been advised of split billing requirements and indicates understanding:   COUNSELING:  Reviewed preventive health counseling, as reflected in patient instructions       Regular exercise       Healthy diet/nutrition       Colon cancer screening       Prostate cancer screening    Estimated body mass index is 28.35 kg/m  as calculated from the following:    Height as of 1/20/20: 1.88 m (6' 2\").    Weight as of 1/20/20: 100.2 kg (220 lb 12.8 oz).        He reports that he has quit smoking. His smoking use included cigarettes. He has a 30.00 pack-year smoking history. He has never used smokeless tobacco.      Counseling Resources:  ATP IV Guidelines  Pooled Cohorts Equation Calculator  FRAX Risk Assessment  ICSI Preventive Guidelines  Dietary Guidelines for Americans, 2010  USDA's MyPlate  ASA Prophylaxis  Lung CA Screening    Ayo Sanchez MD  [facility] [place]  "

## 2021-01-25 NOTE — PATIENT INSTRUCTIONS
Preventive Health Recommendations  Male Ages 50 - 64    Yearly exam:             See your health care provider every year in order to  o   Review health changes.   o   Discuss preventive care.    o   Review your medicines if your doctor has prescribed any.     Have a cholesterol test every 5 years, or more frequently if you are at risk for high cholesterol/heart disease.     Have a diabetes test (fasting glucose) every three years. If you are at risk for diabetes, you should have this test more often.     Have a colonoscopy at age 50, or have a yearly FIT test (stool test). These exams will check for colon cancer.      Talk with your health care provider about whether or not a prostate cancer screening test (PSA) is right for you.    You should be tested each year for STDs (sexually transmitted diseases), if you re at risk.     Shots: Get a flu shot each year. Get a tetanus shot every 10 years.     Nutrition:    Eat at least 5 servings of fruits and vegetables daily.     Eat whole-grain bread, whole-wheat pasta and brown rice instead of white grains and rice.     Get adequate Calcium and Vitamin D.     Lifestyle    Exercise for at least 150 minutes a week (30 minutes a day, 5 days a week). This will help you control your weight and prevent disease.     Limit alcohol to one drink per day.     No smoking.     Wear sunscreen to prevent skin cancer.     See your dentist every six months for an exam and cleaning.     See your eye doctor every 1 to 2 years.  Status: Final result Visible to patient: No (not released) Dx: Nocturia      Ref Range & Units 9:15 AM   (2/2/21) 1yr ago   (1/20/20) 2yr ago   (1/9/19) 2yr ago   (3/20/18)   PSA 0 - 4 ug/L 0.48  0.40 CM  0.47 CM  0.50 CM    Comment: Assay Method: Chemiluminescence using Siemens Vista analyzer   Resulting Agency  [facility]  "[facility]       Specimen Collected: 02/02/21 9:15 AM Last Resulted: 02/02/21 9:43 AM   Lab Flowsheet   Order Details   View Encounter   Lab and Collection Details   Routing   Result History    CM=Additional comments     Comprehensive metabolic panel  Order: [id number]   Status: Final result Visible to patient: No (not released) Dx: Mixed hyperlipidemia; Tobacco abuse, ...      Ref Range & Units 9:15 AM   (2/2/21) 1yr ago   (1/20/20) 1yr ago   (3/27/19) 1yr ago   (2/5/19)   Sodium 133 - 144 mmol/L 140  138   139    Potassium 3.4 - 5.3 mmol/L 4.0  4.1   4.1    Chloride 94 - 109 mmol/L 106  105   106    Carbon Dioxide 20 - 32 mmol/L 30  29   27    Anion Gap 3 - 14 mmol/L 4  4   6    Glucose 70 - 99 mg/dL 95  98   93 CM    Comment: Fasting specimen   Urea Nitrogen 7 - 30 mg/dL 19  23   18    Creatinine 0.66 - 1.25 mg/dL 1.13  1.12   1.05    GFR Estimate >60 mL/min/ 70  60 mL/min/\" class=\"[email or web address] [id number]\"72 CM   60 mL/min/\" class=\"[email or web address]_8 [id number]\"78 CM    Comment: Non  GFR Calc   Starting 12/18/2018, serum creatinine based estimated GFR (eGFR) will be   calculated using the Chronic Kidney Disease Epidemiology Collaboration   (CKD-EPI) equation.      GFR Estimate If Black >60 mL/min/ 81  60 mL/min/\" class=\"[email or web address]_8 [id number]\"83 CM   60 mL/min/ class=\"[email or web address]_8 [id number]\">90 CM    Comment:  GFR Calc   Starting 12/18/2018, serum creatinine based estimated GFR (eGFR) will be   calculated using the Chronic Kidney Disease Epidemiology Collaboration   (CKD-EPI) equation.      Calcium 8.5 - 10.1 mg/dL 9.5  9.2   9.1    Bilirubin Total 0.2 - 1.3 mg/dL 0.6  0.6  0.5     Albumin 3.4 - 5.0 g/dL 4.4  4.2  4.4     Protein Total 6.8 - 8.8 g/dL 8.0  7.7  7.6     Alkaline Phosphatase 40 - 150 U/L 32 30 32    ALT 0 - 70 U/L 36  39  36     AST 0 - 45 U/L 17  17  20     Resulting Agency  [facility] " "[facility]       Specimen Collected: 02/02/21 9:15 AM Last Resulted: 02/02/21 9:41 AM   Lab Flowsheet   Order Details   View Encounter   Lab and Collection Details   Routing   Result History    CM=Additional comments     Lipid Profile  Order: [id number]   Status: Final result Visible to patient: No (not released) Dx: Mixed hyperlipidemia; Tobacco abuse, ...      Ref Range & Units 9:15 AM   (2/2/21) 1yr ago   (1/20/20) 1yr ago   (3/27/19) 2yr ago   (1/9/19)   Cholesterol <200 mg/dL 175  158  171  189    Triglycerides <150 mg/dL 103  115  208CM  131 CM    Comment: Fasting specimen   HDL Cholesterol >39 mg/dL 58  39 mg/dL\" class=\"rz_110_8 [id number]\"58  39 mg/dL\" class=\"rz_110_8 [id number]\"53  39 mg/dL\" class=\"rz_110_8 [id number]\"54    LDL Cholesterol Calculated <100 mg/dL 96  77 CM  76 CM  109CM    Comment: Desirable: <100 mg/dl   Non HDL Cholesterol <130 mg/dL 117  100  118  135CM    Resulting Agency  [facility]       Specimen Collected: 02/02/21 9:15 AM Last Resulted: 02/02/21 9:41 AM   Lab Flowsheet   Order Details   View Encounter   Lab and Collection Details   Routing   Result History    CM=Additional comments     CBC with platelets differential  Order: [id number]   Status: Final result Visible to patient: No (not released) Dx: Mixed hyperlipidemia; Tobacco abuse, ...      Ref Range & Units 9:15 AM   (2/2/21) 1yr ago   (1/20/20) 1yr ago   (2/5/19) 2yr ago   (1/9/19)   WBC 4.0 - 11.0 10e9/L 4.3  4.1  4.9  4.4    RBC Count 4.4 - 5.9 10e12/L 4.73  4.50  4.31 4.38   Hemoglobin 13.3 - 17.7 g/dL 14.7  14.0  13.8  13.8    Hematocrit 40.0 - 53.0 % 43.8  40.9  39.9 39.9   MCV 78 - 100 fl 93  91  93  91    MCH 26.5 - 33.0 pg 31.1  31.1  32.0  31.5    MCHC 31.5 - 36.5 g/dL 33.6  34.2  34.6  34.6    RDW 10.0 - 15.0 % 12.2  12.3  12.3  12.1  "   Platelet Count 150 - 450 10e9/L 260  276  266  283    Diff Method  Automated Method  Automated Method  Automated Method  Automated Method    % Neutrophils % 43.1  42.8  53.0  39.6    % Lymphocytes % 33.7  34.4  29.4  37.7    % Monocytes % 15.9  17.2  13.8  17.2    % Eosinophils % 5.9  4.2  2.4  4.1    % Basophils % 1.2  1.2  1.0  1.4    % Immature Granulocytes % 0.2  0.2  0.4  0.0    Nucleated RBCs 0 /100 0  0  0  0    Absolute Neutrophil 1.6 - 8.3 10e9/L 1.8  1.7  2.6  1.7    Absolute Lymphocytes 0.8 - 5.3 10e9/L 1.4  1.4  1.5  1.6    Absolute Monocytes 0.0 - 1.3 10e9/L 0.7  0.7  0.7  0.8    Absolute Eosinophils 0.0 - 0.7 10e9/L 0.3  0.2  0.1  0.2    Absolute Basophils 0.0 - 0.2 10e9/L 0.1  0.1  0.1  0.1    Abs Immature Granulocytes 0 - 0.4 10e9/L 0.0  0.0  0.0  0.0    Absolute Nucleated RBC  0.0  0.0  0.0  0.0    Resulting Agency  [facility]       Specimen Collected: 02/02/21 9:15 AM Last Resulted: 02/02/21 9:21 AM   Lab Flowsheet   Order Details   View Encounter   Lab and Collection Details   Routing   Result History       Status of Other Orders  Completed         C RIV4 (FLUBLOK) VACCINE RECOMBINANT DNA PRSRV ANTIBIO FREE, IM [[id number]]  02/02/21   TD PRSERV FREE >=7 YRS ADS IM [[id number]]  02/02/21   SHINGRIX [[id number]]  02/02/21

## 2021-01-27 DIAGNOSIS — N40.0 BENIGN NON-NODULAR PROSTATIC HYPERPLASIA: ICD-10-CM

## 2021-01-27 DIAGNOSIS — Z82.49 FH: HEART DISEASE: ICD-10-CM

## 2021-01-27 DIAGNOSIS — E78.2 MIXED HYPERLIPIDEMIA: ICD-10-CM

## 2021-01-27 RX ORDER — FENOFIBRATE 145 MG/1
TABLET, COATED ORAL
Qty: 90 TABLET | Refills: 3 | Status: SHIPPED | OUTPATIENT
Start: 2021-01-27 | End: 2022-01-24

## 2021-01-27 RX ORDER — DUTASTERIDE 0.5 MG/1
0.5 CAPSULE, LIQUID FILLED ORAL DAILY
Qty: 90 CAPSULE | Refills: 3 | Status: SHIPPED | OUTPATIENT
Start: 2021-01-27 | End: 2022-01-24

## 2021-01-27 RX ORDER — SIMVASTATIN 20 MG
20 TABLET ORAL AT BEDTIME
Qty: 90 TABLET | Refills: 3 | Status: SHIPPED | OUTPATIENT
Start: 2021-01-27 | End: 2022-01-24

## 2021-01-27 NOTE — TELEPHONE ENCOUNTER
dutasteride (AVODART) 0.5 MG capsule     Last Written Prescription Date:  1/20/20  Last Fill Quantity: 90,   # refills: 3  Last Office Visit: 2/2/21  Future Office visit:    Next 5 appointments (look out 90 days)    Feb 02, 2021  9:45 AM  (Arrive by 9:30 AM)  PHYSICAL with Ayo Sanchez MD  [facility] ([facility] ) [address] [place] [address]  [phone]           fenofibrate (TRICOR) 145 MG tablet     Last Written Prescription Date:  1/20/20  Last Fill Quantity: 90,   # refills: 3  Last Office Visit: 2/2/21  Future Office visit:    Next 5 appointments (look out 90 days)    Feb 02, 2021  9:45 AM  (Arrive by 9:30 AM)  PHYSICAL with Ayo Sanchez MD  [facility] ([facility] ) [address]  [facility] [address]  [phone]           simvastatin (ZOCOR) 20 MG tablet      Last Written Prescription Date:  1/20/20  Last Fill Quantity: 90,   # refills: 3  Last Office Visit: 2/2/21  Future Office visit:    Next 5 appointments (look out 90 days)    Feb 02, 2021  9:45 AM  (Arrive by 9:30 AM)  PHYSICAL with Ayo Sanchez MD  [facility] ([facility] ) [address] [place] [address]  [phone]           Routing refill request to provider for review/approval

## 2021-02-02 ENCOUNTER — OFFICE VISIT (OUTPATIENT)
Dept: FAMILY MEDICINE | Facility: OTHER | Age: 61
End: 2021-02-02
Attending: FAMILY MEDICINE
Payer: COMMERCIAL

## 2021-02-02 VITALS
DIASTOLIC BLOOD PRESSURE: 68 MMHG | SYSTOLIC BLOOD PRESSURE: 122 MMHG | HEIGHT: 73 IN | TEMPERATURE: 97.4 F | BODY MASS INDEX: 28.89 KG/M2 | HEART RATE: 78 BPM | OXYGEN SATURATION: 99 % | WEIGHT: 218 LBS

## 2021-02-02 DIAGNOSIS — E78.2 MIXED HYPERLIPIDEMIA: Primary | ICD-10-CM

## 2021-02-02 DIAGNOSIS — Z28.39 IMMUNIZATION DEFICIENCY: ICD-10-CM

## 2021-02-02 DIAGNOSIS — N40.0 BENIGN NON-NODULAR PROSTATIC HYPERPLASIA: ICD-10-CM

## 2021-02-02 DIAGNOSIS — Z23 NEED FOR VACCINATION: ICD-10-CM

## 2021-02-02 DIAGNOSIS — R35.1 NOCTURIA: ICD-10-CM

## 2021-02-02 DIAGNOSIS — F17.201 TOBACCO ABUSE, IN REMISSION: ICD-10-CM

## 2021-02-02 LAB
ALBUMIN SERPL-MCNC: 4.4 G/DL (ref 3.4–5)
ALP SERPL-CCNC: 32 U/L (ref 40–150)
ALT SERPL W P-5'-P-CCNC: 36 U/L (ref 0–70)
ANION GAP SERPL CALCULATED.3IONS-SCNC: 4 MMOL/L (ref 3–14)
AST SERPL W P-5'-P-CCNC: 17 U/L (ref 0–45)
BASOPHILS # BLD AUTO: 0.1 10E9/L (ref 0–0.2)
BASOPHILS NFR BLD AUTO: 1.2 %
BILIRUB SERPL-MCNC: 0.6 MG/DL (ref 0.2–1.3)
BUN SERPL-MCNC: 19 MG/DL (ref 7–30)
CALCIUM SERPL-MCNC: 9.5 MG/DL (ref 8.5–10.1)
CHLORIDE SERPL-SCNC: 106 MMOL/L (ref 94–109)
CHOLEST SERPL-MCNC: 175 MG/DL
CO2 SERPL-SCNC: 30 MMOL/L (ref 20–32)
CREAT SERPL-MCNC: 1.13 MG/DL (ref 0.66–1.25)
DIFFERENTIAL METHOD BLD: NORMAL
EOSINOPHIL # BLD AUTO: 0.3 10E9/L (ref 0–0.7)
EOSINOPHIL NFR BLD AUTO: 5.9 %
ERYTHROCYTE [DISTWIDTH] IN BLOOD BY AUTOMATED COUNT: 12.2 % (ref 10–15)
GFR SERPL CREATININE-BSD FRML MDRD: 70 ML/MIN/{1.73_M2}
GLUCOSE SERPL-MCNC: 95 MG/DL (ref 70–99)
HCT VFR BLD AUTO: 43.8 % (ref 40–53)
HDLC SERPL-MCNC: 58 MG/DL
HGB BLD-MCNC: 14.7 G/DL (ref 13.3–17.7)
IMM GRANULOCYTES # BLD: 0 10E9/L (ref 0–0.4)
IMM GRANULOCYTES NFR BLD: 0.2 %
LDLC SERPL CALC-MCNC: 96 MG/DL
LYMPHOCYTES # BLD AUTO: 1.4 10E9/L (ref 0.8–5.3)
LYMPHOCYTES NFR BLD AUTO: 33.7 %
MCH RBC QN AUTO: 31.1 PG (ref 26.5–33)
MCHC RBC AUTO-ENTMCNC: 33.6 G/DL (ref 31.5–36.5)
MCV RBC AUTO: 93 FL (ref 78–100)
MONOCYTES # BLD AUTO: 0.7 10E9/L (ref 0–1.3)
MONOCYTES NFR BLD AUTO: 15.9 %
NEUTROPHILS # BLD AUTO: 1.8 10E9/L (ref 1.6–8.3)
NEUTROPHILS NFR BLD AUTO: 43.1 %
NONHDLC SERPL-MCNC: 117 MG/DL
NRBC # BLD AUTO: 0 10*3/UL
NRBC BLD AUTO-RTO: 0 /100
PLATELET # BLD AUTO: 260 10E9/L (ref 150–450)
POTASSIUM SERPL-SCNC: 4 MMOL/L (ref 3.4–5.3)
PROT SERPL-MCNC: 8 G/DL (ref 6.8–8.8)
PSA SERPL-MCNC: 0.48 UG/L (ref 0–4)
RBC # BLD AUTO: 4.73 10E12/L (ref 4.4–5.9)
SODIUM SERPL-SCNC: 140 MMOL/L (ref 133–144)
TRIGL SERPL-MCNC: 103 MG/DL
WBC # BLD AUTO: 4.3 10E9/L (ref 4–11)

## 2021-02-02 PROCEDURE — 90714 TD VACC NO PRESV 7 YRS+ IM: CPT | Performed by: FAMILY MEDICINE

## 2021-02-02 PROCEDURE — 90471 IMMUNIZATION ADMIN: CPT | Performed by: FAMILY MEDICINE

## 2021-02-02 PROCEDURE — 90682 RIV4 VACC RECOMBINANT DNA IM: CPT | Performed by: FAMILY MEDICINE

## 2021-02-02 PROCEDURE — 36415 COLL VENOUS BLD VENIPUNCTURE: CPT | Performed by: FAMILY MEDICINE

## 2021-02-02 PROCEDURE — 99396 PREV VISIT EST AGE 40-64: CPT | Mod: 25 | Performed by: FAMILY MEDICINE

## 2021-02-02 PROCEDURE — 90472 IMMUNIZATION ADMIN EACH ADD: CPT | Performed by: FAMILY MEDICINE

## 2021-02-02 PROCEDURE — 84153 ASSAY OF PSA TOTAL: CPT | Performed by: FAMILY MEDICINE

## 2021-02-02 PROCEDURE — 80053 COMPREHEN METABOLIC PANEL: CPT | Performed by: FAMILY MEDICINE

## 2021-02-02 PROCEDURE — 90750 HZV VACC RECOMBINANT IM: CPT | Performed by: FAMILY MEDICINE

## 2021-02-02 PROCEDURE — 80061 LIPID PANEL: CPT | Performed by: FAMILY MEDICINE

## 2021-02-02 PROCEDURE — 85025 COMPLETE CBC W/AUTO DIFF WBC: CPT | Performed by: FAMILY MEDICINE

## 2021-02-02 ASSESSMENT — MIFFLIN-ST. JEOR: SCORE: 1856.68

## 2021-02-02 ASSESSMENT — PAIN SCALES - GENERAL: PAINLEVEL: NO PAIN (0)

## 2021-02-02 NOTE — NURSING NOTE
"Chief Complaint   Patient presents with     Physical       Initial /68   Pulse 78   Temp 97.4  F (36.3  C)   Ht 1.861 m (6' 1.25\")   Wt 98.9 kg (218 lb)   SpO2 99%   BMI 28.57 kg/m   Estimated body mass index is 28.57 kg/m  as calculated from the following:    Height as of this encounter: 1.861 m (6' 1.25\").    Weight as of this encounter: 98.9 kg (218 lb).  Medication Reconciliation: complete  Price Obando LPN  "

## 2021-04-05 ENCOUNTER — OFFICE VISIT (OUTPATIENT)
Dept: FAMILY MEDICINE | Facility: OTHER | Age: 61
End: 2021-04-05
Attending: FAMILY MEDICINE
Payer: COMMERCIAL

## 2021-04-05 DIAGNOSIS — Z23 NEED FOR VACCINATION: Primary | ICD-10-CM

## 2021-04-05 PROCEDURE — 90471 IMMUNIZATION ADMIN: CPT

## 2021-04-05 PROCEDURE — 90750 HZV VACC RECOMBINANT IM: CPT

## 2021-04-20 ENCOUNTER — IMMUNIZATION (OUTPATIENT)
Dept: FAMILY MEDICINE | Facility: OTHER | Age: 61
End: 2021-04-20
Attending: FAMILY MEDICINE
Payer: COMMERCIAL

## 2021-04-20 PROCEDURE — 0001A PR COVID VAC PFIZER DIL RECON 30 MCG/0.3 ML IM: CPT

## 2021-04-20 PROCEDURE — 91300 PR COVID VAC PFIZER DIL RECON 30 MCG/0.3 ML IM: CPT

## 2021-05-07 ENCOUNTER — IMMUNIZATION (OUTPATIENT)
Dept: FAMILY MEDICINE | Facility: OTHER | Age: 61
End: 2021-05-07
Attending: FAMILY MEDICINE
Payer: COMMERCIAL

## 2021-05-07 PROCEDURE — 0002A PR COVID VAC PFIZER DIL RECON 30 MCG/0.3 ML IM: CPT

## 2021-05-07 PROCEDURE — 91300 PR COVID VAC PFIZER DIL RECON 30 MCG/0.3 ML IM: CPT

## 2021-12-23 ENCOUNTER — IMMUNIZATION (OUTPATIENT)
Dept: FAMILY MEDICINE | Facility: OTHER | Age: 61
End: 2021-12-23
Attending: FAMILY MEDICINE
Payer: COMMERCIAL

## 2021-12-23 PROCEDURE — 0004A PR COVID VAC PFIZER DIL RECON 30 MCG/0.3 ML IM: CPT

## 2021-12-23 PROCEDURE — 91300 PR COVID VAC PFIZER DIL RECON 30 MCG/0.3 ML IM: CPT

## 2022-03-10 NOTE — PROGRESS NOTES
SUBJECTIVE:   CC: Joseph Stanford is an 62 year old male who presents for preventative health visit.       Patient has been advised of split billing requirements and indicates understanding: Yes  HPI        Hyperlipidemia Follow-Up      Are you regularly taking any medication or supplement to lower your cholesterol?   Yes- simvastatin    Are you having muscle aches or other side effects that you think could be caused by your cholesterol lowering medication?  No      Today's PHQ-2 Score:   PHQ-2 (  [employer]) 3/11/2022   Q1: Little interest or pleasure in doing things 0   Q2: Feeling down, depressed or hopeless 0   PHQ-2 Score 0   PHQ-2 Total Score (12-17 Years)- Positive if 3 or more points; Administer PHQ-A if positive -   Q1: Little interest or pleasure in doing things Not at all   Q2: Feeling down, depressed or hopeless Not at all   PHQ-2 Score 0       Abuse: Current or Past(Physical, Sexual or Emotional)- No  Do you feel safe in your environment? Yes    Have you ever done Advance Care Planning? (For example, a Health Directive, POLST, or a discussion with a medical provider or your loved ones about your wishes): No, advance care planning information given to patient to review.  Patient declined advance care planning discussion at this time.    Social History     Tobacco Use     Smoking status: Former Smoker     Packs/day: 1.00     Years: 30.00     Pack years: 30.00     Types: Cigarettes     Start date: 1978     Quit date: 2008     Years since quittin.2     Smokeless tobacco: Former User     Quit date:    Substance Use Topics     Alcohol use: Yes     Comment: socially     If you drink alcohol do you typically have >3 drinks per day or >7 drinks per week? Yes      Alcohol Use 3/11/2022   Prescreen: >3 drinks/day or >7 drinks/week? Yes   Prescreen: >3 drinks/day or >7 drinks/week? -   AUDIT SCORE  9       Last PSA:   PSA   Date Value Ref Range Status   2021 0.48 0 - 4 ug/L Final      Comment:     Assay Method:  Chemiluminescence using Siemens Vista analyzer       Reviewed orders with patient. Reviewed health maintenance and updated orders accordingly - Yes  Labs reviewed in EPIC    Reviewed and updated as needed this visit by clinical staff   Tobacco  Allergies  Meds   Med Hx  Surg Hx  Fam Hx  Soc Hx        Reviewed and updated as needed this visit by Provider                 Past Medical History:   Diagnosis Date     BPH (benign prostatic hypertrophy) 8/14/2012     Diverticulosis 12/20/2011    2003     Mixed hyperlipidemia 2/8/2011     Peyronie's disease 12/20/2011     Tobacco abuse, in remission      Tobacco use disorder 2/8/2011    Quit 8/2013      Past Surgical History:   Procedure Laterality Date     ARTHROSCOPY KNEE  1980    left     ARTHROSCOPY KNEE  2000    left     colon resection  2004     COLONOSCOPY  2003    repeat 2013     COLONOSCOPY N/A 2/5/2015    Repeat in 2025//Procedure: COLONOSCOPY;  Surgeon: Keshia Vviar MD;  Location: [facility]     COLOSTOMY  2003    Diverticulosis with perforation     PHACOEMULSIFICATION WITH STANDARD INTRAOCULAR LENS IMPLANT Right 2/26/2019    Procedure: PHACOEMULSIFICATION CATARACT EXTRACTION POSTERIOR CHAMBER LENS RIGHT;  Surgeon: Shankar Cross MD;  Location: [facility]       Review of Systems  CONSTITUTIONAL: NEGATIVE for fever, chills, change in weight  INTEGUMENTARY/SKIN: NEGATIVE for worrisome rashes, moles or lesions  EYES: NEGATIVE for vision changes or irritation  ENT: NEGATIVE for ear, mouth and throat problems  RESP: NEGATIVE for significant cough or SOB  CV: NEGATIVE for chest pain, palpitations or peripheral edema  GI: NEGATIVE for nausea, abdominal pain, heartburn, or change in bowel habits   male: negative for dysuria, hematuria, decreased urinary stream, erectile dysfunction, urethral discharge  MUSCULOSKELETAL: NEGATIVE for significant arthralgias or myalgia  NEURO: NEGATIVE for weakness, dizziness or paresthesias  PSYCHIATRIC:  NEGATIVE for changes in mood or affect    OBJECTIVE:   /80 (BP Location: Left arm, Patient Position: Sitting, Cuff Size: Adult Regular)   Pulse 83   Temp 97.1  F (36.2  C) (Tympanic)   Resp 16   Wt 103.9 kg (229 lb)   SpO2 98%   BMI 30.01 kg/m      Physical Exam  GENERAL: healthy, alert and no distress  EYES: Eyes grossly normal to inspection, PERRL and conjunctivae and sclerae normal  HENT: ear canals and TM's normal, nose and mouth without ulcers or lesions  NECK: no adenopathy, no asymmetry, masses, or scars and thyroid normal to palpation  RESP: lungs clear to auscultation - no rales, rhonchi or wheezes  CV: regular rate and rhythm, normal S1 S2, no S3 or S4, no murmur, click or rub, no peripheral edema and peripheral pulses strong  ABDOMEN: soft, nontender, no hepatosplenomegaly, no masses and bowel sounds normal   (male): normal male genitalia without lesions or urethral discharge, no hernia  MS: no gross musculoskeletal defects noted, no edema  SKIN: no suspicious lesions or rashes  NEURO: Normal strength and tone, mentation intact and speech normal  PSYCH: mentation appears normal, affect normal/bright    Results for orders placed or performed in visit on 03/11/22   PSA tumor marker     Status: Normal   Result Value Ref Range    PSA Tumor Marker 0.56 0.00 - 4.00 ug/L    Narrative    Assay Method:  Chemiluminescence using Siemens   Vista analyzer.   Lipid Profile     Status: Abnormal   Result Value Ref Range    Cholesterol 166 <200 mg/dL    Triglycerides 203 (H) <150 mg/dL    Direct Measure HDL 48 >=40 mg/dL    LDL Cholesterol Calculated 77 <=100 mg/dL    Non HDL Cholesterol 118 <130 mg/dL    Patient Fasting > 8hrs? Yes     Narrative    Cholesterol  Desirable:  <200 mg/dL    Triglycerides  Normal:  Less than 150 mg/dL  Borderline High:  150-199 mg/dL  High:  200-499 mg/dL  Very High:  Greater than or equal to 500 mg/dL    Direct Measure HDL  Female:  Greater than or equal to 50 mg/dL   Male:   Greater than or equal to 40 mg/dL    LDL Cholesterol  Desirable:  <100mg/dL  Above Desirable:  100-129 mg/dL   Borderline High:  130-159 mg/dL   High:  160-189 mg/dL   Very High:  >= 190 mg/dL    Non HDL Cholesterol  Desirable:  130 mg/dL  Above Desirable:  130-159 mg/dL  Borderline High:  160-189 mg/dL  High:  190-219 mg/dL  Very High:  Greater than or equal to 220 mg/dL   Comprehensive metabolic panel     Status: Abnormal   Result Value Ref Range    Sodium 138 133 - 144 mmol/L    Potassium 3.7 3.4 - 5.3 mmol/L    Chloride 104 94 - 109 mmol/L    Carbon Dioxide (CO2) 30 20 - 32 mmol/L    Anion Gap 4 3 - 14 mmol/L    Urea Nitrogen 19 7 - 30 mg/dL    Creatinine 1.13 0.66 - 1.25 mg/dL    Calcium 9.4 8.5 - 10.1 mg/dL    Glucose 101 (H) 70 - 99 mg/dL    Alkaline Phosphatase 30 (L) 40 - 150 U/L    AST 19 0 - 45 U/L    ALT 35 0 - 70 U/L    Protein Total 7.9 6.8 - 8.8 g/dL    Albumin 4.3 3.4 - 5.0 g/dL    Bilirubin Total 0.6 0.2 - 1.3 mg/dL    GFR Estimate 74 >60 mL/min/1.73m2   CBC with platelets and differential     Status: None   Result Value Ref Range    WBC Count 4.8 4.0 - 11.0 10e3/uL    RBC Count 4.73 4.40 - 5.90 10e6/uL    Hemoglobin 14.5 13.3 - 17.7 g/dL    Hematocrit 44.1 40.0 - 53.0 %    MCV 93 78 - 100 fL    MCH 30.7 26.5 - 33.0 pg    MCHC 32.9 31.5 - 36.5 g/dL    RDW 12.2 10.0 - 15.0 %    Platelet Count 281 150 - 450 10e3/uL    % Neutrophils 47 %    % Lymphocytes 31 %    % Monocytes 16 %    % Eosinophils 4 %    % Basophils 2 %    % Immature Granulocytes 0 %    NRBCs per 100 WBC 0 <1 /100    Absolute Neutrophils 2.3 1.6 - 8.3 10e3/uL    Absolute Lymphocytes 1.5 0.8 - 5.3 10e3/uL    Absolute Monocytes 0.8 0.0 - 1.3 10e3/uL    Absolute Eosinophils 0.2 0.0 - 0.7 10e3/uL    Absolute Basophils 0.1 0.0 - 0.2 10e3/uL    Absolute Immature Granulocytes 0.0 <=0.4 10e3/uL    Absolute NRBCs 0.0 10e3/uL   CBC with Platelets & Differential     Status: None    Narrative    The following orders were created for panel order CBC  "with Platelets & Differential.  Procedure                               Abnormality         Status                     ---------                               -----------         ------                     CBC with platelets and d...[[id number]]                      Final result                 Please view results for these tests on the individual orders.         ASSESSMENT/PLAN:   (Z00.00) Annual physical exam  (primary encounter diagnosis)  Comment: overall active and doing well.   Plan: follow-up in a year     (E78.2) Mixed hyperlipidemia  Comment: stable - Continue current medications and behavioral changes.   Plan: Comprehensive metabolic panel, CBC with         Platelets & Differential, Lipid Profile            (N40.0) Benign non-nodular prostatic hyperplasia  Comment: stable psa.   Plan: PSA tumor marker            (R35.1) Nocturia  Comment: mild stable. On meds  Plan: PSA tumor marker        Continue current medications and behavioral changes.     (F17.201) Tobacco abuse, in remission  Comment: long discussion on this -R/B discussed. Refused last year   Plan: CT Chest Lung Cancer Scrn Low Dose wo        Will agree this year on getting           COUNSELING:   Reviewed preventive health counseling, as reflected in patient instructions       Regular exercise       Healthy diet/nutrition       Colorectal cancer screening       Prostate cancer screening    Estimated body mass index is 30.01 kg/m  as calculated from the following:    Height as of 2/2/21: 1.861 m (6' 1.25\").    Weight as of this encounter: 103.9 kg (229 lb).         He reports that he quit smoking about 14 years ago. His smoking use included cigarettes. He started smoking about 44 years ago. He has a 30.00 pack-year smoking history. He quit smokeless tobacco use about 7 years ago.      Counseling Resources:  ATP IV Guidelines  Pooled Cohorts Equation Calculator  FRAX Risk Assessment  ICSI Preventive Guidelines  Dietary Guidelines for Americans, " 2010  USDA's MyPlate  ASA Prophylaxis  Lung CA Screening    Ayo Sanchez MD  [facility]

## 2022-03-11 ENCOUNTER — LAB (OUTPATIENT)
Dept: LAB | Facility: OTHER | Age: 62
End: 2022-03-11
Attending: FAMILY MEDICINE
Payer: COMMERCIAL

## 2022-03-11 ENCOUNTER — OFFICE VISIT (OUTPATIENT)
Dept: FAMILY MEDICINE | Facility: OTHER | Age: 62
End: 2022-03-11
Attending: FAMILY MEDICINE
Payer: COMMERCIAL

## 2022-03-11 VITALS
TEMPERATURE: 97.1 F | SYSTOLIC BLOOD PRESSURE: 116 MMHG | HEART RATE: 83 BPM | BODY MASS INDEX: 30.01 KG/M2 | RESPIRATION RATE: 16 BRPM | DIASTOLIC BLOOD PRESSURE: 80 MMHG | OXYGEN SATURATION: 98 % | WEIGHT: 229 LBS

## 2022-03-11 DIAGNOSIS — E78.2 MIXED HYPERLIPIDEMIA: ICD-10-CM

## 2022-03-11 DIAGNOSIS — R35.1 NOCTURIA: ICD-10-CM

## 2022-03-11 DIAGNOSIS — N40.0 BENIGN NON-NODULAR PROSTATIC HYPERPLASIA: ICD-10-CM

## 2022-03-11 DIAGNOSIS — F17.201 TOBACCO ABUSE, IN REMISSION: ICD-10-CM

## 2022-03-11 DIAGNOSIS — Z00.00 ANNUAL PHYSICAL EXAM: Primary | ICD-10-CM

## 2022-03-11 LAB
ALBUMIN SERPL-MCNC: 4.3 G/DL (ref 3.4–5)
ALP SERPL-CCNC: 30 U/L (ref 40–150)
ALT SERPL W P-5'-P-CCNC: 35 U/L (ref 0–70)
ANION GAP SERPL CALCULATED.3IONS-SCNC: 4 MMOL/L (ref 3–14)
AST SERPL W P-5'-P-CCNC: 19 U/L (ref 0–45)
BASOPHILS # BLD AUTO: 0.1 10E3/UL (ref 0–0.2)
BASOPHILS NFR BLD AUTO: 2 %
BILIRUB SERPL-MCNC: 0.6 MG/DL (ref 0.2–1.3)
BUN SERPL-MCNC: 19 MG/DL (ref 7–30)
CALCIUM SERPL-MCNC: 9.4 MG/DL (ref 8.5–10.1)
CHLORIDE BLD-SCNC: 104 MMOL/L (ref 94–109)
CHOLEST SERPL-MCNC: 166 MG/DL
CO2 SERPL-SCNC: 30 MMOL/L (ref 20–32)
CREAT SERPL-MCNC: 1.13 MG/DL (ref 0.66–1.25)
EOSINOPHIL # BLD AUTO: 0.2 10E3/UL (ref 0–0.7)
EOSINOPHIL NFR BLD AUTO: 4 %
ERYTHROCYTE [DISTWIDTH] IN BLOOD BY AUTOMATED COUNT: 12.2 % (ref 10–15)
FASTING STATUS PATIENT QL REPORTED: YES
GFR SERPL CREATININE-BSD FRML MDRD: 74 ML/MIN/1.73M2
GLUCOSE BLD-MCNC: 101 MG/DL (ref 70–99)
HCT VFR BLD AUTO: 44.1 % (ref 40–53)
HDLC SERPL-MCNC: 48 MG/DL
HGB BLD-MCNC: 14.5 G/DL (ref 13.3–17.7)
IMM GRANULOCYTES # BLD: 0 10E3/UL
IMM GRANULOCYTES NFR BLD: 0 %
LDLC SERPL CALC-MCNC: 77 MG/DL
LYMPHOCYTES # BLD AUTO: 1.5 10E3/UL (ref 0.8–5.3)
LYMPHOCYTES NFR BLD AUTO: 31 %
MCH RBC QN AUTO: 30.7 PG (ref 26.5–33)
MCHC RBC AUTO-ENTMCNC: 32.9 G/DL (ref 31.5–36.5)
MCV RBC AUTO: 93 FL (ref 78–100)
MONOCYTES # BLD AUTO: 0.8 10E3/UL (ref 0–1.3)
MONOCYTES NFR BLD AUTO: 16 %
NEUTROPHILS # BLD AUTO: 2.3 10E3/UL (ref 1.6–8.3)
NEUTROPHILS NFR BLD AUTO: 47 %
NONHDLC SERPL-MCNC: 118 MG/DL
NRBC # BLD AUTO: 0 10E3/UL
NRBC BLD AUTO-RTO: 0 /100
PLATELET # BLD AUTO: 281 10E3/UL (ref 150–450)
POTASSIUM BLD-SCNC: 3.7 MMOL/L (ref 3.4–5.3)
PROT SERPL-MCNC: 7.9 G/DL (ref 6.8–8.8)
PSA SERPL-MCNC: 0.56 UG/L (ref 0–4)
RBC # BLD AUTO: 4.73 10E6/UL (ref 4.4–5.9)
SODIUM SERPL-SCNC: 138 MMOL/L (ref 133–144)
TRIGL SERPL-MCNC: 203 MG/DL
WBC # BLD AUTO: 4.8 10E3/UL (ref 4–11)

## 2022-03-11 PROCEDURE — 36415 COLL VENOUS BLD VENIPUNCTURE: CPT

## 2022-03-11 PROCEDURE — 85025 COMPLETE CBC W/AUTO DIFF WBC: CPT

## 2022-03-11 PROCEDURE — 80053 COMPREHEN METABOLIC PANEL: CPT

## 2022-03-11 PROCEDURE — 99396 PREV VISIT EST AGE 40-64: CPT | Performed by: FAMILY MEDICINE

## 2022-03-11 PROCEDURE — 84153 ASSAY OF PSA TOTAL: CPT

## 2022-03-11 PROCEDURE — 80061 LIPID PANEL: CPT

## 2022-03-11 ASSESSMENT — PAIN SCALES - GENERAL: PAINLEVEL: NO PAIN (0)

## 2022-03-11 NOTE — NURSING NOTE
"Chief Complaint   Patient presents with     Physical       Initial /80 (BP Location: Left arm, Patient Position: Sitting, Cuff Size: Adult Regular)   Pulse 83   Temp 97.1  F (36.2  C) (Tympanic)   Resp 16   Wt 103.9 kg (229 lb)   SpO2 98%   BMI 30.01 kg/m   Estimated body mass index is 30.01 kg/m  as calculated from the following:    Height as of 2/2/21: 1.861 m (6' 1.25\").    Weight as of this encounter: 103.9 kg (229 lb).  Medication Reconciliation: complete  Jose F King LPN  "

## 2022-03-11 NOTE — PATIENT INSTRUCTIONS
Results for orders placed or performed in visit on 03/11/22   PSA tumor marker     Status: Normal   Result Value Ref Range    PSA Tumor Marker 0.56 0.00 - 4.00 ug/L    Narrative    Assay Method:  Chemiluminescence using Siemens   Vista analyzer.   Lipid Profile     Status: Abnormal   Result Value Ref Range    Cholesterol 166 <200 mg/dL    Triglycerides 203 (H) <150 mg/dL    Direct Measure HDL 48 >=40 mg/dL    LDL Cholesterol Calculated 77 <=100 mg/dL    Non HDL Cholesterol 118 <130 mg/dL    Patient Fasting > 8hrs? Yes     Narrative    Cholesterol  Desirable:  <200 mg/dL    Triglycerides  Normal:  Less than 150 mg/dL  Borderline High:  150-199 mg/dL  High:  200-499 mg/dL  Very High:  Greater than or equal to 500 mg/dL    Direct Measure HDL  Female:  Greater than or equal to 50 mg/dL   Male:  Greater than or equal to 40 mg/dL    LDL Cholesterol  Desirable:  <100mg/dL  Above Desirable:  100-129 mg/dL   Borderline High:  130-159 mg/dL   High:  160-189 mg/dL   Very High:  >= 190 mg/dL    Non HDL Cholesterol  Desirable:  130 mg/dL  Above Desirable:  130-159 mg/dL  Borderline High:  160-189 mg/dL  High:  190-219 mg/dL  Very High:  Greater than or equal to 220 mg/dL   Comprehensive metabolic panel     Status: Abnormal   Result Value Ref Range    Sodium 138 133 - 144 mmol/L    Potassium 3.7 3.4 - 5.3 mmol/L    Chloride 104 94 - 109 mmol/L    Carbon Dioxide (CO2) 30 20 - 32 mmol/L    Anion Gap 4 3 - 14 mmol/L    Urea Nitrogen 19 7 - 30 mg/dL    Creatinine 1.13 0.66 - 1.25 mg/dL    Calcium 9.4 8.5 - 10.1 mg/dL    Glucose 101 (H) 70 - 99 mg/dL    Alkaline Phosphatase 30 (L) 40 - 150 U/L    AST 19 0 - 45 U/L    ALT 35 0 - 70 U/L    Protein Total 7.9 6.8 - 8.8 g/dL    Albumin 4.3 3.4 - 5.0 g/dL    Bilirubin Total 0.6 0.2 - 1.3 mg/dL    GFR Estimate 74 >60 mL/min/1.73m2   CBC with platelets and differential     Status: None   Result Value Ref Range    WBC Count 4.8 4.0 - 11.0 10e3/uL    RBC Count 4.73 4.40 - 5.90 10e6/uL     Hemoglobin 14.5 13.3 - 17.7 g/dL    Hematocrit 44.1 40.0 - 53.0 %    MCV 93 78 - 100 fL    MCH 30.7 26.5 - 33.0 pg    MCHC 32.9 31.5 - 36.5 g/dL    RDW 12.2 10.0 - 15.0 %    Platelet Count 281 150 - 450 10e3/uL    % Neutrophils 47 %    % Lymphocytes 31 %    % Monocytes 16 %    % Eosinophils 4 %    % Basophils 2 %    % Immature Granulocytes 0 %    NRBCs per 100 WBC 0 <1 /100    Absolute Neutrophils 2.3 1.6 - 8.3 10e3/uL    Absolute Lymphocytes 1.5 0.8 - 5.3 10e3/uL    Absolute Monocytes 0.8 0.0 - 1.3 10e3/uL    Absolute Eosinophils 0.2 0.0 - 0.7 10e3/uL    Absolute Basophils 0.1 0.0 - 0.2 10e3/uL    Absolute Immature Granulocytes 0.0 <=0.4 10e3/uL    Absolute NRBCs 0.0 10e3/uL   CBC with Platelets & Differential     Status: None    Narrative    The following orders were created for panel order CBC with Platelets & Differential.  Procedure                               Abnormality         Status                     ---------                               -----------         ------                     CBC with platelets and d...[[id number]]                      Final result                 Please view results for these tests on the individual orders.

## 2022-03-17 ENCOUNTER — HOSPITAL ENCOUNTER (OUTPATIENT)
Dept: CT IMAGING | Facility: HOSPITAL | Age: 62
Discharge: HOME | End: 2022-03-17
Attending: FAMILY MEDICINE | Admitting: FAMILY MEDICINE
Payer: COMMERCIAL

## 2022-03-17 DIAGNOSIS — F17.201 TOBACCO ABUSE, IN REMISSION: ICD-10-CM

## 2022-03-17 PROCEDURE — 71271 CT THORAX LUNG CANCER SCR C-: CPT

## 2022-03-21 ENCOUNTER — ALLIED HEALTH/NURSE VISIT (OUTPATIENT)
Dept: FAMILY MEDICINE | Facility: OTHER | Age: 62
End: 2022-03-21
Attending: FAMILY MEDICINE
Payer: COMMERCIAL

## 2022-03-21 DIAGNOSIS — Z23 NEED FOR PROPHYLACTIC VACCINATION AND INOCULATION AGAINST INFLUENZA: Primary | ICD-10-CM

## 2022-03-21 PROCEDURE — 90682 RIV4 VACC RECOMBINANT DNA IM: CPT

## 2022-03-21 PROCEDURE — 90471 IMMUNIZATION ADMIN: CPT

## 2022-10-10 DIAGNOSIS — E78.2 MIXED HYPERLIPIDEMIA: ICD-10-CM

## 2022-10-10 DIAGNOSIS — N40.0 BENIGN NON-NODULAR PROSTATIC HYPERPLASIA: ICD-10-CM

## 2022-10-10 DIAGNOSIS — Z82.49 FH: HEART DISEASE: ICD-10-CM

## 2022-10-10 RX ORDER — DUTASTERIDE 0.5 MG/1
0.5 CAPSULE, LIQUID FILLED ORAL DAILY
Qty: 90 CAPSULE | Refills: 2 | Status: SHIPPED | OUTPATIENT
Start: 2022-10-10 | End: 2023-02-22

## 2022-10-10 RX ORDER — SIMVASTATIN 20 MG
20 TABLET ORAL AT BEDTIME
Qty: 90 TABLET | Refills: 2 | Status: SHIPPED | OUTPATIENT
Start: 2022-10-10 | End: 2023-02-22

## 2022-10-10 RX ORDER — FENOFIBRATE 145 MG/1
TABLET, COATED ORAL
Qty: 90 TABLET | Refills: 2 | Status: SHIPPED | OUTPATIENT
Start: 2022-10-10 | End: 2023-02-22

## 2023-02-02 ENCOUNTER — TRANSFERRED RECORDS (OUTPATIENT)
Dept: HEALTH INFORMATION MANAGEMENT | Facility: CLINIC | Age: 63
End: 2023-02-02

## 2023-02-20 ENCOUNTER — ANESTHESIA EVENT (OUTPATIENT)
Dept: SURGERY | Facility: HOSPITAL | Age: 63
End: 2023-02-20
Payer: COMMERCIAL

## 2023-02-20 ASSESSMENT — LIFESTYLE VARIABLES: TOBACCO_USE: 1

## 2023-02-20 NOTE — ANESTHESIA PREPROCEDURE EVALUATION
Anesthesia Pre-Procedure Evaluation    Patient: Joseph Stanford   MRN: [id number] : 1960        Procedure : Procedure(s):  PHACOEMULSIFICATION CATARACT EXTRACTION POSTERIOR CHAMBER LENS LEFT EYE          Past Medical History:   Diagnosis Date     BPH (benign prostatic hypertrophy) 2012     Diverticulosis 2011    2003     Mixed hyperlipidemia 2011     Peyronie's disease 2011     Tobacco abuse, in remission      Tobacco use disorder 2011    Quit 2013      Past Surgical History:   Procedure Laterality Date     ARTHROSCOPY KNEE  1980    left     ARTHROSCOPY KNEE  2000    left     colon resection  2004     COLONOSCOPY      repeat      COLONOSCOPY N/A 2015    Repeat in Procedure: COLONOSCOPY;  Surgeon: Keshia Vivar MD;  Location: [facility]     COLOSTOMY  2003    Diverticulosis with perforation     PHACOEMULSIFICATION WITH STANDARD INTRAOCULAR LENS IMPLANT Right 2019    Procedure: PHACOEMULSIFICATION CATARACT EXTRACTION POSTERIOR CHAMBER LENS RIGHT;  Surgeon: Shankar Cross MD;  Location: [facility]      No Known Allergies   Social History     Tobacco Use     Smoking status: Former     Packs/day: 1.00     Years: 30.00     Pack years: 30.00     Types: Cigarettes     Start date: 1978     Quit date: 2008     Years since quitting: 15.1     Smokeless tobacco: Former     Quit date:    Substance Use Topics     Alcohol use: Yes     Comment: socially      Wt Readings from Last 1 Encounters:   22 103.9 kg (229 lb)        Anesthesia Evaluation   Pt has had prior anesthetic. Type: MAC and General.        ROS/MED HX  ENT/Pulmonary:     (+) SOFIA risk factors, snores loudly, tobacco use, Past use,     Neurologic:  - neg neurologic ROS     Cardiovascular:     (+) Dyslipidemia -----    METS/Exercise Tolerance: >4 METS    Hematologic:  - neg hematologic  ROS     Musculoskeletal: Comment: DDD, cervical radiculopathy  (+) arthritis, fracture (hx lumbar transverse  process fracture), Fracture location: Other Spine,     GI/Hepatic: Comment: Hx colostomy 2003 for diverticulosis with perforation s/p reversal      Renal/Genitourinary:     (+) BPH,     Endo:  - neg endo ROS     Psychiatric/Substance Use:     (+) alcohol abuse (> 3 drinks approx 1x per week) Recreational drug usage: Cannabis (last time a couple days ago).    Infectious Disease:  - neg infectious disease ROS     Malignancy:  - neg malignancy ROS     Other:  - neg other ROS          Physical Exam    Airway  airway exam normal      Mallampati: II   TM distance: > 3 FB   Neck ROM: full   Mouth opening: > 3 cm    Respiratory Devices and Support         Dental       (+) Completely normal teeth      Cardiovascular   cardiovascular exam normal       Rhythm and rate: regular and normal     Pulmonary   pulmonary exam normal        breath sounds clear to auscultation           OUTSIDE LABS:  CBC:   Lab Results   Component Value Date    WBC 4.8 03/11/2022    WBC 4.3 02/02/2021    HGB 14.5 03/11/2022    HGB 14.7 02/02/2021    HCT 44.1 03/11/2022    HCT 43.8 02/02/2021     03/11/2022     02/02/2021     BMP:   Lab Results   Component Value Date     03/11/2022     02/02/2021    POTASSIUM 3.7 03/11/2022    POTASSIUM 4.0 02/02/2021    CHLORIDE 104 03/11/2022    CHLORIDE 106 02/02/2021    CO2 30 03/11/2022    CO2 30 02/02/2021    BUN 19 03/11/2022    BUN 19 02/02/2021    CR 1.13 03/11/2022    CR 1.13 02/02/2021     (H) 03/11/2022    GLC 95 02/02/2021     COAGS: No results found for: PTT, INR, FIBR  POC:   Lab Results   Component Value Date     (H) 01/28/2014     HEPATIC:   Lab Results   Component Value Date    ALBUMIN 4.3 03/11/2022    PROTTOTAL 7.9 03/11/2022    ALT 35 03/11/2022    AST 19 03/11/2022    ALKPHOS 30 (L) 03/11/2022    BILITOTAL 0.6 03/11/2022     OTHER:   Lab Results   Component Value Date    CHANTEL 9.4 03/11/2022    TSH 1.91 03/23/2016       Anesthesia Plan    ASA Status:  3   NPO  Status:  NPO Appropriate    Anesthesia Type: MAC.   Induction: N/a.   Maintenance: N/A.        Consents    Anesthesia Plan(s) and associated risks, benefits, and realistic alternatives discussed. Questions answered and patient/representative(s) expressed understanding.     - Discussed: Risks, Benefits and Alternatives for BOTH SEDATION and the PROCEDURE were discussed     - Discussed with:  Patient      - Extended Intubation/Ventilatory Support Discussed: No.      - Patient is DNR/DNI Status: No    Use of blood products discussed: No .     Postoperative Care            Comments:    Other Comments: aDniel 2/22, no interval changes    Risks and benefits of MAC anesthetic discussed including dental damage, aspiration, loss of airway, conversion to general anesthetic, CV complications, MI, stroke, death. Pt wishes to proceed.             ARMANDO Rose CRNA

## 2023-02-22 ENCOUNTER — LAB (OUTPATIENT)
Dept: LAB | Facility: OTHER | Age: 63
End: 2023-02-22
Attending: FAMILY MEDICINE
Payer: COMMERCIAL

## 2023-02-22 ENCOUNTER — OFFICE VISIT (OUTPATIENT)
Dept: FAMILY MEDICINE | Facility: OTHER | Age: 63
End: 2023-02-22
Attending: FAMILY MEDICINE
Payer: COMMERCIAL

## 2023-02-22 VITALS
DIASTOLIC BLOOD PRESSURE: 78 MMHG | HEART RATE: 64 BPM | SYSTOLIC BLOOD PRESSURE: 128 MMHG | OXYGEN SATURATION: 99 % | WEIGHT: 222.6 LBS | TEMPERATURE: 97.3 F | BODY MASS INDEX: 29.5 KG/M2 | HEIGHT: 73 IN

## 2023-02-22 DIAGNOSIS — H25.9 SENILE CATARACT OF LEFT EYE, UNSPECIFIED AGE-RELATED CATARACT TYPE: ICD-10-CM

## 2023-02-22 DIAGNOSIS — Z01.810 PRE-OPERATIVE CARDIOVASCULAR EXAMINATION: Primary | ICD-10-CM

## 2023-02-22 DIAGNOSIS — E78.2 MIXED HYPERLIPIDEMIA: ICD-10-CM

## 2023-02-22 DIAGNOSIS — R35.1 NOCTURIA: ICD-10-CM

## 2023-02-22 DIAGNOSIS — N40.0 HYPERTROPHY OF PROSTATE WITHOUT URINARY OBSTRUCTION: ICD-10-CM

## 2023-02-22 DIAGNOSIS — N40.0 BENIGN NON-NODULAR PROSTATIC HYPERPLASIA: ICD-10-CM

## 2023-02-22 DIAGNOSIS — M54.12 CERVICAL RADICULOPATHY: ICD-10-CM

## 2023-02-22 DIAGNOSIS — F17.201 TOBACCO ABUSE, IN REMISSION: ICD-10-CM

## 2023-02-22 DIAGNOSIS — Z82.49 FH: HEART DISEASE: ICD-10-CM

## 2023-02-22 DIAGNOSIS — Z01.810 PRE-OPERATIVE CARDIOVASCULAR EXAMINATION: ICD-10-CM

## 2023-02-22 DIAGNOSIS — L98.9 SKIN LESION: ICD-10-CM

## 2023-02-22 PROBLEM — Z23 NEED FOR PROPHYLACTIC VACCINATION AND INOCULATION AGAINST INFLUENZA: Status: RESOLVED | Noted: 2019-01-09 | Resolved: 2023-02-22

## 2023-02-22 LAB
ALBUMIN SERPL BCG-MCNC: 4.7 G/DL (ref 3.5–5.2)
ALP SERPL-CCNC: 25 U/L (ref 40–129)
ALT SERPL W P-5'-P-CCNC: 25 U/L (ref 10–50)
ANION GAP SERPL CALCULATED.3IONS-SCNC: 11 MMOL/L (ref 7–15)
AST SERPL W P-5'-P-CCNC: 24 U/L (ref 10–50)
BASOPHILS # BLD AUTO: 0.1 10E3/UL (ref 0–0.2)
BASOPHILS NFR BLD AUTO: 1 %
BILIRUB SERPL-MCNC: 0.6 MG/DL
BUN SERPL-MCNC: 19.8 MG/DL (ref 8–23)
CALCIUM SERPL-MCNC: 9.9 MG/DL (ref 8.8–10.2)
CHLORIDE SERPL-SCNC: 101 MMOL/L (ref 98–107)
CHOLEST SERPL-MCNC: 152 MG/DL
CREAT SERPL-MCNC: 1.17 MG/DL (ref 0.67–1.17)
DEPRECATED HCO3 PLAS-SCNC: 27 MMOL/L (ref 22–29)
EOSINOPHIL # BLD AUTO: 0.2 10E3/UL (ref 0–0.7)
EOSINOPHIL NFR BLD AUTO: 5 %
ERYTHROCYTE [DISTWIDTH] IN BLOOD BY AUTOMATED COUNT: 12.3 % (ref 10–15)
GFR SERPL CREATININE-BSD FRML MDRD: 70 ML/MIN/1.73M2
GLUCOSE SERPL-MCNC: 107 MG/DL (ref 70–99)
HCT VFR BLD AUTO: 43.5 % (ref 40–53)
HDLC SERPL-MCNC: 55 MG/DL
HGB BLD-MCNC: 14.3 G/DL (ref 13.3–17.7)
IMM GRANULOCYTES # BLD: 0 10E3/UL
IMM GRANULOCYTES NFR BLD: 0 %
LDLC SERPL CALC-MCNC: 84 MG/DL
LYMPHOCYTES # BLD AUTO: 1.6 10E3/UL (ref 0.8–5.3)
LYMPHOCYTES NFR BLD AUTO: 34 %
MCH RBC QN AUTO: 30.6 PG (ref 26.5–33)
MCHC RBC AUTO-ENTMCNC: 32.9 G/DL (ref 31.5–36.5)
MCV RBC AUTO: 93 FL (ref 78–100)
MONOCYTES # BLD AUTO: 0.8 10E3/UL (ref 0–1.3)
MONOCYTES NFR BLD AUTO: 18 %
NEUTROPHILS # BLD AUTO: 2 10E3/UL (ref 1.6–8.3)
NEUTROPHILS NFR BLD AUTO: 42 %
NONHDLC SERPL-MCNC: 97 MG/DL
NRBC # BLD AUTO: 0 10E3/UL
NRBC BLD AUTO-RTO: 0 /100
PLATELET # BLD AUTO: 254 10E3/UL (ref 150–450)
POTASSIUM SERPL-SCNC: 4 MMOL/L (ref 3.4–5.3)
PROT SERPL-MCNC: 7.5 G/DL (ref 6.4–8.3)
PSA SERPL-MCNC: 0.36 NG/ML (ref 0–4.5)
RBC # BLD AUTO: 4.67 10E6/UL (ref 4.4–5.9)
SODIUM SERPL-SCNC: 139 MMOL/L (ref 136–145)
TRIGL SERPL-MCNC: 66 MG/DL
WBC # BLD AUTO: 4.7 10E3/UL (ref 4–11)

## 2023-02-22 PROCEDURE — 80061 LIPID PANEL: CPT

## 2023-02-22 PROCEDURE — 84153 ASSAY OF PSA TOTAL: CPT

## 2023-02-22 PROCEDURE — 93000 ELECTROCARDIOGRAM COMPLETE: CPT | Mod: 77 | Performed by: INTERNAL MEDICINE

## 2023-02-22 PROCEDURE — 99214 OFFICE O/P EST MOD 30 MIN: CPT | Performed by: FAMILY MEDICINE

## 2023-02-22 PROCEDURE — 80053 COMPREHEN METABOLIC PANEL: CPT

## 2023-02-22 PROCEDURE — 36415 COLL VENOUS BLD VENIPUNCTURE: CPT

## 2023-02-22 PROCEDURE — 85025 COMPLETE CBC W/AUTO DIFF WBC: CPT

## 2023-02-22 RX ORDER — MOXIFLOXACIN 5 MG/ML
SOLUTION/ DROPS OPHTHALMIC
COMMUNITY
Start: 2023-02-06 | End: 2023-03-22

## 2023-02-22 RX ORDER — FENOFIBRATE 145 MG/1
145 TABLET, COATED ORAL DAILY
Qty: 90 TABLET | Refills: 2 | Status: SHIPPED | OUTPATIENT
Start: 2023-02-22 | End: 2024-01-02

## 2023-02-22 RX ORDER — KETOROLAC TROMETHAMINE 5 MG/ML
SOLUTION OPHTHALMIC
COMMUNITY
Start: 2023-02-07 | End: 2024-01-11

## 2023-02-22 RX ORDER — SIMVASTATIN 20 MG
20 TABLET ORAL AT BEDTIME
Qty: 90 TABLET | Refills: 2 | Status: SHIPPED | OUTPATIENT
Start: 2023-02-22 | End: 2024-01-02

## 2023-02-22 RX ORDER — PREDNISOLONE ACETATE 10 MG/ML
SUSPENSION/ DROPS OPHTHALMIC
COMMUNITY
Start: 2023-02-07 | End: 2024-01-11

## 2023-02-22 RX ORDER — DUTASTERIDE 0.5 MG/1
0.5 CAPSULE, LIQUID FILLED ORAL DAILY
Qty: 90 CAPSULE | Refills: 2 | Status: SHIPPED | OUTPATIENT
Start: 2023-02-22 | End: 2024-01-02

## 2023-02-22 ASSESSMENT — PAIN SCALES - GENERAL: PAINLEVEL: NO PAIN (0)

## 2023-02-22 NOTE — H&P (VIEW-ONLY)
[facility] [place]  [address] [address]  Phone: [phone]  Primary Provider: Ruthie Unger  Pre-op Performing Provider: RUTHIE UNGER      PREOPERATIVE EVALUATION:  Today's date: 2/22/2023    Joseph Stanford is a 63 year old male who presents for a preoperative evaluation.    Surgical Information:  Surgery/Procedure: Left cataract   Surgery Location: [facility]  Surgeon: Dr. Cross   Surgery Date: 2/27/2023  Time of Surgery: TBD   Where patient plans to recover: At home with family  Fax number for surgical facility: Note does not need to be faxed, will be available electronically in Epic.    Type of Anesthesia Anticipated: to be determined    Assessment & Plan     The proposed surgical procedure is considered LOW risk.      ICD-10-CM    1. Pre-operative cardiovascular examination  Z01.810 CBC with Platelets & Differential     Comprehensive metabolic panel     Lipid Profile     EKG 12-lead complete w/read - Clinics     EKG 12-lead complete w/read - Clinics      2. Senile cataract of left eye, unspecified age-related cataract type  H25.9 CBC with Platelets & Differential     Comprehensive metabolic panel     Lipid Profile     EKG 12-lead complete w/read - Clinics     EKG 12-lead complete w/read - Clinics      3. Mixed hyperlipidemia  E78.2 CBC with Platelets & Differential     Comprehensive metabolic panel     Lipid Profile     EKG 12-lead complete w/read - Clinics     EKG 12-lead complete w/read - Clinics     fenofibrate (TRICOR) 145 MG tablet     simvastatin (ZOCOR) 20 MG tablet      4. Hypertrophy of prostate without urinary obstruction  N40.0 CBC with Platelets & Differential     Comprehensive metabolic panel     Lipid Profile     EKG 12-lead complete w/read - Clinics     EKG 12-lead complete w/read - Clinics      5. Benign non-nodular prostatic hyperplasia  N40.0 CBC with Platelets & Differential     Comprehensive metabolic panel     Lipid Profile     EKG 12-lead complete w/read  - Clinics     EKG 12-lead complete w/read - Clinics     dutasteride (AVODART) 0.5 MG capsule      6. Nocturia  R35.1 PSA tumor marker      7. Cervical radiculopathy  M54.12 PSA tumor marker      8. Tobacco abuse, in remission  F17.201 PSA tumor marker     CT Chest Lung Cancer Scrn Low Dose wo      9. FH: heart disease  Z82.49 simvastatin (ZOCOR) 20 MG tablet      10. Skin lesion  L98.9         Has right cheek and right forehead nonhealing suspicious skin lesions. Discussed - will have him return for removal       Pt due for his annual low dose CT scan            Risks and Recommendations:  The patient has the following additional risks and recommendations for perioperative complications:   - No identified additional risk factors other than previously addressed    Medication Instructions:  Patient is to take all scheduled medications on the day of surgery EXCEPT for modifications listed below:  STOP ASPIRIN AND ALL SUPPLEMENTS AT LEAST 1 WEEK BEFORE.    RECOMMENDATION:  APPROVAL GIVEN to proceed with proposed procedure, without further diagnostic evaluation.            Subjective     HPI related to upcoming procedure:   62 yO male  presents for cardiopulmonary/general clearance to undergo the above procedure.  His surgeon listed above has asked for this clearance to undergo anesthesia. Pt has had this condition for approximately a year or more .   Overall pt is of good health and has not had any perioperative complications with previous surgeries.      No problems with previous cataract surgery several years ago  Preop Questions 2/22/2023   1. Have you ever had a heart attack or stroke? No   2. Have you ever had surgery on your heart or blood vessels, such as a stent placement, a coronary artery bypass, or surgery on an artery in your head, neck, heart, or legs? No   3. Do you have chest pain with activity? No   4. Do you have a history of  heart failure? No   5. Do you currently have a cold, bronchitis or symptoms  of other infection? No   6. Do you have a cough, shortness of breath, or wheezing? No   7. Do you or anyone in your family have previous history of blood clots? No   8. Do you or does anyone in your family have a serious bleeding problem such as prolonged bleeding following surgeries or cuts? No   9. Have you ever had problems with anemia or been told to take iron pills? No   10. Have you had any abnormal blood loss such as black, tarry or bloody stools? No   11. Have you ever had a blood transfusion? No   12. Are you willing to have a blood transfusion if it is medically needed before, during, or after your surgery? Yes   13. Have you or any of your relatives ever had problems with anesthesia? No   14. Do you have sleep apnea, excessive snoring or daytime drowsiness? No   15. Do you have any artifical heart valves or other implanted medical devices like a pacemaker, defibrillator, or continuous glucose monitor? No   16. Do you have artificial joints? No   17. Are you allergic to latex? No       Health Care Directive:  Patient does not have a Health Care Directive or Living Will: Discussed advance care planning with patient; however, patient declined at this time.    Preoperative Review of :   reviewed - no record of controlled substances prescribed.      Status of Chronic Conditions:  See problem list for active medical problems.  Problems all longstanding and stable, except as noted/documented.  See ROS for pertinent symptoms related to these conditions.      Review of Systems  Constitutional, neuro, ENT, endocrine, pulmonary, cardiac, gastrointestinal, genitourinary, musculoskeletal, integument and psychiatric systems are negative, except as otherwise noted.    Patient Active Problem List    Diagnosis Date Noted     Cervical radiculopathy 01/20/2020     Priority: Medium     DDD (degenerative disc disease), cervical 01/09/2019     Priority: Medium     Nocturia 01/18/2017     Priority: Medium     Benign  non-nodular prostatic hyperplasia 01/18/2017     Priority: Medium     ACP (advance care planning) 01/18/2017     Priority: Medium     Advance Care Planning 1/18/2017: ACP Review of Chart / Resources Provided:  Reviewed chart for advance care plan.  Joseph Stanford has been provided information and resources to begin or update their advance care plan.  Added by Price Obando             Primary osteoarthritis involving multiple joints 01/27/2016     Priority: Medium     Tobacco abuse, in remission      Priority: Medium     Lumbar transverse process fracture (H) 01/28/2014     Priority: Medium     Advanced care planning/counseling discussion 08/14/2012     Priority: Medium     Hypertrophy of prostate without urinary obstruction 08/14/2012     Priority: Medium     Problem list name updated by automated process. Provider to review       Mixed hyperlipidemia 02/08/2011     Priority: Medium      Past Medical History:   Diagnosis Date     BPH (benign prostatic hypertrophy) 8/14/2012     Diverticulosis 12/20/2011    2003     Mixed hyperlipidemia 2/8/2011     Peyronie's disease 12/20/2011     Tobacco abuse, in remission      Tobacco use disorder 2/8/2011    Quit 8/2013     Past Surgical History:   Procedure Laterality Date     ARTHROSCOPY KNEE  1980    left     ARTHROSCOPY KNEE  2000    left     colon resection  2004     COLONOSCOPY  2003    repeat 2013     COLONOSCOPY N/A 2/5/2015    Repeat in 2025//Procedure: COLONOSCOPY;  Surgeon: Keshia Vivar MD;  Location: [facility]     COLOSTOMY  2003    Diverticulosis with perforation     PHACOEMULSIFICATION WITH STANDARD INTRAOCULAR LENS IMPLANT Right 2/26/2019    Procedure: PHACOEMULSIFICATION CATARACT EXTRACTION POSTERIOR CHAMBER LENS RIGHT;  Surgeon: Shankar Cross MD;  Location: [facility]     Current Outpatient Medications   Medication Sig Dispense Refill     aspirin 81 MG chewable tablet Take 1 tablet (81 mg) by mouth daily 30 tablet      dutasteride (AVODART) 0.5 MG capsule TAKE  "1 CAPSULE (0.5 MG) BY MOUTH DAILY 90 capsule 2     fenofibrate (TRICOR) 145 MG tablet TAKE 1 TABLET DAILY BY MOUTH 90 tablet 2     simvastatin (ZOCOR) 20 MG tablet TAKE 1 TABLET (20 MG) BY MOUTH AT BEDTIME 90 tablet 2     VITAMIN E NATURAL PO Take 400 Units by mouth daily         No Known Allergies     Social History     Tobacco Use     Smoking status: Former     Packs/day: 1.00     Years: 30.00     Pack years: 30.00     Types: Cigarettes     Start date: 1/1/1978     Quit date: 1/1/2008     Years since quitting: 15.1     Smokeless tobacco: Former     Quit date: 2015   Substance Use Topics     Alcohol use: Yes     Comment: socially     Family History   Problem Relation Age of Onset     Alzheimer Disease Mother      Diabetes Father         type 2     Heart Disease Father         myocardial infarction     Heart Disease Brother 61        myocardial infarction; cause of death     History   Drug Use No         Objective     /78 (BP Location: Right arm, Patient Position: Sitting, Cuff Size: Adult Regular)   Pulse 64   Temp 97.3  F (36.3  C) (Tympanic)   Ht 1.854 m (6' 1\")   Wt 101 kg (222 lb 9.6 oz)   SpO2 99%   BMI 29.37 kg/m      Physical Exam    GENERAL APPEARANCE: healthy, alert and no distress     EYES: EOMI,  PERRL     HENT: ear canals and TM's normal and nose and mouth without ulcers or lesions     NECK: no adenopathy, no asymmetry, masses, or scars and thyroid normal to palpation     RESP: lungs clear to auscultation - no rales, rhonchi or wheezes     CV: regular rates and rhythm, normal S1 S2, no S3 or S4 and no murmur, click or rub     ABDOMEN:  soft, nontender, no HSM or masses and bowel sounds normal     MS: extremities normal- no gross deformities noted, no evidence of inflammation in joints, FROM in all extremities.     SKIN: no suspicious lesions or rashes-- right forehead- 2 small side by side ulcers -total area about 1cm,  5-6 mm hyperpigmented lesion on right cheek     NEURO: Normal strength " and tone, sensory exam grossly normal, mentation intact and speech normal     PSYCH: mentation appears normal. and affect normal/bright     LYMPHATICS: No cervical adenopathy    Recent Labs   Lab Test 02/22/23  1057 03/11/22  0950   HGB 14.3 14.5    281   NA  --  138   POTASSIUM  --  3.7   CR  --  1.13        Diagnostics:  Recent Results (from the past 24 hour(s))   PSA tumor marker    Collection Time: 02/22/23 10:57 AM   Result Value Ref Range    PSA Tumor Marker 0.36 0.00 - 4.50 ng/mL   Lipid Profile    Collection Time: 02/22/23 10:57 AM   Result Value Ref Range    Cholesterol 152 <200 mg/dL    Triglycerides 66 <150 mg/dL    Direct Measure HDL 55 >=40 mg/dL    LDL Cholesterol Calculated 84 <=100 mg/dL    Non HDL Cholesterol 97 <130 mg/dL   Comprehensive metabolic panel    Collection Time: 02/22/23 10:57 AM   Result Value Ref Range    Sodium 139 136 - 145 mmol/L    Potassium 4.0 3.4 - 5.3 mmol/L    Chloride 101 98 - 107 mmol/L    Carbon Dioxide (CO2) 27 22 - 29 mmol/L    Anion Gap 11 7 - 15 mmol/L    Urea Nitrogen 19.8 8.0 - 23.0 mg/dL    Creatinine 1.17 0.67 - 1.17 mg/dL    Calcium 9.9 8.8 - 10.2 mg/dL    Glucose 107 (H) 70 - 99 mg/dL    Alkaline Phosphatase 25 (L) 40 - 129 U/L    AST 24 10 - 50 U/L    ALT 25 10 - 50 U/L    Protein Total 7.5 6.4 - 8.3 g/dL    Albumin 4.7 3.5 - 5.2 g/dL    Bilirubin Total 0.6 <=1.2 mg/dL    GFR Estimate 70 >60 mL/min/1.73m2   CBC with platelets and differential    Collection Time: 02/22/23 10:57 AM   Result Value Ref Range    WBC Count 4.7 4.0 - 11.0 10e3/uL    RBC Count 4.67 4.40 - 5.90 10e6/uL    Hemoglobin 14.3 13.3 - 17.7 g/dL    Hematocrit 43.5 40.0 - 53.0 %    MCV 93 78 - 100 fL    MCH 30.6 26.5 - 33.0 pg    MCHC 32.9 31.5 - 36.5 g/dL    RDW 12.3 10.0 - 15.0 %    Platelet Count 254 150 - 450 10e3/uL    % Neutrophils 42 %    % Lymphocytes 34 %    % Monocytes 18 %    % Eosinophils 5 %    % Basophils 1 %    % Immature Granulocytes 0 %    NRBCs per 100 WBC 0 <1 /100     Absolute Neutrophils 2.0 1.6 - 8.3 10e3/uL    Absolute Lymphocytes 1.6 0.8 - 5.3 10e3/uL    Absolute Monocytes 0.8 0.0 - 1.3 10e3/uL    Absolute Eosinophils 0.2 0.0 - 0.7 10e3/uL    Absolute Basophils 0.1 0.0 - 0.2 10e3/uL    Absolute Immature Granulocytes 0.0 <=0.4 10e3/uL    Absolute NRBCs 0.0 10e3/uL      EKG: appears normal, NSR, normal axis, normal intervals, no acute ST/T changes c/w ischemia, no LVH by voltage criteria, Right Bundle Branch Block, unchanged from previous tracings    Revised Cardiac Risk Index (RCRI):  The patient has the following serious cardiovascular risks for perioperative complications:   - No serious cardiac risks = 0 points     RCRI Interpretation: 0 points: Class I (very low risk - 0.4% complication rate)           Signed Electronically by: Ayo Sanchez MD  Copy of this evaluation report is provided to requesting physician.

## 2023-02-22 NOTE — PATIENT INSTRUCTIONS
STOP ASPIRIN AND ALL SUPPLEMENTS AT LEAST 1 WEEK BEFORE.      Results for orders placed or performed in visit on 02/22/23   PSA tumor marker     Status: Normal   Result Value Ref Range    PSA Tumor Marker 0.36 0.00 - 4.50 ng/mL    Narrative    This result is obtained using the Roche Elecsys total PSA method on the amador [id number] immunoassay analyzer. Results obtained with different assay methods or kits cannot be used interchangeably.   Lipid Profile     Status: Normal   Result Value Ref Range    Cholesterol 152 <200 mg/dL    Triglycerides 66 <150 mg/dL    Direct Measure HDL 55 >=40 mg/dL    LDL Cholesterol Calculated 84 <=100 mg/dL    Non HDL Cholesterol 97 <130 mg/dL    Narrative    Cholesterol  Desirable:  <200 mg/dL    Triglycerides  Normal:  Less than 150 mg/dL  Borderline High:  150-199 mg/dL  High:  200-499 mg/dL  Very High:  Greater than or equal to 500 mg/dL    Direct Measure HDL  Female:  Greater than or equal to 50 mg/dL   Male:  Greater than or equal to 40 mg/dL    LDL Cholesterol  Desirable:  <100mg/dL  Above Desirable:  100-129 mg/dL   Borderline High:  130-159 mg/dL   High:  160-189 mg/dL   Very High:  >= 190 mg/dL    Non HDL Cholesterol  Desirable:  130 mg/dL  Above Desirable:  130-159 mg/dL  Borderline High:  160-189 mg/dL  High:  190-219 mg/dL  Very High:  Greater than or equal to 220 mg/dL   Comprehensive metabolic panel     Status: Abnormal   Result Value Ref Range    Sodium 139 136 - 145 mmol/L    Potassium 4.0 3.4 - 5.3 mmol/L    Chloride 101 98 - 107 mmol/L    Carbon Dioxide (CO2) 27 22 - 29 mmol/L    Anion Gap 11 7 - 15 mmol/L    Urea Nitrogen 19.8 8.0 - 23.0 mg/dL    Creatinine 1.17 0.67 - 1.17 mg/dL    Calcium 9.9 8.8 - 10.2 mg/dL    Glucose 107 (H) 70 - 99 mg/dL    Alkaline Phosphatase 25 (L) 40 - 129 U/L    AST 24 10 - 50 U/L    ALT 25 10 - 50 U/L    Protein Total 7.5 6.4 - 8.3 g/dL    Albumin 4.7 3.5 - 5.2 g/dL    Bilirubin Total 0.6 <=1.2 mg/dL    GFR Estimate 70 >60 mL/min/1.73m2   CBC  with platelets and differential     Status: None   Result Value Ref Range    WBC Count 4.7 4.0 - 11.0 10e3/uL    RBC Count 4.67 4.40 - 5.90 10e6/uL    Hemoglobin 14.3 13.3 - 17.7 g/dL    Hematocrit 43.5 40.0 - 53.0 %    MCV 93 78 - 100 fL    MCH 30.6 26.5 - 33.0 pg    MCHC 32.9 31.5 - 36.5 g/dL    RDW 12.3 10.0 - 15.0 %    Platelet Count 254 150 - 450 10e3/uL    % Neutrophils 42 %    % Lymphocytes 34 %    % Monocytes 18 %    % Eosinophils 5 %    % Basophils 1 %    % Immature Granulocytes 0 %    NRBCs per 100 WBC 0 <1 /100    Absolute Neutrophils 2.0 1.6 - 8.3 10e3/uL    Absolute Lymphocytes 1.6 0.8 - 5.3 10e3/uL    Absolute Monocytes 0.8 0.0 - 1.3 10e3/uL    Absolute Eosinophils 0.2 0.0 - 0.7 10e3/uL    Absolute Basophils 0.1 0.0 - 0.2 10e3/uL    Absolute Immature Granulocytes 0.0 <=0.4 10e3/uL    Absolute NRBCs 0.0 10e3/uL   CBC with Platelets & Differential     Status: None    Narrative    The following orders were created for panel order CBC with Platelets & Differential.  Procedure                               Abnormality         Status                     ---------                               -----------         ------                     CBC with platelets and d...[[id number]]                      Final result                 Please view results for these tests on the individual orders.

## 2023-02-22 NOTE — PROGRESS NOTES
[facility] [place]  [address] [address]  Phone: [phone]  Primary Provider: Ruthie Unger  Pre-op Performing Provider: RUTHIE UNGER      PREOPERATIVE EVALUATION:  Today's date: 2/22/2023    Joseph Stanford is a 63 year old male who presents for a preoperative evaluation.    Surgical Information:  Surgery/Procedure: Left cataract   Surgery Location: [facility]  Surgeon: Dr. Cross   Surgery Date: 2/27/2023  Time of Surgery: TBD   Where patient plans to recover: At home with family  Fax number for surgical facility: Note does not need to be faxed, will be available electronically in Epic.    Type of Anesthesia Anticipated: to be determined    Assessment & Plan     The proposed surgical procedure is considered LOW risk.      ICD-10-CM    1. Pre-operative cardiovascular examination  Z01.810 CBC with Platelets & Differential     Comprehensive metabolic panel     Lipid Profile     EKG 12-lead complete w/read - Clinics     EKG 12-lead complete w/read - Clinics      2. Senile cataract of left eye, unspecified age-related cataract type  H25.9 CBC with Platelets & Differential     Comprehensive metabolic panel     Lipid Profile     EKG 12-lead complete w/read - Clinics     EKG 12-lead complete w/read - Clinics      3. Mixed hyperlipidemia  E78.2 CBC with Platelets & Differential     Comprehensive metabolic panel     Lipid Profile     EKG 12-lead complete w/read - Clinics     EKG 12-lead complete w/read - Clinics     fenofibrate (TRICOR) 145 MG tablet     simvastatin (ZOCOR) 20 MG tablet      4. Hypertrophy of prostate without urinary obstruction  N40.0 CBC with Platelets & Differential     Comprehensive metabolic panel     Lipid Profile     EKG 12-lead complete w/read - Clinics     EKG 12-lead complete w/read - Clinics      5. Benign non-nodular prostatic hyperplasia  N40.0 CBC with Platelets & Differential     Comprehensive metabolic panel     Lipid Profile     EKG 12-lead complete w/read  - Clinics     EKG 12-lead complete w/read - Clinics     dutasteride (AVODART) 0.5 MG capsule      6. Nocturia  R35.1 PSA tumor marker      7. Cervical radiculopathy  M54.12 PSA tumor marker      8. Tobacco abuse, in remission  F17.201 PSA tumor marker     CT Chest Lung Cancer Scrn Low Dose wo      9. FH: heart disease  Z82.49 simvastatin (ZOCOR) 20 MG tablet      10. Skin lesion  L98.9         Has right cheek and right forehead nonhealing suspicious skin lesions. Discussed - will have him return for removal       Pt due for his annual low dose CT scan            Risks and Recommendations:  The patient has the following additional risks and recommendations for perioperative complications:   - No identified additional risk factors other than previously addressed    Medication Instructions:  Patient is to take all scheduled medications on the day of surgery EXCEPT for modifications listed below:  STOP ASPIRIN AND ALL SUPPLEMENTS AT LEAST 1 WEEK BEFORE.    RECOMMENDATION:  APPROVAL GIVEN to proceed with proposed procedure, without further diagnostic evaluation.            Subjective     HPI related to upcoming procedure:   64 yO male  presents for cardiopulmonary/general clearance to undergo the above procedure.  His surgeon listed above has asked for this clearance to undergo anesthesia. Pt has had this condition for approximately a year or more .   Overall pt is of good health and has not had any perioperative complications with previous surgeries.      No problems with previous cataract surgery several years ago  Preop Questions 2/22/2023   1. Have you ever had a heart attack or stroke? No   2. Have you ever had surgery on your heart or blood vessels, such as a stent placement, a coronary artery bypass, or surgery on an artery in your head, neck, heart, or legs? No   3. Do you have chest pain with activity? No   4. Do you have a history of  heart failure? No   5. Do you currently have a cold, bronchitis or symptoms  of other infection? No   6. Do you have a cough, shortness of breath, or wheezing? No   7. Do you or anyone in your family have previous history of blood clots? No   8. Do you or does anyone in your family have a serious bleeding problem such as prolonged bleeding following surgeries or cuts? No   9. Have you ever had problems with anemia or been told to take iron pills? No   10. Have you had any abnormal blood loss such as black, tarry or bloody stools? No   11. Have you ever had a blood transfusion? No   12. Are you willing to have a blood transfusion if it is medically needed before, during, or after your surgery? Yes   13. Have you or any of your relatives ever had problems with anesthesia? No   14. Do you have sleep apnea, excessive snoring or daytime drowsiness? No   15. Do you have any artifical heart valves or other implanted medical devices like a pacemaker, defibrillator, or continuous glucose monitor? No   16. Do you have artificial joints? No   17. Are you allergic to latex? No       Health Care Directive:  Patient does not have a Health Care Directive or Living Will: Discussed advance care planning with patient; however, patient declined at this time.    Preoperative Review of :   reviewed - no record of controlled substances prescribed.      Status of Chronic Conditions:  See problem list for active medical problems.  Problems all longstanding and stable, except as noted/documented.  See ROS for pertinent symptoms related to these conditions.      Review of Systems  Constitutional, neuro, ENT, endocrine, pulmonary, cardiac, gastrointestinal, genitourinary, musculoskeletal, integument and psychiatric systems are negative, except as otherwise noted.    Patient Active Problem List    Diagnosis Date Noted     Cervical radiculopathy 01/20/2020     Priority: Medium     DDD (degenerative disc disease), cervical 01/09/2019     Priority: Medium     Nocturia 01/18/2017     Priority: Medium     Benign  non-nodular prostatic hyperplasia 01/18/2017     Priority: Medium     ACP (advance care planning) 01/18/2017     Priority: Medium     Advance Care Planning 1/18/2017: ACP Review of Chart / Resources Provided:  Reviewed chart for advance care plan.  Joseph Stanford has been provided information and resources to begin or update their advance care plan.  Added by Price Obando             Primary osteoarthritis involving multiple joints 01/27/2016     Priority: Medium     Tobacco abuse, in remission      Priority: Medium     Lumbar transverse process fracture (H) 01/28/2014     Priority: Medium     Advanced care planning/counseling discussion 08/14/2012     Priority: Medium     Hypertrophy of prostate without urinary obstruction 08/14/2012     Priority: Medium     Problem list name updated by automated process. Provider to review       Mixed hyperlipidemia 02/08/2011     Priority: Medium      Past Medical History:   Diagnosis Date     BPH (benign prostatic hypertrophy) 8/14/2012     Diverticulosis 12/20/2011    2003     Mixed hyperlipidemia 2/8/2011     Peyronie's disease 12/20/2011     Tobacco abuse, in remission      Tobacco use disorder 2/8/2011    Quit 8/2013     Past Surgical History:   Procedure Laterality Date     ARTHROSCOPY KNEE  1980    left     ARTHROSCOPY KNEE  2000    left     colon resection  2004     COLONOSCOPY  2003    repeat 2013     COLONOSCOPY N/A 2/5/2015    Repeat in 2025//Procedure: COLONOSCOPY;  Surgeon: Keshia Vivar MD;  Location: [facility]     COLOSTOMY  2003    Diverticulosis with perforation     PHACOEMULSIFICATION WITH STANDARD INTRAOCULAR LENS IMPLANT Right 2/26/2019    Procedure: PHACOEMULSIFICATION CATARACT EXTRACTION POSTERIOR CHAMBER LENS RIGHT;  Surgeon: Shankar Cross MD;  Location: [facility]     Current Outpatient Medications   Medication Sig Dispense Refill     aspirin 81 MG chewable tablet Take 1 tablet (81 mg) by mouth daily 30 tablet      dutasteride (AVODART) 0.5 MG capsule TAKE  "1 CAPSULE (0.5 MG) BY MOUTH DAILY 90 capsule 2     fenofibrate (TRICOR) 145 MG tablet TAKE 1 TABLET DAILY BY MOUTH 90 tablet 2     simvastatin (ZOCOR) 20 MG tablet TAKE 1 TABLET (20 MG) BY MOUTH AT BEDTIME 90 tablet 2     VITAMIN E NATURAL PO Take 400 Units by mouth daily         No Known Allergies     Social History     Tobacco Use     Smoking status: Former     Packs/day: 1.00     Years: 30.00     Pack years: 30.00     Types: Cigarettes     Start date: 1/1/1978     Quit date: 1/1/2008     Years since quitting: 15.1     Smokeless tobacco: Former     Quit date: 2015   Substance Use Topics     Alcohol use: Yes     Comment: socially     Family History   Problem Relation Age of Onset     Alzheimer Disease Mother      Diabetes Father         type 2     Heart Disease Father         myocardial infarction     Heart Disease Brother 61        myocardial infarction; cause of death     History   Drug Use No         Objective     /78 (BP Location: Right arm, Patient Position: Sitting, Cuff Size: Adult Regular)   Pulse 64   Temp 97.3  F (36.3  C) (Tympanic)   Ht 1.854 m (6' 1\")   Wt 101 kg (222 lb 9.6 oz)   SpO2 99%   BMI 29.37 kg/m      Physical Exam    GENERAL APPEARANCE: healthy, alert and no distress     EYES: EOMI,  PERRL     HENT: ear canals and TM's normal and nose and mouth without ulcers or lesions     NECK: no adenopathy, no asymmetry, masses, or scars and thyroid normal to palpation     RESP: lungs clear to auscultation - no rales, rhonchi or wheezes     CV: regular rates and rhythm, normal S1 S2, no S3 or S4 and no murmur, click or rub     ABDOMEN:  soft, nontender, no HSM or masses and bowel sounds normal     MS: extremities normal- no gross deformities noted, no evidence of inflammation in joints, FROM in all extremities.     SKIN: no suspicious lesions or rashes-- right forehead- 2 small side by side ulcers -total area about 1cm,  5-6 mm hyperpigmented lesion on right cheek     NEURO: Normal strength " and tone, sensory exam grossly normal, mentation intact and speech normal     PSYCH: mentation appears normal. and affect normal/bright     LYMPHATICS: No cervical adenopathy    Recent Labs   Lab Test 02/22/23  1057 03/11/22  0950   HGB 14.3 14.5    281   NA  --  138   POTASSIUM  --  3.7   CR  --  1.13        Diagnostics:  Recent Results (from the past 24 hour(s))   PSA tumor marker    Collection Time: 02/22/23 10:57 AM   Result Value Ref Range    PSA Tumor Marker 0.36 0.00 - 4.50 ng/mL   Lipid Profile    Collection Time: 02/22/23 10:57 AM   Result Value Ref Range    Cholesterol 152 <200 mg/dL    Triglycerides 66 <150 mg/dL    Direct Measure HDL 55 >=40 mg/dL    LDL Cholesterol Calculated 84 <=100 mg/dL    Non HDL Cholesterol 97 <130 mg/dL   Comprehensive metabolic panel    Collection Time: 02/22/23 10:57 AM   Result Value Ref Range    Sodium 139 136 - 145 mmol/L    Potassium 4.0 3.4 - 5.3 mmol/L    Chloride 101 98 - 107 mmol/L    Carbon Dioxide (CO2) 27 22 - 29 mmol/L    Anion Gap 11 7 - 15 mmol/L    Urea Nitrogen 19.8 8.0 - 23.0 mg/dL    Creatinine 1.17 0.67 - 1.17 mg/dL    Calcium 9.9 8.8 - 10.2 mg/dL    Glucose 107 (H) 70 - 99 mg/dL    Alkaline Phosphatase 25 (L) 40 - 129 U/L    AST 24 10 - 50 U/L    ALT 25 10 - 50 U/L    Protein Total 7.5 6.4 - 8.3 g/dL    Albumin 4.7 3.5 - 5.2 g/dL    Bilirubin Total 0.6 <=1.2 mg/dL    GFR Estimate 70 >60 mL/min/1.73m2   CBC with platelets and differential    Collection Time: 02/22/23 10:57 AM   Result Value Ref Range    WBC Count 4.7 4.0 - 11.0 10e3/uL    RBC Count 4.67 4.40 - 5.90 10e6/uL    Hemoglobin 14.3 13.3 - 17.7 g/dL    Hematocrit 43.5 40.0 - 53.0 %    MCV 93 78 - 100 fL    MCH 30.6 26.5 - 33.0 pg    MCHC 32.9 31.5 - 36.5 g/dL    RDW 12.3 10.0 - 15.0 %    Platelet Count 254 150 - 450 10e3/uL    % Neutrophils 42 %    % Lymphocytes 34 %    % Monocytes 18 %    % Eosinophils 5 %    % Basophils 1 %    % Immature Granulocytes 0 %    NRBCs per 100 WBC 0 <1 /100     Absolute Neutrophils 2.0 1.6 - 8.3 10e3/uL    Absolute Lymphocytes 1.6 0.8 - 5.3 10e3/uL    Absolute Monocytes 0.8 0.0 - 1.3 10e3/uL    Absolute Eosinophils 0.2 0.0 - 0.7 10e3/uL    Absolute Basophils 0.1 0.0 - 0.2 10e3/uL    Absolute Immature Granulocytes 0.0 <=0.4 10e3/uL    Absolute NRBCs 0.0 10e3/uL      EKG: appears normal, NSR, normal axis, normal intervals, no acute ST/T changes c/w ischemia, no LVH by voltage criteria, Right Bundle Branch Block, unchanged from previous tracings    Revised Cardiac Risk Index (RCRI):  The patient has the following serious cardiovascular risks for perioperative complications:   - No serious cardiac risks = 0 points     RCRI Interpretation: 0 points: Class I (very low risk - 0.4% complication rate)           Signed Electronically by: Ayo Sanchez MD  Copy of this evaluation report is provided to requesting physician.

## 2023-02-27 ENCOUNTER — ANESTHESIA (OUTPATIENT)
Dept: SURGERY | Facility: HOSPITAL | Age: 63
End: 2023-02-27
Payer: COMMERCIAL

## 2023-02-27 ENCOUNTER — HOSPITAL ENCOUNTER (OUTPATIENT)
Facility: HOSPITAL | Age: 63
Discharge: HOME | End: 2023-02-27
Attending: OPHTHALMOLOGY | Admitting: OPHTHALMOLOGY
Payer: COMMERCIAL

## 2023-02-27 VITALS
HEIGHT: 73 IN | HEART RATE: 60 BPM | BODY MASS INDEX: 29.29 KG/M2 | OXYGEN SATURATION: 98 % | WEIGHT: 221 LBS | TEMPERATURE: 97.2 F | RESPIRATION RATE: 16 BRPM | DIASTOLIC BLOOD PRESSURE: 76 MMHG | SYSTOLIC BLOOD PRESSURE: 128 MMHG

## 2023-02-27 PROCEDURE — 250N000011 HC RX IP 250 OP 636: Performed by: OPHTHALMOLOGY

## 2023-02-27 PROCEDURE — 272N000001 HC OR GENERAL SUPPLY STERILE: Performed by: OPHTHALMOLOGY

## 2023-02-27 PROCEDURE — 710N000012 HC RECOVERY PHASE 2, PER MINUTE: Performed by: OPHTHALMOLOGY

## 2023-02-27 PROCEDURE — 360N000076 HC SURGERY LEVEL 3, PER MIN: Performed by: OPHTHALMOLOGY

## 2023-02-27 PROCEDURE — 370N000017 HC ANESTHESIA TECHNICAL FEE, PER MIN: Performed by: OPHTHALMOLOGY

## 2023-02-27 PROCEDURE — 999N000141 HC STATISTIC PRE-PROCEDURE NURSING ASSESSMENT: Performed by: OPHTHALMOLOGY

## 2023-02-27 PROCEDURE — 250N000009 HC RX 250: Performed by: OPHTHALMOLOGY

## 2023-02-27 PROCEDURE — V2632 POST CHMBR INTRAOCULAR LENS: HCPCS | Performed by: OPHTHALMOLOGY

## 2023-02-27 PROCEDURE — 250N000013 HC RX MED GY IP 250 OP 250 PS 637: Performed by: OPHTHALMOLOGY

## 2023-02-27 PROCEDURE — 66984 XCAPSL CTRC RMVL W/O ECP: CPT | Performed by: NURSE ANESTHETIST, CERTIFIED REGISTERED

## 2023-02-27 PROCEDURE — 250N000011 HC RX IP 250 OP 636: Performed by: NURSE ANESTHETIST, CERTIFIED REGISTERED

## 2023-02-27 DEVICE — LENS-SOFPORT 15.0: Type: IMPLANTABLE DEVICE | Site: EYE | Status: FUNCTIONAL

## 2023-02-27 RX ORDER — OXYCODONE HYDROCHLORIDE 5 MG/1
10 TABLET ORAL EVERY 4 HOURS PRN
Status: DISCONTINUED | OUTPATIENT
Start: 2023-02-27 | End: 2023-02-27 | Stop reason: HOSPADM

## 2023-02-27 RX ORDER — OXYCODONE HYDROCHLORIDE 5 MG/1
5 TABLET ORAL EVERY 4 HOURS PRN
Status: DISCONTINUED | OUTPATIENT
Start: 2023-02-27 | End: 2023-02-27 | Stop reason: HOSPADM

## 2023-02-27 RX ORDER — ONDANSETRON 2 MG/ML
4 INJECTION INTRAMUSCULAR; INTRAVENOUS EVERY 30 MIN PRN
Status: DISCONTINUED | OUTPATIENT
Start: 2023-02-27 | End: 2023-02-27 | Stop reason: HOSPADM

## 2023-02-27 RX ORDER — HYDRALAZINE HYDROCHLORIDE 20 MG/ML
2.5-5 INJECTION INTRAMUSCULAR; INTRAVENOUS EVERY 10 MIN PRN
Status: DISCONTINUED | OUTPATIENT
Start: 2023-02-27 | End: 2023-02-27 | Stop reason: HOSPADM

## 2023-02-27 RX ORDER — NALOXONE HYDROCHLORIDE 0.4 MG/ML
0.4 INJECTION, SOLUTION INTRAMUSCULAR; INTRAVENOUS; SUBCUTANEOUS
Status: DISCONTINUED | OUTPATIENT
Start: 2023-02-27 | End: 2023-02-27 | Stop reason: HOSPADM

## 2023-02-27 RX ORDER — ONDANSETRON 4 MG/1
4 TABLET, ORALLY DISINTEGRATING ORAL EVERY 30 MIN PRN
Status: DISCONTINUED | OUTPATIENT
Start: 2023-02-27 | End: 2023-02-27 | Stop reason: HOSPADM

## 2023-02-27 RX ORDER — TETRACAINE HYDROCHLORIDE 5 MG/ML
SOLUTION OPHTHALMIC PRN
Status: DISCONTINUED | OUTPATIENT
Start: 2023-02-27 | End: 2023-02-27 | Stop reason: HOSPADM

## 2023-02-27 RX ORDER — ACETAMINOPHEN 325 MG/1
650 TABLET ORAL ONCE
Status: COMPLETED | OUTPATIENT
Start: 2023-02-27 | End: 2023-02-27

## 2023-02-27 RX ORDER — NALOXONE HYDROCHLORIDE 0.4 MG/ML
0.2 INJECTION, SOLUTION INTRAMUSCULAR; INTRAVENOUS; SUBCUTANEOUS
Status: DISCONTINUED | OUTPATIENT
Start: 2023-02-27 | End: 2023-02-27 | Stop reason: HOSPADM

## 2023-02-27 RX ORDER — PHENYLEPHRINE HYDROCHLORIDE 25 MG/ML
1 SOLUTION/ DROPS OPHTHALMIC ONCE
Status: COMPLETED | OUTPATIENT
Start: 2023-02-27 | End: 2023-02-27

## 2023-02-27 RX ORDER — LIDOCAINE HYDROCHLORIDE 10 MG/ML
INJECTION, SOLUTION EPIDURAL; INFILTRATION; INTRACAUDAL; PERINEURAL PRN
Status: DISCONTINUED | OUTPATIENT
Start: 2023-02-27 | End: 2023-02-27 | Stop reason: HOSPADM

## 2023-02-27 RX ORDER — LIDOCAINE 40 MG/G
CREAM TOPICAL
Status: DISCONTINUED | OUTPATIENT
Start: 2023-02-27 | End: 2023-02-27 | Stop reason: HOSPADM

## 2023-02-27 RX ORDER — PILOCARPINE HYDROCHLORIDE 10 MG/ML
SOLUTION/ DROPS OPHTHALMIC PRN
Status: DISCONTINUED | OUTPATIENT
Start: 2023-02-27 | End: 2023-02-27 | Stop reason: HOSPADM

## 2023-02-27 RX ORDER — PROPARACAINE HYDROCHLORIDE 5 MG/ML
1 SOLUTION/ DROPS OPHTHALMIC ONCE
Status: COMPLETED | OUTPATIENT
Start: 2023-02-27 | End: 2023-02-27

## 2023-02-27 RX ORDER — PHENYLEPHRINE HYDROCHLORIDE 100 MG/ML
1 SOLUTION/ DROPS OPHTHALMIC
Status: DISCONTINUED | OUTPATIENT
Start: 2023-02-27 | End: 2023-02-27 | Stop reason: HOSPADM

## 2023-02-27 RX ORDER — MOXIFLOXACIN 5 MG/ML
SOLUTION/ DROPS OPHTHALMIC PRN
Status: DISCONTINUED | OUTPATIENT
Start: 2023-02-27 | End: 2023-02-27 | Stop reason: HOSPADM

## 2023-02-27 RX ORDER — SODIUM CHLORIDE, SODIUM LACTATE, POTASSIUM CHLORIDE, CALCIUM CHLORIDE 600; 310; 30; 20 MG/100ML; MG/100ML; MG/100ML; MG/100ML
INJECTION, SOLUTION INTRAVENOUS CONTINUOUS
Status: DISCONTINUED | OUTPATIENT
Start: 2023-02-27 | End: 2023-02-27 | Stop reason: HOSPADM

## 2023-02-27 RX ORDER — PHENYLEPHRINE HYDROCHLORIDE 100 MG/ML
1 SOLUTION/ DROPS OPHTHALMIC ONCE
Status: COMPLETED | OUTPATIENT
Start: 2023-02-27 | End: 2023-02-27

## 2023-02-27 RX ORDER — LABETALOL 20 MG/4 ML (5 MG/ML) INTRAVENOUS SYRINGE
10
Status: DISCONTINUED | OUTPATIENT
Start: 2023-02-27 | End: 2023-02-27 | Stop reason: HOSPADM

## 2023-02-27 RX ORDER — CYCLOPENTOLATE HYDROCHLORIDE 20 MG/ML
1 SOLUTION/ DROPS OPHTHALMIC
Status: COMPLETED | OUTPATIENT
Start: 2023-02-27 | End: 2023-02-27

## 2023-02-27 RX ORDER — LEVOBUNOLOL HYDROCHLORIDE 5 MG/ML
SOLUTION/ DROPS OPHTHALMIC PRN
Status: DISCONTINUED | OUTPATIENT
Start: 2023-02-27 | End: 2023-02-27 | Stop reason: HOSPADM

## 2023-02-27 RX ADMIN — PROPARACAINE HYDROCHLORIDE 1 DROP: 5 SOLUTION/ DROPS OPHTHALMIC at 10:31

## 2023-02-27 RX ADMIN — MIDAZOLAM 2 MG: 1 INJECTION INTRAMUSCULAR; INTRAVENOUS at 11:09

## 2023-02-27 RX ADMIN — ACETAMINOPHEN 650 MG: 325 TABLET, FILM COATED ORAL at 10:24

## 2023-02-27 RX ADMIN — CYCLOPENTOLATE HYDROCHLORIDE 1 DROP: 20 SOLUTION/ DROPS OPHTHALMIC at 10:33

## 2023-02-27 RX ADMIN — CYCLOPENTOLATE HYDROCHLORIDE 1 DROP: 20 SOLUTION/ DROPS OPHTHALMIC at 10:38

## 2023-02-27 RX ADMIN — PHENYLEPHRINE HYDROCHLORIDE 1 DROP: 100 SOLUTION/ DROPS OPHTHALMIC at 10:34

## 2023-02-27 RX ADMIN — FLURBIPROFEN SODIUM 0.5 ML: 0.3 SOLUTION/ DROPS OPHTHALMIC at 10:39

## 2023-02-27 RX ADMIN — PHENYLEPHRINE HYDROCHLORIDE 1 DROP: 25 SOLUTION/ DROPS OPHTHALMIC at 10:39

## 2023-02-27 ASSESSMENT — ACTIVITIES OF DAILY LIVING (ADL): ADLS_ACUITY_SCORE: 37

## 2023-02-27 NOTE — ANESTHESIA POSTPROCEDURE EVALUATION
Patient: Joseph Stanford    Procedure: Procedure(s):  PHACOEMULSIFICATION CATARACT EXTRACTION POSTERIOR CHAMBER LENS LEFT EYE       Anesthesia Type:  MAC    Note:  Disposition: Outpatient   Postop Pain Control: Uneventful            Sign Out: Well controlled pain   PONV: No   Neuro/Psych: Uneventful            Sign Out: Acceptable/Baseline neuro status   Airway/Respiratory: Uneventful            Sign Out: Acceptable/Baseline resp. status   CV/Hemodynamics: Uneventful            Sign Out: Acceptable CV status; No obvious hypovolemia; No obvious fluid overload   Other NRE: NONE   DID A NON-ROUTINE EVENT OCCUR? No           Last vitals:  Vitals Value Taken Time   /76 02/27/23 1145   Temp     Pulse 60 02/27/23 1145   Resp 16 02/27/23 1145   SpO2 98 % 02/27/23 1150   Vitals shown include unvalidated device data.    Electronically Signed By: ARMANDO Benz CRNA  February 27, 2023  12:12 PM

## 2023-02-27 NOTE — OP NOTE
[facility]  Ophthalmology Full Operative Note    Pre-operative diagnosis: Combined form of age-related cataract, left eye [H25.812]   Post-operative diagnosis Same   Procedure: Procedure(s):  PHACOEMULSIFICATION CATARACT EXTRACTION POSTERIOR CHAMBER LENS LEFT EYE   Surgeon: Shankar Cross MD   Assistants(s):    Anesthesia: MAC with Local    Estimated blood loss: None    Total IV fluids: (See anesthesia record)   Specimens: None   Implants: 15 LI61AO   Findings:    Complications: None   Condition: Stable   Comments:       PROCEDURAL ANESTHESIA:     Topical/MAC.  Lidocaine 2% jelly topically and Lidocaine 1% preservative-free intracamerally.     PROCEDURE:  The patient was brought to the Operating Room and prepped and draped in a sterile manner.  A wire lid speculum was placed.  A paracentesis was created and 1% Lidocaine was instilled in the anterior chamber.  The anterior chamber was then filled with Amvisc viscoelastic.  A clear cornea temporal wound was created using a 2.8 mm keratome.  A cystotome was used to initiate a flap in the anterior capsule and a Utrata forceps was used to create a continuous tear capsulorhexis.  Hydrodissection was performed.  The phacoemulsification tip was inserted into the eye and the nucleus and epinucleus were removed using a divide and conquer technique.  The residual cortex was removed using the I/A handpiece.  The anterior chamber was then refilled with viscoelastic and the wound was enlarged with the keratome.  The intraocular lens, 15 diopter, Model LI61AO, was placed into the injector and injected into the capsular bag. It was checked to make sure that it was central and stable.  Residual viscoelastic was removed using the I/A.  The anterior chamber was refilled with BSS.  The wounds were hydrated with BSS and were noted to be watertight with no suture necessary.  Topical pilocarpine 1%, Betagan, and Vigamox was applied.  A hard shield was placed.     The patient  tolerated the procedure well and was sent to the Recovery Room in satisfactory condition.

## 2023-02-27 NOTE — ANESTHESIA CARE TRANSFER NOTE
Patient: Joseph Stanford    Procedure: Procedure(s):  PHACOEMULSIFICATION CATARACT EXTRACTION POSTERIOR CHAMBER LENS LEFT EYE       Diagnosis: Combined form of age-related cataract, left eye [H25.812]  Diagnosis Additional Information: No value filed.    Anesthesia Type:   MAC     Note:    Oropharynx: spontaneously breathing  Level of Consciousness: awake  Oxygen Supplementation: room air    Independent Airway: airway patency satisfactory and stable  Dentition: dentition unchanged  Vital Signs Stable: post-procedure vital signs reviewed and stable  Report to RN Given: handoff report given  Patient transferred to: Phase II    Handoff Report: Identifed the Patient, Identified the Reponsible Provider, Reviewed the pertinent medical history, Discussed the surgical course, Reviewed Intra-OP anesthesia mangement and issues during anesthesia, Set expectations for post-procedure period and Allowed opportunity for questions and acknowledgement of understanding      Vitals:  Vitals Value Taken Time   BP     Temp     Pulse     Resp     SpO2         Electronically Signed By: ARMANDO Henry CRNA  February 27, 2023  11:29 AM

## 2023-02-27 NOTE — INTERVAL H&P NOTE
I have reviewed the surgical (or preoperative) H&P that is linked to this encounter, and examined the patient. There are no significant changes.  Shankar Cross MD      Clinical Conditions Present on Arrival:  Clinically Significant Risk Factors Present on Admission

## 2023-02-27 NOTE — INTERVAL H&P NOTE
"I have reviewed the surgical (or preoperative) H&P that is linked to this encounter, and examined the patient. There are no significant changes    Clinical Conditions Present on Arrival:  Clinically Significant Risk Factors Present on Admission                    # Overweight: Estimated body mass index is 29.16 kg/m  as calculated from the following:    Height as of this encounter: 1.854 m (6' 1\").    Weight as of this encounter: 100.2 kg (221 lb).       "

## 2023-02-27 NOTE — OR NURSING
Patient and responsible adult given discharge instructions with no questions regarding instructions. Becky score 20/20. Pain level 0/10.  Discharged from unit via ambulation. Patient discharged to home with spouse.

## 2023-03-15 ENCOUNTER — OFFICE VISIT (OUTPATIENT)
Dept: FAMILY MEDICINE | Facility: OTHER | Age: 63
End: 2023-03-15
Attending: FAMILY MEDICINE
Payer: COMMERCIAL

## 2023-03-15 VITALS
HEIGHT: 73 IN | RESPIRATION RATE: 18 BRPM | OXYGEN SATURATION: 97 % | WEIGHT: 222.6 LBS | DIASTOLIC BLOOD PRESSURE: 91 MMHG | BODY MASS INDEX: 29.5 KG/M2 | SYSTOLIC BLOOD PRESSURE: 142 MMHG | HEART RATE: 70 BPM | TEMPERATURE: 98 F

## 2023-03-15 DIAGNOSIS — L98.9 SKIN LESION: Primary | ICD-10-CM

## 2023-03-15 PROCEDURE — 11441 EXC FACE-MM B9+MARG 0.6-1 CM: CPT | Mod: 59 | Performed by: FAMILY MEDICINE

## 2023-03-15 PROCEDURE — 88305 TISSUE EXAM BY PATHOLOGIST: CPT | Mod: TC,91 | Performed by: FAMILY MEDICINE

## 2023-03-15 PROCEDURE — 11442 EXC FACE-MM B9+MARG 1.1-2 CM: CPT | Performed by: FAMILY MEDICINE

## 2023-03-15 PROCEDURE — 88305 TISSUE EXAM BY PATHOLOGIST: CPT | Mod: 26 | Performed by: PATHOLOGY

## 2023-03-15 ASSESSMENT — PAIN SCALES - GENERAL: PAINLEVEL: NO PAIN (0)

## 2023-03-15 NOTE — PROGRESS NOTES
"  Assessment & Plan     Skin lesion  Times 2 -- see procedure note  - Surgical Pathology Exam  - Surgical Pathology Exam  - [place] SURGICAL TRAY - MINOR             BMI:   Estimated body mass index is 29.37 kg/m  as calculated from the following:    Height as of this encounter: 1.854 m (6' 1\").    Weight as of this encounter: 101 kg (222 lb 9.6 oz).           No follow-ups on file.    Ayo Sanchez MD  [facility] [place]    Subjective   Joseph is a 63 year old, presenting for the following health issues:  Lesion      HPI     Skin Lesion  Onset/Duration: year   Description  Location: cheek and forehead   Color:  red  Border description: red  Character: blotchy  Itching: mild  Bleeding:  No  Intensity:  moderate  Progression of Symptoms:  same  Accompanying signs and symptoms:   Bleeding: No  Scaling: No  Excessive sun exposure/tanning: No  Sunscreen used: No- not as much as he should  History:           Any previous history of skin cancer: No  Any family history of melanoma: No  Previous episodes of similar lesion: No  Precipitating or alleviating factors: uses alovera oil for itching  Therapies tried and outcome: alovera oil         Review of Systems   Constitutional, HEENT, cardiovascular, pulmonary, gi and gu systems are negative, except as otherwise noted.      Objective    BP (!) 142/91 (BP Location: Left arm, Patient Position: Sitting, Cuff Size: Adult Regular)   Pulse 70   Temp 98  F (36.7  C) (Temporal)   Resp 18   Ht 1.854 m (6' 1\")   Wt 101 kg (222 lb 9.6 oz)   SpO2 97%   BMI 29.37 kg/m    Body mass index is 29.37 kg/m .  Physical Exam   GENERAL: healthy, alert and no distress  SKIN: forehead  5mm  By 10mm ///   Right Check 4-5mm                  Subjective: Joseph Stanford a 63 year old male who presents today for lesion removal. The lesion is located on the face and forehead,  and measures see above under exam .  He denies other significant symptoms on ROS. Medications " reviewed.    Objective: The area was prepped and appropriately anesthetized. Using the usual technique, full thickness elliptical excision in total of both lesions was performed. The total area excised, including lesion and margins of forehead 1cm by 2cm  And 6 mm. An appropriate  dressing was applied.  The procedure was well tolerated and without complications. Specimen was sent.    Assessment: atypical nevus times 2    Plan: Follow up: Return for suture removal in 7 days. Wound care instructions provided.  Patient was instructed to be alert for any signs of cutaneous infection.

## 2023-03-17 ENCOUNTER — HOSPITAL ENCOUNTER (OUTPATIENT)
Dept: CT IMAGING | Facility: HOSPITAL | Age: 63
Discharge: HOME | End: 2023-03-17
Attending: FAMILY MEDICINE | Admitting: FAMILY MEDICINE
Payer: COMMERCIAL

## 2023-03-17 DIAGNOSIS — F17.201 TOBACCO ABUSE, IN REMISSION: ICD-10-CM

## 2023-03-17 PROCEDURE — 71271 CT THORAX LUNG CANCER SCR C-: CPT

## 2023-03-19 LAB
PATH REPORT.COMMENTS IMP SPEC: NORMAL
PATH REPORT.FINAL DX SPEC: NORMAL
PATH REPORT.GROSS SPEC: NORMAL
PATH REPORT.MICROSCOPIC SPEC OTHER STN: NORMAL
PATH REPORT.RELEVANT HX SPEC: NORMAL
PHOTO IMAGE: NORMAL

## 2023-03-22 ENCOUNTER — OFFICE VISIT (OUTPATIENT)
Dept: FAMILY MEDICINE | Facility: OTHER | Age: 63
End: 2023-03-22
Attending: FAMILY MEDICINE
Payer: COMMERCIAL

## 2023-03-22 VITALS
TEMPERATURE: 98.1 F | HEIGHT: 73 IN | DIASTOLIC BLOOD PRESSURE: 83 MMHG | HEART RATE: 89 BPM | WEIGHT: 225.3 LBS | OXYGEN SATURATION: 98 % | SYSTOLIC BLOOD PRESSURE: 146 MMHG | BODY MASS INDEX: 29.86 KG/M2

## 2023-03-22 DIAGNOSIS — C44.310 BCC (BASAL CELL CARCINOMA), FACE: Primary | ICD-10-CM

## 2023-03-22 DIAGNOSIS — Z48.02 VISIT FOR SUTURE REMOVAL: ICD-10-CM

## 2023-03-22 DIAGNOSIS — C44.91: ICD-10-CM

## 2023-03-22 PROCEDURE — 88305 TISSUE EXAM BY PATHOLOGIST: CPT | Mod: TC | Performed by: FAMILY MEDICINE

## 2023-03-22 PROCEDURE — 11642 EXC F/E/E/N/L MAL+MRG 1.1-2: CPT | Performed by: FAMILY MEDICINE

## 2023-03-22 PROCEDURE — 99024 POSTOP FOLLOW-UP VISIT: CPT | Mod: 25 | Performed by: FAMILY MEDICINE

## 2023-03-22 PROCEDURE — 88305 TISSUE EXAM BY PATHOLOGIST: CPT | Mod: 26 | Performed by: PATHOLOGY

## 2023-03-22 ASSESSMENT — PAIN SCALES - GENERAL: PAINLEVEL: NO PAIN (0)

## 2023-03-22 NOTE — PROGRESS NOTES
"  Assessment & Plan     BCC (basal cell carcinoma), face  Recurrent basal cell carcinoma (BCC) following excision  Discussed - needs re-excision due to deep margins being positive - he agreed. See procedure   - Surgical Pathology Exam  - SURGICAL TRAY - MINOR        Visit for suture removal  Suture removed                BMI:   Estimated body mass index is 29.72 kg/m  as calculated from the following:    Height as of this encounter: 1.854 m (6' 1\").    Weight as of this encounter: 102.2 kg (225 lb 4.8 oz).           No follow-ups on file.    Ayo Sanchez MD  [facility] [place]    Subjective   Joseph is a 63 year old, presenting for the following health issues:  Suture Removal  No flowsheet data found.  HPI   Joseph Stanford presents to the clinic for removal of sutures. The patient has had sutures in place for 7 days. There has been no patient reported signs or symptoms of infection or drainage. 4  sutures and sutures,staples, staple, steri strips are seen and located on the forehead and right cheek . Tetanus status is up to date. All sutures and sutures,staples, steri strips were easily removed today. Routine wound care discussed by the RN or provider. The patient will follow up as needed.               Review of Systems         Objective    BP (!) 146/83 (BP Location: Left arm, Patient Position: Sitting, Cuff Size: Adult Regular)   Pulse 89   Temp 98.1  F (36.7  C) (Tympanic)   Ht 1.854 m (6' 1\")   Wt 102.2 kg (225 lb 4.8 oz)   SpO2 98%   BMI 29.72 kg/m    Body mass index is 29.72 kg/m .  Physical Exam     Forehead incision healing well- stitches intact  Cheek lesion healing well. Suture removed                 Subjective: Joseph Stanford a 63 year old male who presents today for lesion removal. The lesion is located on the face,  and measures just under cm.  He denies other significant symptoms on ROS. Medications reviewed.    Objective: The area was prepped and appropriately " anesthetized. Using the usual technique, full thickness elliptical excision in total was performed. The total area excised, including lesion and margins was 1by 2 2cm re-excision done. An appropriate  dressing was applied.  The procedure was well tolerated and without complications. Specimen was sent.    Assessment: BCC with positive margins    Plan: Follow up: Return for suture removal in 7 days. Wound care instructions provided.  Patient was instructed to be alert for any signs of cutaneous infection.

## 2023-03-30 ENCOUNTER — OFFICE VISIT (OUTPATIENT)
Dept: FAMILY MEDICINE | Facility: OTHER | Age: 63
End: 2023-03-30
Attending: FAMILY MEDICINE
Payer: COMMERCIAL

## 2023-03-30 VITALS
OXYGEN SATURATION: 98 % | DIASTOLIC BLOOD PRESSURE: 84 MMHG | TEMPERATURE: 96.8 F | HEIGHT: 73 IN | SYSTOLIC BLOOD PRESSURE: 131 MMHG | HEART RATE: 69 BPM | WEIGHT: 215.7 LBS | BODY MASS INDEX: 28.59 KG/M2

## 2023-03-30 DIAGNOSIS — Z48.02 VISIT FOR SUTURE REMOVAL: ICD-10-CM

## 2023-03-30 DIAGNOSIS — C44.310 BCC (BASAL CELL CARCINOMA), FACE: Primary | ICD-10-CM

## 2023-03-30 PROCEDURE — 0124A COVID-19 VACCINE BIVALENT BOOSTER 12+ (PFIZER): CPT | Performed by: FAMILY MEDICINE

## 2023-03-30 PROCEDURE — 91312 COVID-19 VACCINE BIVALENT BOOSTER 12+ (PFIZER): CPT | Performed by: FAMILY MEDICINE

## 2023-03-30 PROCEDURE — 99024 POSTOP FOLLOW-UP VISIT: CPT | Performed by: FAMILY MEDICINE

## 2023-03-30 ASSESSMENT — PAIN SCALES - GENERAL: PAINLEVEL: NO PAIN (0)

## 2023-10-27 ENCOUNTER — IMMUNIZATION (OUTPATIENT)
Dept: FAMILY MEDICINE | Facility: OTHER | Age: 63
End: 2023-10-27
Attending: FAMILY MEDICINE
Payer: COMMERCIAL

## 2023-10-27 PROCEDURE — 91320 SARSCV2 VAC 30MCG TRS-SUC IM: CPT

## 2023-10-27 PROCEDURE — 90480 ADMN SARSCOV2 VAC 1/ONLY CMP: CPT

## 2023-12-07 ENCOUNTER — ALLIED HEALTH/NURSE VISIT (OUTPATIENT)
Dept: FAMILY MEDICINE | Facility: OTHER | Age: 63
End: 2023-12-07
Attending: FAMILY MEDICINE
Payer: COMMERCIAL

## 2023-12-07 DIAGNOSIS — Z23 NEED FOR PROPHYLACTIC VACCINATION AND INOCULATION AGAINST INFLUENZA: Primary | ICD-10-CM

## 2023-12-07 PROCEDURE — 90471 IMMUNIZATION ADMIN: CPT

## 2023-12-07 PROCEDURE — 90686 IIV4 VACC NO PRSV 0.5 ML IM: CPT

## 2024-01-01 DIAGNOSIS — Z82.49 FH: HEART DISEASE: ICD-10-CM

## 2024-01-01 DIAGNOSIS — N40.0 BENIGN NON-NODULAR PROSTATIC HYPERPLASIA: ICD-10-CM

## 2024-01-01 DIAGNOSIS — E78.2 MIXED HYPERLIPIDEMIA: ICD-10-CM

## 2024-01-02 RX ORDER — SIMVASTATIN 20 MG
20 TABLET ORAL AT BEDTIME
Qty: 90 TABLET | Refills: 0 | Status: SHIPPED | OUTPATIENT
Start: 2024-01-02 | End: 2024-01-11

## 2024-01-02 RX ORDER — DUTASTERIDE 0.5 MG/1
0.5 CAPSULE, LIQUID FILLED ORAL DAILY
Qty: 90 CAPSULE | Refills: 0 | Status: SHIPPED | OUTPATIENT
Start: 2024-01-02 | End: 2024-01-11

## 2024-01-02 RX ORDER — FENOFIBRATE 145 MG/1
145 TABLET, COATED ORAL DAILY
Qty: 90 TABLET | Refills: 0 | Status: SHIPPED | OUTPATIENT
Start: 2024-01-02 | End: 2024-01-11

## 2024-01-02 NOTE — TELEPHONE ENCOUNTER
Zocor      Last Written Prescription Date:  2/22/2023  Last Fill Quantity: 90,   # refills: 2  Last Office Visit: 03/30/2023    Tricor      Last Written Prescription Date:  2/22/2023  Last Fill Quantity: 90,   # refills: 0  Last Office Visit: 3/30/2023         Avodart       Last Written Prescription Date:  2/22/2023  Last Fill Quantity: 90,   # refills: 2  Last Office Visit: 3/30/2023  Future Office visit:    Next 5 appointments (look out 90 days)      Jan 11, 2024  2:15 PM  (Arrive by 2:00 PM)  PHYSICAL with Ayo Sanchez MD  [facility] [place] ([facility] [place] ) [address]  [place] [address]  [phone]

## 2024-01-11 ENCOUNTER — OFFICE VISIT (OUTPATIENT)
Dept: FAMILY MEDICINE | Facility: OTHER | Age: 64
End: 2024-01-11
Attending: FAMILY MEDICINE
Payer: COMMERCIAL

## 2024-01-11 ENCOUNTER — LAB (OUTPATIENT)
Dept: LAB | Facility: OTHER | Age: 64
End: 2024-01-11
Attending: FAMILY MEDICINE
Payer: COMMERCIAL

## 2024-01-11 VITALS
OXYGEN SATURATION: 97 % | SYSTOLIC BLOOD PRESSURE: 133 MMHG | BODY MASS INDEX: 29.49 KG/M2 | WEIGHT: 223.5 LBS | HEART RATE: 83 BPM | DIASTOLIC BLOOD PRESSURE: 81 MMHG | TEMPERATURE: 98.2 F

## 2024-01-11 DIAGNOSIS — Z82.49 FH: HEART DISEASE: ICD-10-CM

## 2024-01-11 DIAGNOSIS — N40.0 HYPERTROPHY OF PROSTATE WITHOUT URINARY OBSTRUCTION: ICD-10-CM

## 2024-01-11 DIAGNOSIS — Z00.00 ANNUAL PHYSICAL EXAM: Primary | ICD-10-CM

## 2024-01-11 DIAGNOSIS — Z71.85 IMMUNIZATION COUNSELING: ICD-10-CM

## 2024-01-11 DIAGNOSIS — Z00.00 ANNUAL PHYSICAL EXAM: ICD-10-CM

## 2024-01-11 DIAGNOSIS — R35.1 NOCTURIA: ICD-10-CM

## 2024-01-11 DIAGNOSIS — N40.0 BENIGN NON-NODULAR PROSTATIC HYPERPLASIA: ICD-10-CM

## 2024-01-11 DIAGNOSIS — E78.2 MIXED HYPERLIPIDEMIA: ICD-10-CM

## 2024-01-11 LAB
ALBUMIN SERPL BCG-MCNC: 4.9 G/DL (ref 3.5–5.2)
ALP SERPL-CCNC: 27 U/L (ref 40–150)
ALT SERPL W P-5'-P-CCNC: 30 U/L (ref 0–70)
ANION GAP SERPL CALCULATED.3IONS-SCNC: 13 MMOL/L (ref 7–15)
AST SERPL W P-5'-P-CCNC: 25 U/L (ref 0–45)
BASOPHILS # BLD AUTO: 0.1 10E3/UL (ref 0–0.2)
BASOPHILS NFR BLD AUTO: 1 %
BILIRUB SERPL-MCNC: 0.5 MG/DL
BUN SERPL-MCNC: 22.3 MG/DL (ref 8–23)
CALCIUM SERPL-MCNC: 9.7 MG/DL (ref 8.8–10.2)
CHLORIDE SERPL-SCNC: 102 MMOL/L (ref 98–107)
CHOLEST SERPL-MCNC: 180 MG/DL
CREAT SERPL-MCNC: 1.19 MG/DL (ref 0.67–1.17)
DEPRECATED HCO3 PLAS-SCNC: 25 MMOL/L (ref 22–29)
EGFRCR SERPLBLD CKD-EPI 2021: 69 ML/MIN/1.73M2
EOSINOPHIL # BLD AUTO: 0.4 10E3/UL (ref 0–0.7)
EOSINOPHIL NFR BLD AUTO: 7 %
ERYTHROCYTE [DISTWIDTH] IN BLOOD BY AUTOMATED COUNT: 12.3 % (ref 10–15)
FASTING STATUS PATIENT QL REPORTED: NO
GLUCOSE SERPL-MCNC: 97 MG/DL (ref 70–99)
HCT VFR BLD AUTO: 42 % (ref 40–53)
HDLC SERPL-MCNC: 53 MG/DL
HGB BLD-MCNC: 14.2 G/DL (ref 13.3–17.7)
IMM GRANULOCYTES # BLD: 0 10E3/UL
IMM GRANULOCYTES NFR BLD: 0 %
LDLC SERPL CALC-MCNC: 104 MG/DL
LYMPHOCYTES # BLD AUTO: 1.5 10E3/UL (ref 0.8–5.3)
LYMPHOCYTES NFR BLD AUTO: 29 %
MCH RBC QN AUTO: 30.7 PG (ref 26.5–33)
MCHC RBC AUTO-ENTMCNC: 33.8 G/DL (ref 31.5–36.5)
MCV RBC AUTO: 91 FL (ref 78–100)
MONOCYTES # BLD AUTO: 0.8 10E3/UL (ref 0–1.3)
MONOCYTES NFR BLD AUTO: 15 %
NEUTROPHILS # BLD AUTO: 2.5 10E3/UL (ref 1.6–8.3)
NEUTROPHILS NFR BLD AUTO: 48 %
NONHDLC SERPL-MCNC: 127 MG/DL
NRBC # BLD AUTO: 0 10E3/UL
NRBC BLD AUTO-RTO: 0 /100
PLATELET # BLD AUTO: 262 10E3/UL (ref 150–450)
POTASSIUM SERPL-SCNC: 4.1 MMOL/L (ref 3.4–5.3)
PROT SERPL-MCNC: 7.4 G/DL (ref 6.4–8.3)
PSA SERPL DL<=0.01 NG/ML-MCNC: 0.33 NG/ML (ref 0–4.5)
RBC # BLD AUTO: 4.62 10E6/UL (ref 4.4–5.9)
SODIUM SERPL-SCNC: 140 MMOL/L (ref 135–145)
TRIGL SERPL-MCNC: 113 MG/DL
TSH SERPL DL<=0.005 MIU/L-ACNC: 1.79 UIU/ML (ref 0.3–4.2)
WBC # BLD AUTO: 5.3 10E3/UL (ref 4–11)

## 2024-01-11 PROCEDURE — 90678 RSV VACC PREF BIVALENT IM: CPT | Performed by: FAMILY MEDICINE

## 2024-01-11 PROCEDURE — 80061 LIPID PANEL: CPT

## 2024-01-11 PROCEDURE — 80050 GENERAL HEALTH PANEL: CPT

## 2024-01-11 PROCEDURE — 99213 OFFICE O/P EST LOW 20 MIN: CPT | Mod: 25 | Performed by: FAMILY MEDICINE

## 2024-01-11 PROCEDURE — 36415 COLL VENOUS BLD VENIPUNCTURE: CPT

## 2024-01-11 PROCEDURE — 99396 PREV VISIT EST AGE 40-64: CPT | Mod: 25 | Performed by: FAMILY MEDICINE

## 2024-01-11 PROCEDURE — 90471 IMMUNIZATION ADMIN: CPT | Performed by: FAMILY MEDICINE

## 2024-01-11 PROCEDURE — 84153 ASSAY OF PSA TOTAL: CPT

## 2024-01-11 RX ORDER — SIMVASTATIN 20 MG
20 TABLET ORAL AT BEDTIME
Qty: 90 TABLET | Refills: 0 | Status: SHIPPED | OUTPATIENT
Start: 2024-01-11 | End: 2024-07-03

## 2024-01-11 RX ORDER — DUTASTERIDE 0.5 MG/1
0.5 CAPSULE, LIQUID FILLED ORAL DAILY
Qty: 90 CAPSULE | Refills: 3 | Status: SHIPPED | OUTPATIENT
Start: 2024-01-11

## 2024-01-11 RX ORDER — FENOFIBRATE 145 MG/1
145 TABLET, COATED ORAL DAILY
Qty: 90 TABLET | Refills: 3 | Status: SHIPPED | OUTPATIENT
Start: 2024-01-11

## 2024-01-11 ASSESSMENT — ENCOUNTER SYMPTOMS
HEADACHES: 0
EYE PAIN: 0
HEMATOCHEZIA: 0
FREQUENCY: 0
PALPITATIONS: 0
NAUSEA: 0
JOINT SWELLING: 0
NERVOUS/ANXIOUS: 0
ABDOMINAL PAIN: 0
MYALGIAS: 0
FEVER: 0
HEARTBURN: 0
DIZZINESS: 0
SHORTNESS OF BREATH: 0
DIARRHEA: 0
CHILLS: 0
ARTHRALGIAS: 1
HEMATURIA: 0
COUGH: 0
SORE THROAT: 0
CONSTIPATION: 1
DYSURIA: 0
PARESTHESIAS: 0
WEAKNESS: 0

## 2024-01-11 ASSESSMENT — ANXIETY QUESTIONNAIRES
GAD7 TOTAL SCORE: 0
8. IF YOU CHECKED OFF ANY PROBLEMS, HOW DIFFICULT HAVE THESE MADE IT FOR YOU TO DO YOUR WORK, TAKE CARE OF THINGS AT HOME, OR GET ALONG WITH OTHER PEOPLE?: NOT DIFFICULT AT ALL
7. FEELING AFRAID AS IF SOMETHING AWFUL MIGHT HAPPEN: NOT AT ALL
1. FEELING NERVOUS, ANXIOUS, OR ON EDGE: NOT AT ALL
3. WORRYING TOO MUCH ABOUT DIFFERENT THINGS: NOT AT ALL
6. BECOMING EASILY ANNOYED OR IRRITABLE: NOT AT ALL
2. NOT BEING ABLE TO STOP OR CONTROL WORRYING: NOT AT ALL
5. BEING SO RESTLESS THAT IT IS HARD TO SIT STILL: NOT AT ALL
GAD7 TOTAL SCORE: 0
7. FEELING AFRAID AS IF SOMETHING AWFUL MIGHT HAPPEN: NOT AT ALL
GAD7 TOTAL SCORE: 0
IF YOU CHECKED OFF ANY PROBLEMS ON THIS QUESTIONNAIRE, HOW DIFFICULT HAVE THESE PROBLEMS MADE IT FOR YOU TO DO YOUR WORK, TAKE CARE OF THINGS AT HOME, OR GET ALONG WITH OTHER PEOPLE: NOT DIFFICULT AT ALL
4. TROUBLE RELAXING: NOT AT ALL

## 2024-01-11 ASSESSMENT — PATIENT HEALTH QUESTIONNAIRE - PHQ9
SUM OF ALL RESPONSES TO PHQ QUESTIONS 1-9: 0
SUM OF ALL RESPONSES TO PHQ QUESTIONS 1-9: 0
10. IF YOU CHECKED OFF ANY PROBLEMS, HOW DIFFICULT HAVE THESE PROBLEMS MADE IT FOR YOU TO DO YOUR WORK, TAKE CARE OF THINGS AT HOME, OR GET ALONG WITH OTHER PEOPLE: NOT DIFFICULT AT ALL

## 2024-01-11 ASSESSMENT — PAIN SCALES - GENERAL: PAINLEVEL: NO PAIN (0)

## 2024-01-11 NOTE — PROGRESS NOTES
SUBJECTIVE:   Joseph is a 63 year old, presenting for the following:  Physical and Lipids        2024     1:33 PM   Additional Questions   Roomed by Aubrie Washington   Accompanied by None         2024     1:33 PM   Patient Reported Additional Medications   Patient reports taking the following new medications None       Healthy Habits:     Getting at least 3 servings of Calcium per day:  Yes    Bi-annual eye exam:  Yes    Dental care twice a year:  NO    Sleep apnea or symptoms of sleep apnea:  None    Diet:  Regular (no restrictions)    Frequency of exercise:  6-7 days/week    Duration of exercise:  15-30 minutes    Taking medications regularly:  Yes    Medication side effects:  None    Additional concerns today:  No            Hyperlipidemia Follow-Up    Are you regularly taking any medication or supplement to lower your cholesterol?   Yes- Zocor   Are you having muscle aches or other side effects that you think could be caused by your cholesterol lowering medication?  No      Social History     Tobacco Use    Smoking status: Former     Packs/day: 1.00     Years: 30.00     Additional pack years: 0.00     Total pack years: 30.00     Types: Cigarettes     Start date: 1978     Quit date: 2008     Years since quittin.0    Smokeless tobacco: Former     Quit date:    Substance Use Topics    Alcohol use: Yes     Comment: socially             2024     1:45 PM   Alcohol Use   Prescreen: >3 drinks/day or >7 drinks/week? Yes   AUDIT SCORE  6       Last PSA:   PSA   Date Value Ref Range Status   2021 0.48 0 - 4 ug/L Final     Comment:     Assay Method:  Chemiluminescence using Siemens Vista analyzer     PSA Tumor Marker   Date Value Ref Range Status   2023 0.36 0.00 - 4.50 ng/mL Final   2022 0.56 0.00 - 4.00 ug/L Final       Reviewed orders with patient. Reviewed health maintenance and updated orders accordingly - Yes  Labs reviewed in EPIC    Reviewed and updated as needed  this visit by clinical staff   Tobacco  Allergies  Meds              Reviewed and updated as needed this visit by Provider                 Past Medical History:   Diagnosis Date    BPH (benign prostatic hypertrophy) 8/14/2012    Diverticulosis 12/20/2011    2003    Mixed hyperlipidemia 2/8/2011    Peyronie's disease 12/20/2011    Tobacco abuse, in remission     Tobacco use disorder 2/8/2011    Quit 8/2013      Past Surgical History:   Procedure Laterality Date    ARTHROSCOPY KNEE  1980    left    ARTHROSCOPY KNEE  2000    left    colon resection  2004    COLONOSCOPY  2003    repeat 2013    COLONOSCOPY N/A 2/5/2015    Repeat in 2025//Procedure: COLONOSCOPY;  Surgeon: Keshia Vivar MD;  Location: [facility]    COLOSTOMY  2003    Diverticulosis with perforation    PHACOEMULSIFICATION WITH STANDARD INTRAOCULAR LENS IMPLANT Right 2/26/2019    Procedure: PHACOEMULSIFICATION CATARACT EXTRACTION POSTERIOR CHAMBER LENS RIGHT;  Surgeon: Shankar Cross MD;  Location: [facility]    PHACOEMULSIFICATION WITH STANDARD INTRAOCULAR LENS IMPLANT Left 2/27/2023    Procedure: PHACOEMULSIFICATION CATARACT EXTRACTION POSTERIOR CHAMBER LENS LEFT EYE;  Surgeon: Shankar Cross MD;  Location: [facility]       Review of Systems   Constitutional:  Negative for chills and fever.   HENT:  Positive for hearing loss. Negative for congestion, ear pain and sore throat.    Eyes:  Negative for pain and visual disturbance.   Respiratory:  Negative for cough and shortness of breath.    Cardiovascular:  Positive for peripheral edema. Negative for chest pain and palpitations.   Gastrointestinal:  Positive for constipation. Negative for abdominal pain, diarrhea, heartburn, hematochezia and nausea.   Genitourinary:  Negative for dysuria, frequency, genital sores, hematuria, impotence, penile discharge and urgency.   Musculoskeletal:  Positive for arthralgias. Negative for joint swelling and myalgias.   Skin:  Negative for rash.   Neurological:  Negative for  dizziness, weakness, headaches and paresthesias.   Psychiatric/Behavioral:  Negative for mood changes. The patient is not nervous/anxious.      CONSTITUTIONAL: NEGATIVE for fever, chills, change in weight  INTEGUMENTARY/SKIN: NEGATIVE for worrisome rashes, moles or lesions  EYES: NEGATIVE for vision changes or irritation  ENT: NEGATIVE for ear, mouth and throat problems  RESP: NEGATIVE for significant cough or SOB  CV: NEGATIVE for chest pain, palpitations or peripheral edema  GI: NEGATIVE for nausea, abdominal pain, heartburn, or change in bowel habits   male: negative for dysuria, hematuria, decreased urinary stream, erectile dysfunction, urethral discharge  MUSCULOSKELETAL: NEGATIVE for significant arthralgias or myalgia  NEURO: NEGATIVE for weakness, dizziness or paresthesias  PSYCHIATRIC: NEGATIVE for changes in mood or affect    OBJECTIVE:   /81 (BP Location: Right arm, Patient Position: Sitting, Cuff Size: Adult Large)   Pulse 83   Temp 98.2  F (36.8  C) (Tympanic)   Wt 101.4 kg (223 lb 8 oz)   SpO2 97%   BMI 29.49 kg/m      Physical Exam  GENERAL: healthy, alert and no distress  EYES: Eyes grossly normal to inspection, PERRL and conjunctivae and sclerae normal  HENT: ear canals and TM's normal, nose and mouth without ulcers or lesions  NECK: no adenopathy, no asymmetry, masses, or scars and thyroid normal to palpation  RESP: lungs clear to auscultation - no rales, rhonchi or wheezes  CV: regular rate and rhythm, normal S1 S2, no S3 or S4, no murmur, click or rub, no peripheral edema and peripheral pulses strong  ABDOMEN: soft, nontender, no hepatosplenomegaly, no masses and bowel sounds normal   (male): normal male genitalia without lesions or urethral discharge, no hernia  MS: no gross musculoskeletal defects noted, no edema  SKIN: no suspicious lesions or rashes  NEURO: Normal strength and tone, mentation intact and speech normal  PSYCH: mentation appears normal, affect  normal/bright    Results for orders placed or performed in visit on 01/11/24   Comprehensive metabolic panel     Status: Abnormal   Result Value Ref Range    Sodium 140 135 - 145 mmol/L    Potassium 4.1 3.4 - 5.3 mmol/L    Carbon Dioxide (CO2) 25 22 - 29 mmol/L    Anion Gap 13 7 - 15 mmol/L    Urea Nitrogen 22.3 8.0 - 23.0 mg/dL    Creatinine 1.19 (H) 0.67 - 1.17 mg/dL    GFR Estimate 69 >60 mL/min/1.73m2    Calcium 9.7 8.8 - 10.2 mg/dL    Chloride 102 98 - 107 mmol/L    Glucose 97 70 - 99 mg/dL    Alkaline Phosphatase 27 (L) 40 - 150 U/L    AST 25 0 - 45 U/L    ALT 30 0 - 70 U/L    Protein Total 7.4 6.4 - 8.3 g/dL    Albumin 4.9 3.5 - 5.2 g/dL    Bilirubin Total 0.5 <=1.2 mg/dL   Lipid Profile     Status: Abnormal   Result Value Ref Range    Cholesterol 180 <200 mg/dL    Triglycerides 113 <150 mg/dL    Direct Measure HDL 53 >=40 mg/dL    LDL Cholesterol Calculated 104 (H) <=100 mg/dL    Non HDL Cholesterol 127 <130 mg/dL    Patient Fasting > 8hrs? No     Narrative    Cholesterol  Desirable:  <200 mg/dL    Triglycerides  Normal:  Less than 150 mg/dL  Borderline High:  150-199 mg/dL  High:  200-499 mg/dL  Very High:  Greater than or equal to 500 mg/dL    Direct Measure HDL  Female:  Greater than or equal to 50 mg/dL   Male:  Greater than or equal to 40 mg/dL    LDL Cholesterol  Desirable:  <100mg/dL  Above Desirable:  100-129 mg/dL   Borderline High:  130-159 mg/dL   High:  160-189 mg/dL   Very High:  >= 190 mg/dL    Non HDL Cholesterol  Desirable:  130 mg/dL  Above Desirable:  130-159 mg/dL  Borderline High:  160-189 mg/dL  High:  190-219 mg/dL  Very High:  Greater than or equal to 220 mg/dL   TSH     Status: Normal   Result Value Ref Range    TSH 1.79 0.30 - 4.20 uIU/mL   PSA tumor marker     Status: Normal   Result Value Ref Range    PSA Tumor Marker 0.33 0.00 - 4.50 ng/mL    Narrative    This result is obtained using the Roche Elecsys total PSA method on the amador e601 immunoassay analyzer. Results obtained with  different assay methods or kits cannot be used interchangeably.   CBC with platelets and differential     Status: None   Result Value Ref Range    WBC Count 5.3 4.0 - 11.0 10e3/uL    RBC Count 4.62 4.40 - 5.90 10e6/uL    Hemoglobin 14.2 13.3 - 17.7 g/dL    Hematocrit 42.0 40.0 - 53.0 %    MCV 91 78 - 100 fL    MCH 30.7 26.5 - 33.0 pg    MCHC 33.8 31.5 - 36.5 g/dL    RDW 12.3 10.0 - 15.0 %    Platelet Count 262 150 - 450 10e3/uL    % Neutrophils 48 %    % Lymphocytes 29 %    % Monocytes 15 %    % Eosinophils 7 %    % Basophils 1 %    % Immature Granulocytes 0 %    NRBCs per 100 WBC 0 <1 /100    Absolute Neutrophils 2.5 1.6 - 8.3 10e3/uL    Absolute Lymphocytes 1.5 0.8 - 5.3 10e3/uL    Absolute Monocytes 0.8 0.0 - 1.3 10e3/uL    Absolute Eosinophils 0.4 0.0 - 0.7 10e3/uL    Absolute Basophils 0.1 0.0 - 0.2 10e3/uL    Absolute Immature Granulocytes 0.0 <=0.4 10e3/uL    Absolute NRBCs 0.0 10e3/uL   CBC with Platelets & Differential     Status: None    Narrative    The following orders were created for panel order CBC with Platelets & Differential.  Procedure                               Abnormality         Status                     ---------                               -----------         ------                     CBC with platelets and d...[[id number]]                      Final result                 Please view results for these tests on the individual orders.         ASSESSMENT/PLAN:   (Z00.00) Annual physical exam  (primary encounter diagnosis)  Comment: doing real well ivone after FDC   Plan: CBC with Platelets & Differential,         Comprehensive metabolic panel, Lipid Profile,         TSH        Follow-up in  a year     (E78.2) Mixed hyperlipidemia  Comment: stable. Continue current medications and behavioral changes.   Plan: CBC with Platelets & Differential,         Comprehensive metabolic panel, Lipid Profile,         TSH, fenofibrate (TRICOR) 145 MG tablet,         simvastatin (ZOCOR) 20 MG  "tablet            (N40.0) Hypertrophy of prostate without urinary obstruction  Comment: stable on meds. Continue current medications and behavioral changes.   Plan: CBC with Platelets & Differential,         Comprehensive metabolic panel, Lipid Profile,         TSH            (R35.1) Nocturia  Comment: stable / improved with meds.   Plan: PSA tumor marker            (Z71.85) Immunization counseling  Comment: RSV due   Plan: discussed and done today     (N40.0) Benign non-nodular prostatic hyperplasia  Comment: as above  Plan: dutasteride (AVODART) 0.5 MG capsule        RF done     (Z82.49) FH: heart disease  Comment: no anginal sx. Very active   Plan: simvastatin (ZOCOR) 20 MG tablet                  COUNSELING:   Reviewed preventive health counseling, as reflected in patient instructions       Regular exercise       Healthy diet/nutrition       Colorectal cancer screening       Prostate cancer screening      BMI:   Estimated body mass index is 29.49 kg/m  as calculated from the following:    Height as of 3/30/23: 1.854 m (6' 1\").    Weight as of this encounter: 101.4 kg (223 lb 8 oz).         He reports that he quit smoking about 16 years ago. His smoking use included cigarettes. He started smoking about 46 years ago. He has a 30 pack-year smoking history. He quit smokeless tobacco use about 9 years ago.            Ayo Sanchez MD  [facility] - [place]  Answers submitted by the patient for this visit:  Patient Health Questionnaire (Submitted on 1/11/2024)  If you checked off any problems, how difficult have these problems made it for you to do your work, take care of things at home, or get along with other people?: Not difficult at all  PHQ9 TOTAL SCORE: 0  RENETTA-7 (Submitted on 1/11/2024)  RENETTA 7 TOTAL SCORE: 0    "

## 2024-01-11 NOTE — PATIENT INSTRUCTIONS
Results for orders placed or performed in visit on 01/11/24   Comprehensive metabolic panel     Status: Abnormal   Result Value Ref Range    Sodium 140 135 - 145 mmol/L    Potassium 4.1 3.4 - 5.3 mmol/L    Carbon Dioxide (CO2) 25 22 - 29 mmol/L    Anion Gap 13 7 - 15 mmol/L    Urea Nitrogen 22.3 8.0 - 23.0 mg/dL    Creatinine 1.19 (H) 0.67 - 1.17 mg/dL    GFR Estimate 69 >60 mL/min/1.73m2    Calcium 9.7 8.8 - 10.2 mg/dL    Chloride 102 98 - 107 mmol/L    Glucose 97 70 - 99 mg/dL    Alkaline Phosphatase 27 (L) 40 - 150 U/L    AST 25 0 - 45 U/L    ALT 30 0 - 70 U/L    Protein Total 7.4 6.4 - 8.3 g/dL    Albumin 4.9 3.5 - 5.2 g/dL    Bilirubin Total 0.5 <=1.2 mg/dL   Lipid Profile     Status: Abnormal   Result Value Ref Range    Cholesterol 180 <200 mg/dL    Triglycerides 113 <150 mg/dL    Direct Measure HDL 53 >=40 mg/dL    LDL Cholesterol Calculated 104 (H) <=100 mg/dL    Non HDL Cholesterol 127 <130 mg/dL    Patient Fasting > 8hrs? No     Narrative    Cholesterol  Desirable:  <200 mg/dL    Triglycerides  Normal:  Less than 150 mg/dL  Borderline High:  150-199 mg/dL  High:  200-499 mg/dL  Very High:  Greater than or equal to 500 mg/dL    Direct Measure HDL  Female:  Greater than or equal to 50 mg/dL   Male:  Greater than or equal to 40 mg/dL    LDL Cholesterol  Desirable:  <100mg/dL  Above Desirable:  100-129 mg/dL   Borderline High:  130-159 mg/dL   High:  160-189 mg/dL   Very High:  >= 190 mg/dL    Non HDL Cholesterol  Desirable:  130 mg/dL  Above Desirable:  130-159 mg/dL  Borderline High:  160-189 mg/dL  High:  190-219 mg/dL  Very High:  Greater than or equal to 220 mg/dL   TSH     Status: Normal   Result Value Ref Range    TSH 1.79 0.30 - 4.20 uIU/mL   PSA tumor marker     Status: Normal   Result Value Ref Range    PSA Tumor Marker 0.33 0.00 - 4.50 ng/mL    Narrative    This result is obtained using the Roche Elecsys total PSA method on the amador e601 immunoassay analyzer. Results obtained with different assay  methods or kits cannot be used interchangeably.   CBC with platelets and differential     Status: None   Result Value Ref Range    WBC Count 5.3 4.0 - 11.0 10e3/uL    RBC Count 4.62 4.40 - 5.90 10e6/uL    Hemoglobin 14.2 13.3 - 17.7 g/dL    Hematocrit 42.0 40.0 - 53.0 %    MCV 91 78 - 100 fL    MCH 30.7 26.5 - 33.0 pg    MCHC 33.8 31.5 - 36.5 g/dL    RDW 12.3 10.0 - 15.0 %    Platelet Count 262 150 - 450 10e3/uL    % Neutrophils 48 %    % Lymphocytes 29 %    % Monocytes 15 %    % Eosinophils 7 %    % Basophils 1 %    % Immature Granulocytes 0 %    NRBCs per 100 WBC 0 <1 /100    Absolute Neutrophils 2.5 1.6 - 8.3 10e3/uL    Absolute Lymphocytes 1.5 0.8 - 5.3 10e3/uL    Absolute Monocytes 0.8 0.0 - 1.3 10e3/uL    Absolute Eosinophils 0.4 0.0 - 0.7 10e3/uL    Absolute Basophils 0.1 0.0 - 0.2 10e3/uL    Absolute Immature Granulocytes 0.0 <=0.4 10e3/uL    Absolute NRBCs 0.0 10e3/uL   CBC with Platelets & Differential     Status: None    Narrative    The following orders were created for panel order CBC with Platelets & Differential.  Procedure                               Abnormality         Status                     ---------                               -----------         ------                     CBC with platelets and d...[[id number]]                      Final result                 Please view results for these tests on the individual orders.

## 2024-03-11 ENCOUNTER — TELEPHONE (OUTPATIENT)
Dept: FAMILY MEDICINE | Facility: OTHER | Age: 64
End: 2024-03-11

## 2024-03-11 DIAGNOSIS — F17.201 TOBACCO ABUSE, IN REMISSION: Primary | ICD-10-CM

## 2024-03-11 NOTE — TELEPHONE ENCOUNTER
Care Team    /   Dr. Sanchez for WEDNESDAY 03/13/2024    Please return call on WED 03/13/24 to discuss CT.  Phone is [phone]     Thank you

## 2024-03-11 NOTE — TELEPHONE ENCOUNTER
Pended CT order to PCP to review  Updated patient via phone that order is sent for review.  When signed by PCP, DI dept will call him to schedule  Call back here if any further questions  Patient relayed understanding.  No further concerns at calls end.

## 2024-03-19 ENCOUNTER — HOSPITAL ENCOUNTER (OUTPATIENT)
Dept: CT IMAGING | Facility: HOSPITAL | Age: 64
Discharge: HOME | End: 2024-03-19
Attending: NURSE PRACTITIONER | Admitting: NURSE PRACTITIONER
Payer: COMMERCIAL

## 2024-03-19 DIAGNOSIS — F17.201 TOBACCO ABUSE, IN REMISSION: ICD-10-CM

## 2024-03-19 PROCEDURE — 71271 CT THORAX LUNG CANCER SCR C-: CPT

## 2024-07-03 DIAGNOSIS — E78.2 MIXED HYPERLIPIDEMIA: ICD-10-CM

## 2024-07-03 DIAGNOSIS — Z82.49 FH: HEART DISEASE: ICD-10-CM

## 2024-07-03 RX ORDER — SIMVASTATIN 20 MG
20 TABLET ORAL AT BEDTIME
Qty: 90 TABLET | Refills: 1 | Status: SHIPPED | OUTPATIENT
Start: 2024-07-03

## 2024-10-13 ENCOUNTER — TRANSFERRED RECORDS (OUTPATIENT)
Dept: HEALTH INFORMATION MANAGEMENT | Facility: CLINIC | Age: 64
End: 2024-10-13

## 2024-10-22 ENCOUNTER — ALLIED HEALTH/NURSE VISIT (OUTPATIENT)
Dept: FAMILY MEDICINE | Facility: OTHER | Age: 64
End: 2024-10-22
Payer: COMMERCIAL

## 2024-10-22 DIAGNOSIS — Z23 HIGH PRIORITY FOR 2019-NCOV VACCINE: ICD-10-CM

## 2024-10-22 DIAGNOSIS — Z23 NEED FOR PROPHYLACTIC VACCINATION AND INOCULATION AGAINST INFLUENZA: Primary | ICD-10-CM

## 2024-10-22 PROCEDURE — 90471 IMMUNIZATION ADMIN: CPT

## 2024-10-22 PROCEDURE — 90673 RIV3 VACCINE NO PRESERV IM: CPT

## 2024-10-22 PROCEDURE — 90480 ADMN SARSCOV2 VAC 1/ONLY CMP: CPT

## 2024-10-22 PROCEDURE — 91320 SARSCV2 VAC 30MCG TRS-SUC IM: CPT

## 2024-10-22 NOTE — PROGRESS NOTES
Prior to immunization administration, verified patients identity using patient s name and date of birth. Please see Immunization Activity for additional information.     Screening Questionnaire for Adult Immunization    Are you sick today?   No   Do you have allergies to medications, food, a vaccine component or latex?   No   Have you ever had a serious reaction after receiving a vaccination?   No   Do you have a long-term health problem with heart, lung, kidney, or metabolic disease (e.g., diabetes), asthma, a blood disorder, no spleen, complement component deficiency, a cochlear implant, or a spinal fluid leak?  Are you on long-term aspirin therapy?   No   Do you have cancer, leukemia, HIV/AIDS, or any other immune system problem?   No   Do you have a parent, brother, or sister with an immune system problem?   No   In the past 3 months, have you taken medications that affect  your immune system, such as prednisone, other steroids, or anticancer drugs; drugs for the treatment of rheumatoid arthritis, Crohn s disease, or psoriasis; or have you had radiation treatments?   No   Have you had a seizure, or a brain or other nervous system problem?   No   During the past year, have you received a transfusion of blood or blood    products, or been given immune (gamma) globulin or antiviral drug?   No   For women: Are you pregnant or is there a chance you could become       pregnant during the next month?   No   Have you received any vaccinations in the past 4 weeks?   No     Immunization questionnaire answers were all negative.    I have reviewed the following standing orders:   This patient is due and qualifies for the Covid-19 vaccine.     Click here for COVID-19 Standing Order    I have reviewed the vaccines inclusion and exclusion criteria; No concerns regarding eligibility.     This patient is due and qualifies for the Influenza vaccine.    Click here for Influenza Vaccine Standing Order    I have reviewed the  vaccines inclusion and exclusion criteria; No concerns regarding eligibility.     Patient instructed to remain in clinic for 15 minutes afterwards, and to report any adverse reactions.     Screening performed by Ayesha Dao LPN on 10/22/2024 at 12:42 PM.

## 2024-12-26 DIAGNOSIS — E78.2 MIXED HYPERLIPIDEMIA: ICD-10-CM

## 2024-12-26 DIAGNOSIS — Z82.49 FH: HEART DISEASE: ICD-10-CM

## 2024-12-26 RX ORDER — SIMVASTATIN 20 MG
20 TABLET ORAL AT BEDTIME
Qty: 90 TABLET | Refills: 1 | Status: SHIPPED | OUTPATIENT
Start: 2024-12-26

## 2025-01-13 ENCOUNTER — ANCILLARY PROCEDURE (OUTPATIENT)
Dept: GENERAL RADIOLOGY | Facility: OTHER | Age: 65
End: 2025-01-13
Attending: FAMILY MEDICINE
Payer: COMMERCIAL

## 2025-01-13 ENCOUNTER — OFFICE VISIT (OUTPATIENT)
Dept: FAMILY MEDICINE | Facility: OTHER | Age: 65
End: 2025-01-13
Attending: FAMILY MEDICINE
Payer: COMMERCIAL

## 2025-01-13 VITALS
HEIGHT: 74 IN | OXYGEN SATURATION: 98 % | TEMPERATURE: 97.5 F | WEIGHT: 224.8 LBS | BODY MASS INDEX: 28.85 KG/M2 | SYSTOLIC BLOOD PRESSURE: 126 MMHG | DIASTOLIC BLOOD PRESSURE: 68 MMHG | HEART RATE: 76 BPM | RESPIRATION RATE: 16 BRPM

## 2025-01-13 DIAGNOSIS — N40.0 BENIGN NON-NODULAR PROSTATIC HYPERPLASIA: ICD-10-CM

## 2025-01-13 DIAGNOSIS — R19.5 CHANGE IN STOOL: ICD-10-CM

## 2025-01-13 DIAGNOSIS — N18.9 CHRONIC KIDNEY DISEASE, UNSPECIFIED CKD STAGE: ICD-10-CM

## 2025-01-13 DIAGNOSIS — N48.6 PEYRONIE'S DISEASE: ICD-10-CM

## 2025-01-13 DIAGNOSIS — Z00.00 ENCOUNTER FOR PREVENTIVE CARE: Primary | ICD-10-CM

## 2025-01-13 DIAGNOSIS — Z11.4 SCREENING FOR HIV (HUMAN IMMUNODEFICIENCY VIRUS): ICD-10-CM

## 2025-01-13 DIAGNOSIS — Z12.11 ENCOUNTER FOR SCREENING COLONOSCOPY: ICD-10-CM

## 2025-01-13 DIAGNOSIS — E78.2 MIXED HYPERLIPIDEMIA: ICD-10-CM

## 2025-01-13 DIAGNOSIS — Z11.59 NEED FOR HEPATITIS C SCREENING TEST: ICD-10-CM

## 2025-01-13 DIAGNOSIS — Z82.49 FH: HEART DISEASE: ICD-10-CM

## 2025-01-13 DIAGNOSIS — Z12.5 SCREENING PSA (PROSTATE SPECIFIC ANTIGEN): ICD-10-CM

## 2025-01-13 DIAGNOSIS — Z13.1 SCREENING FOR DIABETES MELLITUS: ICD-10-CM

## 2025-01-13 LAB
ALBUMIN SERPL BCG-MCNC: 4.7 G/DL (ref 3.5–5.2)
ALP SERPL-CCNC: 25 U/L (ref 40–150)
ALT SERPL W P-5'-P-CCNC: 30 U/L (ref 0–70)
ANION GAP SERPL CALCULATED.3IONS-SCNC: 9 MMOL/L (ref 7–15)
AST SERPL W P-5'-P-CCNC: 25 U/L (ref 0–45)
BASOPHILS # BLD AUTO: 0.1 10E3/UL (ref 0–0.2)
BASOPHILS NFR BLD AUTO: 1 %
BILIRUB SERPL-MCNC: 0.5 MG/DL
BUN SERPL-MCNC: 18.7 MG/DL (ref 8–23)
CALCIUM SERPL-MCNC: 9.9 MG/DL (ref 8.8–10.4)
CHLORIDE SERPL-SCNC: 102 MMOL/L (ref 98–107)
CHOLEST SERPL-MCNC: 142 MG/DL
CREAT SERPL-MCNC: 1.28 MG/DL (ref 0.67–1.17)
EGFRCR SERPLBLD CKD-EPI 2021: 63 ML/MIN/1.73M2
EOSINOPHIL # BLD AUTO: 0.2 10E3/UL (ref 0–0.7)
EOSINOPHIL NFR BLD AUTO: 4 %
ERYTHROCYTE [DISTWIDTH] IN BLOOD BY AUTOMATED COUNT: 12.3 % (ref 10–15)
FASTING STATUS PATIENT QL REPORTED: YES
FASTING STATUS PATIENT QL REPORTED: YES
GLUCOSE SERPL-MCNC: 87 MG/DL (ref 70–99)
HCO3 SERPL-SCNC: 27 MMOL/L (ref 22–29)
HCT VFR BLD AUTO: 40.6 % (ref 40–53)
HDLC SERPL-MCNC: 50 MG/DL
HGB BLD-MCNC: 14.2 G/DL (ref 13.3–17.7)
IMM GRANULOCYTES # BLD: 0 10E3/UL
IMM GRANULOCYTES NFR BLD: 0 %
LDLC SERPL CALC-MCNC: 79 MG/DL
LYMPHOCYTES # BLD AUTO: 1.4 10E3/UL (ref 0.8–5.3)
LYMPHOCYTES NFR BLD AUTO: 29 %
MCH RBC QN AUTO: 31.7 PG (ref 26.5–33)
MCHC RBC AUTO-ENTMCNC: 35 G/DL (ref 31.5–36.5)
MCV RBC AUTO: 91 FL (ref 78–100)
MONOCYTES # BLD AUTO: 0.8 10E3/UL (ref 0–1.3)
MONOCYTES NFR BLD AUTO: 16 %
NEUTROPHILS # BLD AUTO: 2.4 10E3/UL (ref 1.6–8.3)
NEUTROPHILS NFR BLD AUTO: 50 %
NONHDLC SERPL-MCNC: 92 MG/DL
NRBC # BLD AUTO: 0 10E3/UL
NRBC BLD AUTO-RTO: 0 /100
PLATELET # BLD AUTO: 250 10E3/UL (ref 150–450)
POTASSIUM SERPL-SCNC: 4.7 MMOL/L (ref 3.4–5.3)
PROT SERPL-MCNC: 7.5 G/DL (ref 6.4–8.3)
PSA SERPL DL<=0.01 NG/ML-MCNC: 0.41 NG/ML (ref 0–4.5)
RBC # BLD AUTO: 4.48 10E6/UL (ref 4.4–5.9)
SODIUM SERPL-SCNC: 138 MMOL/L (ref 135–145)
TRIGL SERPL-MCNC: 64 MG/DL
TSH SERPL DL<=0.005 MIU/L-ACNC: 1.69 UIU/ML (ref 0.3–4.2)
WBC # BLD AUTO: 4.9 10E3/UL (ref 4–11)

## 2025-01-13 PROCEDURE — 86803 HEPATITIS C AB TEST: CPT | Performed by: FAMILY MEDICINE

## 2025-01-13 PROCEDURE — 74018 RADEX ABDOMEN 1 VIEW: CPT | Mod: TC | Performed by: RADIOLOGY

## 2025-01-13 PROCEDURE — 80061 LIPID PANEL: CPT | Performed by: FAMILY MEDICINE

## 2025-01-13 PROCEDURE — 80050 GENERAL HEALTH PANEL: CPT | Performed by: FAMILY MEDICINE

## 2025-01-13 PROCEDURE — G0103 PSA SCREENING: HCPCS | Performed by: FAMILY MEDICINE

## 2025-01-13 PROCEDURE — 36415 COLL VENOUS BLD VENIPUNCTURE: CPT | Performed by: FAMILY MEDICINE

## 2025-01-13 PROCEDURE — 87389 HIV-1 AG W/HIV-1&-2 AB AG IA: CPT | Performed by: FAMILY MEDICINE

## 2025-01-13 RX ORDER — SIMVASTATIN 20 MG
20 TABLET ORAL AT BEDTIME
Qty: 90 TABLET | Refills: 1 | Status: SHIPPED | OUTPATIENT
Start: 2025-01-13

## 2025-01-13 RX ORDER — ASPIRIN 81 MG/1
81 TABLET, CHEWABLE ORAL DAILY
Qty: 90 TABLET | Refills: 3 | Status: SHIPPED | OUTPATIENT
Start: 2025-01-13

## 2025-01-13 RX ORDER — FENOFIBRATE 145 MG/1
145 TABLET, COATED ORAL DAILY
Qty: 90 TABLET | Refills: 3 | Status: SHIPPED | OUTPATIENT
Start: 2025-01-13

## 2025-01-13 RX ORDER — DUTASTERIDE 0.5 MG/1
0.5 CAPSULE, LIQUID FILLED ORAL DAILY
Qty: 90 CAPSULE | Refills: 3 | Status: SHIPPED | OUTPATIENT
Start: 2025-01-13

## 2025-01-13 ASSESSMENT — SOCIAL DETERMINANTS OF HEALTH (SDOH): HOW OFTEN DO YOU GET TOGETHER WITH FRIENDS OR RELATIVES?: ONCE A WEEK

## 2025-01-13 ASSESSMENT — PAIN SCALES - GENERAL: PAINLEVEL_OUTOF10: NO PAIN (0)

## 2025-01-13 NOTE — PROGRESS NOTES
Preventive Care Visit  [facility]  MIKE SALGUERO DO, Family Medicine  Jan 13, 2025      Assessment & Plan     Encounter for preventive care    Change in stool  I suspect he is constipated due to low water and dietary fiber intake.  Discussed increasing water, fiber, can try some prune juice, change to MiraLAX 1 scoop daily instead of Metamucil.  He prefers to try that instead of Milk of Mag and/or Mag Citrate.  Past due for screening colonoscopy as well  - Colonoscopy Screening  Referral; Future  - XR ABDOMEN 1 VIEW (Clinic Performed); Future - showing stool  - TSH with free T4 reflex    Mixed hyperlipidemia  Refill meds pending labs.  - simvastatin (ZOCOR) 20 MG tablet; Take 1 tablet (20 mg) by mouth at bedtime.  - fenofibrate (TRICOR) 145 MG tablet; Take 1 tablet (145 mg) by mouth daily.  - TSH with free T4 reflex  - Comprehensive metabolic panel  - CBC with Platelets & Differential  - Lipid Profile    FH: heart disease  - simvastatin (ZOCOR) 20 MG tablet; Take 1 tablet (20 mg) by mouth at bedtime.  - aspirin (ASA) 81 MG chewable tablet; Take 1 tablet (81 mg) by mouth daily.    Benign non-nodular prostatic hyperplasia  - dutasteride (AVODART) 0.5 MG capsule; Take 1 capsule (0.5 mg) by mouth daily.    Peyronie's disease  Stable on OTC Vit E    Need for hepatitis C screening test  - Hepatitis C Screen Reflex to HCV RNA Quant and Genotype    Encounter for screening colonoscopy  - Colonoscopy Screening  Referral; Future    Screening PSA (prostate specific antigen)  - Prostate Specific Antigen Screen    Screening for HIV (human immunodeficiency virus)  - HIV Antigen Antibody Combo Cascade    Screening for diabetes mellitus      Counseling  Appropriate preventive services were addressed with this patient via screening, questionnaire, or discussion as appropriate for fall prevention, nutrition, physical activity, Tobacco-use cessation, social engagement, weight loss and cognition.  Checklist  "reviewing preventive services available has been given to the patient.  Reviewed patient's diet, addressing concerns and/or questions.   The patient was instructed to see the dentist every 6 months.   The patient reports drinking more than 3 alcoholic drinks per day and/or more than 7 drhnks per week. The patient was counseled and given information about possible harmful effects of excessive alcohol intake.      Vaccines reviewed and current.      Homa Ruiz is a 64 year old, presenting for the following:  Physical        1/13/2025    10:56 AM   Additional Questions   Roomed by Heath Mendiola lpn   Accompanied by self          HPI    HLP - on simvastatin and fenofibrate    Vitamin E - stopping his curved erections from getting worse, but not making better    BPH - Avodart - states started as enlarged on exam, wasn't LUTS    Concern:  gassy a lot, liquid seepage when has gas or when needs to go to bathroom (more of a sweating feeling as opposed to actual leakage), constipated at times, metamucil was once a day, now twice a day helping some.  BM's once a day chunks.  Was worse over the holidays with \"all the bad food\".  States just sent his notice that he's due for a colonoscopy.  Drinks about 1/4 gallon per day - water bloats him.  Serving of veggies with dinner most days.  Really no fruit.  Stools better when at [facility] - with great discussion he recalls he eats healthier at the [facility] as they have fresh veggies there.    Past colon issues - perforation from diverticulitis      Hyperlipidemia Follow-Up    Are you regularly taking any medication or supplement to lower your cholesterol?   Yes- fenobibrate and simvastatin  Are you having muscle aches or other side effects that you think could be caused by your cholesterol lowering medication?  No    Health Care Directive  Patient does not have a Health Care Directive: Discussed advance care planning with patient; however, patient declined at this " time.      1/13/2025   General Health   How would you rate your overall physical health? Good   Feel stress (tense, anxious, or unable to sleep) Only a little   (!) STRESS CONCERN      1/13/2025   Nutrition   Three or more servings of calcium each day? Yes   Diet: Regular (no restrictions)   How many servings of fruit and vegetables per day? (!) 2-3   How many sweetened beverages each day? (!) 2         1/11/2024   Exercise   Frequency of exercise: 6-7 days/week     Goes for a walk daily - half mile        1/13/2025   Social Factors   Frequency of gathering with friends or relatives Once a week   Worry food won't last until get money to buy more No   Food not last or not have enough money for food? No   Do you have housing? (Housing is defined as stable permanent housing and does not include staying ouside in a car, in a tent, in an abandoned building, in an overnight shelter, or couch-surfing.) Yes   Are you worried about losing your housing? No   Lack of transportation? No   Unable to get utilities (heat,electricity)? No         1/13/2025   Fall Risk   Fallen 2 or more times in the past year? No   Trouble with walking or balance? No          1/13/2025   Dental   Dentist two times every year? (!) NO     Dentist December 2024 - annual  Eye exam - annual        1/13/2025   TB Screening   Were you born outside of the [place]? No         Today's PHQ-2 Score:       1/13/2025    11:05 AM   PHQ-2 ( 1999 [employer])   Q1: Little interest or pleasure in doing things 0   Q2: Feeling down, depressed or hopeless 0   PHQ-2 Score 0    Q1: Little interest or pleasure in doing things Not at all   Q2: Feeling down, depressed or hopeless Not at all   PHQ-2 Score 0       Patient-reported           1/13/2025   Substance Use   Alcohol more than 3/day or more than 7/wk Yes   How often do you have a drink containing alcohol 2 to 4 times a month   How many alcohol drinks on typical day 1 or 2   How often do you have 5+ drinks at one occasion Never    Audit 2/3 Score 0   How often not able to stop drinking once started Never   How often failed to do what normally expected Never   How often needed first drink in am after a heavy drinking session Never   How often feeling of guilt or remorse after drinking Never   How often unable to remember what happened the night before Never   Have you or someone else been injured because of your drinking No   Has anyone been concerned or suggested you cut down on drinking No   TOTAL SCORE - AUDIT 2   Do you use any other substances recreationally? No     Social History     Tobacco Use    Smoking status: Former     Current packs/day: 0.00     Average packs/day: 1 pack/day for 30.0 years (30.0 ttl pk-yrs)     Types: Cigarettes     Start date: 1978     Quit date: 2008     Years since quittin.0    Smokeless tobacco: Former     Quit date:    Vaping Use    Vaping status: Never Used   Substance Use Topics    Alcohol use: Yes     Alcohol/week: 7.0 standard drinks of alcohol     Types: 7 Standard drinks or equivalent per week     Comment: socially    Drug use: Not Currently     Types: Marijuana     Comment: occasional marijuana             2025   One time HIV Screening   Previous HIV test? I don't know         2025   STI Screening   New sexual partner(s) since last STI/HIV test? No   Last PSA:   PSA   Date Value Ref Range Status   2021 0.48 0 - 4 ug/L Final     Comment:     Assay Method:  Chemiluminescence using Siemens Vista analyzer     PSA Tumor Marker   Date Value Ref Range Status   2024 0.33 0.00 - 4.50 ng/mL Final   2022 0.56 0.00 - 4.00 ug/L Final     ASCVD Risk   The 10-year ASCVD risk score (Payton JOHNSON, et al., 2019) is: 9.5%    Values used to calculate the score:      Age: 64 years      Sex: Male      Is Non- : No      Diabetic: No      Tobacco smoker: No      Systolic Blood Pressure: 126 mmHg      Is BP treated: No      HDL Cholesterol: 53 mg/dL    "   Total Cholesterol: 180 mg/dL           Reviewed and updated as needed this visit by Provider   Tobacco  Allergies  Meds   Med Hx  Surg Hx  Fam Hx                  Review of Systems  Constitutional, HEENT, cardiovascular, pulmonary, GI, , musculoskeletal, neuro, skin, endocrine and psych systems are negative, except as otherwise noted.     Objective    Exam  /68 (BP Location: Left arm, Patient Position: Sitting, Cuff Size: Adult Regular)   Pulse 76   Temp 97.5  F (36.4  C) (Tympanic)   Resp 16   Ht 1.867 m (6' 1.5\")   Wt 102 kg (224 lb 12.8 oz)   SpO2 98%   BMI 29.26 kg/m     Estimated body mass index is 29.26 kg/m  as calculated from the following:    Height as of this encounter: 1.867 m (6' 1.5\").    Weight as of this encounter: 102 kg (224 lb 12.8 oz).    Physical Exam  Constitutional:       General: He is not in acute distress.     Appearance: Normal appearance.   HENT:      Head: Normocephalic and atraumatic.      Right Ear: Tympanic membrane and ear canal normal.      Left Ear: Tympanic membrane and ear canal normal.      Mouth/Throat:      Mouth: Mucous membranes are moist.      Pharynx: Oropharynx is clear.   Eyes:      Extraocular Movements: Extraocular movements intact.      Conjunctiva/sclera: Conjunctivae normal.      Pupils: Pupils are equal, round, and reactive to light.   Neck:      Vascular: No carotid bruit.   Cardiovascular:      Rate and Rhythm: Normal rate and regular rhythm.      Heart sounds: Normal heart sounds. No murmur heard.  Pulmonary:      Effort: Pulmonary effort is normal.      Breath sounds: Normal breath sounds. No wheezing, rhonchi or rales.   Abdominal:      General: Bowel sounds are normal.      Palpations: Abdomen is soft.      Tenderness: There is no abdominal tenderness. There is no guarding.   Genitourinary:     Comments: Prostate somewhat enlarged, no palpable mass, no tenderness  Musculoskeletal:         General: Normal range of motion.      Cervical " back: Normal range of motion.      Right lower leg: No edema.      Left lower leg: No edema.   Lymphadenopathy:      Cervical: No cervical adenopathy.   Skin:     General: Skin is warm and dry.   Neurological:      Mental Status: He is alert and oriented to person, place, and time.      Cranial Nerves: No cranial nerve deficit.      Deep Tendon Reflexes: Reflexes normal.   Psychiatric:         Mood and Affect: Mood normal.         Behavior: Behavior normal.         Thought Content: Thought content normal.         Judgment: Judgment normal.               Signed Electronically by: MIKE SALGUERO DO

## 2025-01-14 LAB — HCV AB SERPL QL IA: NONREACTIVE

## 2025-01-15 LAB — HIV 1+2 AB+HIV1 P24 AG SERPL QL IA: NONREACTIVE

## 2025-01-21 ENCOUNTER — PREP FOR PROCEDURE (OUTPATIENT)
Dept: SURGERY | Facility: OTHER | Age: 65
End: 2025-01-21

## 2025-01-21 ENCOUNTER — OFFICE VISIT (OUTPATIENT)
Dept: SURGERY | Facility: OTHER | Age: 65
End: 2025-01-21
Attending: SURGERY
Payer: COMMERCIAL

## 2025-01-21 VITALS
DIASTOLIC BLOOD PRESSURE: 74 MMHG | WEIGHT: 224 LBS | RESPIRATION RATE: 15 BRPM | SYSTOLIC BLOOD PRESSURE: 132 MMHG | HEART RATE: 72 BPM | TEMPERATURE: 97.7 F | HEIGHT: 73 IN | BODY MASS INDEX: 29.69 KG/M2 | OXYGEN SATURATION: 96 %

## 2025-01-21 DIAGNOSIS — R19.5 CHANGE IN STOOL: ICD-10-CM

## 2025-01-21 DIAGNOSIS — Z12.11 ENCOUNTER FOR SCREENING COLONOSCOPY: ICD-10-CM

## 2025-01-21 DIAGNOSIS — Z12.11 SCREEN FOR COLON CANCER: Primary | ICD-10-CM

## 2025-01-21 RX ORDER — BISACODYL 5 MG/1
TABLET, DELAYED RELEASE ORAL
Qty: 4 TABLET | Refills: 0 | Status: SHIPPED | OUTPATIENT
Start: 2025-01-21

## 2025-01-21 ASSESSMENT — PAIN SCALES - GENERAL: PAINLEVEL_OUTOF10: NO PAIN (0)

## 2025-01-21 NOTE — PROGRESS NOTES
CLINIC NOTE - CONSULT  1/21/2025    Patient:Joseph Stanford  Referring Physician:  Dr. Tucker    Reason for Referral: Change in bowel movements, need for screening colonoscopy    HPI:  Joseph Stanford is a very pleasant 64 year old male referred to [facility] for evaluation of colonoscopy.    Patient is s/p perforated diverticulitis with Reuben's procedure and subsequent reversal in 2000s.  Last colonoscopy was 2015 and no polyps were found.  Patient denies family history of colon cancer.  He has been on miralax since his colon surgery for what sounds like secondary prevention of recurrent diverticulitis.  This will get him constipated at times.  He states he recently switched to benefiber and his constipation has resolved.  He does not take blood thinners.  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    Past Medical History:  Past Medical History:   Diagnosis Date    BPH (benign prostatic hypertrophy) 8/14/2012    Diverticulosis 12/20/2011 2003    Mixed hyperlipidemia 2/8/2011    Peyronie's disease 12/20/2011    Tobacco abuse, in remission     Tobacco use disorder 2/8/2011    Quit 8/2013       Past Surgical History:  Past Surgical History:   Procedure Laterality Date    ARTHROSCOPY KNEE  1980    left    ARTHROSCOPY KNEE  2000    left    colon resection  2004    COLONOSCOPY  2003    repeat 2013    COLONOSCOPY N/A 2/5/2015    Repeat in 2025//Procedure: COLONOSCOPY;  Surgeon: Keshia Vivar MD;  Location: [facility]    COLOSTOMY  2003    Diverticulosis with perforation    PHACOEMULSIFICATION WITH STANDARD INTRAOCULAR LENS IMPLANT Right 2/26/2019    Procedure: PHACOEMULSIFICATION CATARACT EXTRACTION POSTERIOR CHAMBER LENS RIGHT;  Surgeon: Shankar Cross MD;  Location: [facility]    PHACOEMULSIFICATION WITH STANDARD INTRAOCULAR LENS IMPLANT Left  "2/27/2023    Procedure: PHACOEMULSIFICATION CATARACT EXTRACTION POSTERIOR CHAMBER LENS LEFT EYE;  Surgeon: Shankar Cross MD;  Location: [facility]       Family History History:  Family History   Problem Relation Age of Onset    Alzheimer Disease Mother     Diabetes Father         type 2    Heart Disease Father         myocardial infarction    Breast Cancer Sister     Heart Disease Brother 61        myocardial infarction; cause of death       History of Tobacco Use:  History   Smoking Status    Former    Types: Cigarettes   Smokeless Tobacco    Former    Quit date: 2015       Current Medications:  Current Outpatient Medications   Medication Sig Dispense Refill    aspirin (ASA) 81 MG chewable tablet Take 1 tablet (81 mg) by mouth daily. 90 tablet 3    dutasteride (AVODART) 0.5 MG capsule Take 1 capsule (0.5 mg) by mouth daily. 90 capsule 3    fenofibrate (TRICOR) 145 MG tablet Take 1 tablet (145 mg) by mouth daily. 90 tablet 3    simvastatin (ZOCOR) 20 MG tablet Take 1 tablet (20 mg) by mouth at bedtime. 90 tablet 1    VITAMIN E NATURAL PO Take 400 Units by mouth daily         Allergies:  No Known Allergies    ROS:  Pertinent items are noted in HPI.    PHYSICAL EXAM:     Vital signs: /74 (BP Location: Right arm, Cuff Size: Adult Regular)   Pulse 72   Temp 97.7  F (36.5  C) (Tympanic)   Resp 15   Ht 1.854 m (6' 1\")   Wt 101.6 kg (224 lb)   SpO2 96%   BMI 29.55 kg/m     Weight: [unfilled]   BMI: Body mass index is 29.55 kg/m .   General: No apparent distress   Skin: No rashes or readily discernible lesions   Neck: Neck supple, symmetric and without palpable adenopathy or masses   Lungs: Nonlabored breathing on room air   CV: Regular rate and rythm   Abdominal: Soft, nontender, nondistended   Extremities: No peripheral edema    Labs:  Reviewed.  Cr 1.2    Imaging:  N/a    ASSESSMENT:  Joseph Stanford is a very pleasant 64 year old male presenting for screening colonoscopy.    Surgical history and risk factors as " mentioned in HPI.  Since patient's constipation has resolved, I think the indication for colonoscopy is more for screening purposes.  I discussed with patient risks and benefits of colonoscopy including bleeding, missed lesion, and perforation.  Patient demonstrated understanding and wishes to proceed.    PLAN:  Proceed with colonoscopy at patient's convenience  Preop ordered.

## 2025-01-21 NOTE — PATIENT INSTRUCTIONS
Thank you for allowing Dr. Richie Burnett and our surgical team to participate in your care. Please call our health unit coordinator at [phone] with scheduling questions or the nurse at [phone] with any other questions or concerns.      You have been scheduled for: COLONOSCOPY with Dr. Richie Burnett on February 13th.     You will be notified the weekday before the procedure for your check in time, you can also call admitting at  after 5:00PM    You will need to have and adult  to bring you home.    Surgery Education nurse (PAT phone call) to call one week prior to procedure, If you do not hear from them you can call them at this number    February 6th 11:30    You will use GOLYTELY bowel prep.    Please see handout for additional instruction.    You WILL need a pre-operative appointment with your primary care provider.    You may call [phone] or [phone] with any questions.

## 2025-01-30 ENCOUNTER — OFFICE VISIT (OUTPATIENT)
Dept: FAMILY MEDICINE | Facility: OTHER | Age: 65
End: 2025-01-30
Attending: FAMILY MEDICINE
Payer: COMMERCIAL

## 2025-01-30 VITALS
RESPIRATION RATE: 16 BRPM | OXYGEN SATURATION: 98 % | SYSTOLIC BLOOD PRESSURE: 122 MMHG | WEIGHT: 224 LBS | BODY MASS INDEX: 28.75 KG/M2 | HEART RATE: 67 BPM | TEMPERATURE: 97.9 F | DIASTOLIC BLOOD PRESSURE: 84 MMHG | HEIGHT: 74 IN

## 2025-01-30 DIAGNOSIS — N18.2 STAGE 2 CHRONIC KIDNEY DISEASE: Primary | ICD-10-CM

## 2025-01-30 DIAGNOSIS — Z01.818 PREOPERATIVE EXAMINATION: Primary | ICD-10-CM

## 2025-01-30 DIAGNOSIS — Z12.11 ENCOUNTER FOR SCREENING COLONOSCOPY: ICD-10-CM

## 2025-01-30 DIAGNOSIS — N18.9 CHRONIC KIDNEY DISEASE, UNSPECIFIED CKD STAGE: ICD-10-CM

## 2025-01-30 LAB
ALBUMIN UR-MCNC: NEGATIVE MG/DL
ANION GAP SERPL CALCULATED.3IONS-SCNC: 10 MMOL/L (ref 7–15)
APPEARANCE UR: CLEAR
BILIRUB UR QL STRIP: NEGATIVE
BUN SERPL-MCNC: 20.1 MG/DL (ref 8–23)
CALCIUM SERPL-MCNC: 9.7 MG/DL (ref 8.8–10.4)
CHLORIDE SERPL-SCNC: 102 MMOL/L (ref 98–107)
COLOR UR AUTO: ABNORMAL
CREAT SERPL-MCNC: 1.26 MG/DL (ref 0.67–1.17)
EGFRCR SERPLBLD CKD-EPI 2021: 64 ML/MIN/1.73M2
GLUCOSE SERPL-MCNC: 102 MG/DL (ref 70–99)
GLUCOSE UR STRIP-MCNC: NEGATIVE MG/DL
HCO3 SERPL-SCNC: 27 MMOL/L (ref 22–29)
HGB UR QL STRIP: NEGATIVE
KETONES UR STRIP-MCNC: NEGATIVE MG/DL
LEUKOCYTE ESTERASE UR QL STRIP: NEGATIVE
MUCOUS THREADS #/AREA URNS LPF: PRESENT /LPF
NITRATE UR QL: NEGATIVE
PH UR STRIP: 5.5 [PH] (ref 4.7–8)
POTASSIUM SERPL-SCNC: 4.2 MMOL/L (ref 3.4–5.3)
RBC URINE: 1 /HPF
SODIUM SERPL-SCNC: 139 MMOL/L (ref 135–145)
SP GR UR STRIP: 1.01 (ref 1–1.03)
SQUAMOUS EPITHELIAL: 0 /HPF
UROBILINOGEN UR STRIP-MCNC: NORMAL MG/DL
WBC URINE: 0 /HPF

## 2025-01-30 PROCEDURE — 99213 OFFICE O/P EST LOW 20 MIN: CPT | Performed by: FAMILY MEDICINE

## 2025-01-30 PROCEDURE — 80048 BASIC METABOLIC PNL TOTAL CA: CPT | Performed by: FAMILY MEDICINE

## 2025-01-30 PROCEDURE — 81001 URINALYSIS AUTO W/SCOPE: CPT | Performed by: FAMILY MEDICINE

## 2025-01-30 PROCEDURE — 36415 COLL VENOUS BLD VENIPUNCTURE: CPT | Performed by: FAMILY MEDICINE

## 2025-01-30 ASSESSMENT — PAIN SCALES - GENERAL: PAINLEVEL_OUTOF10: NO PAIN (0)

## 2025-02-05 ENCOUNTER — ANESTHESIA EVENT (OUTPATIENT)
Dept: SURGERY | Facility: HOSPITAL | Age: 65
End: 2025-02-05
Payer: COMMERCIAL

## 2025-02-05 RX ORDER — DEXAMETHASONE SODIUM PHOSPHATE 10 MG/ML
4 INJECTION, SOLUTION INTRAMUSCULAR; INTRAVENOUS
Status: CANCELLED | OUTPATIENT
Start: 2025-02-05

## 2025-02-05 RX ORDER — NALOXONE HYDROCHLORIDE 0.4 MG/ML
0.1 INJECTION, SOLUTION INTRAMUSCULAR; INTRAVENOUS; SUBCUTANEOUS
Status: CANCELLED | OUTPATIENT
Start: 2025-02-05

## 2025-02-05 RX ORDER — ONDANSETRON 2 MG/ML
4 INJECTION INTRAMUSCULAR; INTRAVENOUS EVERY 30 MIN PRN
Status: CANCELLED | OUTPATIENT
Start: 2025-02-05

## 2025-02-05 RX ORDER — ONDANSETRON 4 MG/1
4 TABLET, ORALLY DISINTEGRATING ORAL EVERY 30 MIN PRN
Status: CANCELLED | OUTPATIENT
Start: 2025-02-05

## 2025-02-05 ASSESSMENT — LIFESTYLE VARIABLES: TOBACCO_USE: 1

## 2025-02-05 NOTE — ANESTHESIA PREPROCEDURE EVALUATION
Anesthesia Pre-Procedure Evaluation    Patient: Joseph Stanford   MRN: [id number] : 1960        Procedure : Procedure(s):  COLONOSCOPY          Past Medical History:   Diagnosis Date     BPH (benign prostatic hypertrophy) 2012     Diverticulosis 2011    2003     Mixed hyperlipidemia 2011     Peyronie's disease 2011     Tobacco abuse, in remission      Tobacco use disorder 2011    Quit 2013      Past Surgical History:   Procedure Laterality Date     ARTHROSCOPY KNEE      left     ARTHROSCOPY KNEE      left     colon resection       COLONOSCOPY      repeat      COLONOSCOPY N/A 2015    Repeat in Procedure: COLONOSCOPY;  Surgeon: Keshia Vivar MD;  Location: [facility]     COLOSTOMY  2003    Diverticulosis with perforation     PHACOEMULSIFICATION WITH STANDARD INTRAOCULAR LENS IMPLANT Right 2019    Procedure: PHACOEMULSIFICATION CATARACT EXTRACTION POSTERIOR CHAMBER LENS RIGHT;  Surgeon: Shankar Cross MD;  Location: [facility]     PHACOEMULSIFICATION WITH STANDARD INTRAOCULAR LENS IMPLANT Left 2023    Procedure: PHACOEMULSIFICATION CATARACT EXTRACTION POSTERIOR CHAMBER LENS LEFT EYE;  Surgeon: Shankar Cross MD;  Location: [facility]      No Known Allergies   Social History     Tobacco Use     Smoking status: Former     Current packs/day: 0.00     Average packs/day: 1 pack/day for 30.0 years (30.0 ttl pk-yrs)     Types: Cigarettes     Start date: 1978     Quit date: 2008     Years since quittin.1     Smokeless tobacco: Former     Quit date:    Substance Use Topics     Alcohol use: Yes     Alcohol/week: 7.0 standard drinks of alcohol     Types: 7 Standard drinks or equivalent per week     Comment: socially      Wt Readings from Last 1 Encounters:   25 101.6 kg (224 lb)        Anesthesia Evaluation   Pt has had prior anesthetic. Type: MAC and General.    No history of anesthetic complications       ROS/MED HX  ENT/Pulmonary:      (+)     SOFIA risk factors, snores loudly,          tobacco use, Past use,  30  Pack-Year Hx,                      Neurologic: Comment: Cervical radiculopathy   DDD, cervical       Cardiovascular:     (+) Dyslipidemia - -   -  - -   Taking blood thinners Pt has received instructions: Instructions Given to patient: asa - hold 7 days prior.                            Previous cardiac testing   Echo: Date: Results:    Stress Test:  Date: 2016 Results:  1. No evidence of myocardial ischemia.  2. Normal left ventricular function.    ECG Reviewed:  Date: 2/2023 Results:  HR 56, sinus camila, RBBB  Cath:  Date: Results:      METS/Exercise Tolerance:     Hematologic:  - neg hematologic  ROS     Musculoskeletal: Comment: DDD, cervical radiculopathy  (+)  arthritis,          Fracture: hx lumbar transverse process fracture.   GI/Hepatic: Comment: Hx colostomy 2003 for diverticulosis with perforation s/p reversal    (+)        bowel prep,            Renal/Genitourinary:     (+) renal disease, type: CRI,      BPH,      Endo:  - neg endo ROS     Psychiatric/Substance Use:     (+)   alcohol abuse  Recreational drug usage: Cannabis.    Infectious Disease:  - neg infectious disease ROS     Malignancy:   (+) Malignancy, History of Skin.Skin CA Remission status post Surgery.      Other:  - neg other ROS          Physical Exam    Airway  airway exam normal      Mallampati: II   TM distance: > 3 FB   Neck ROM: full   Mouth opening: > 3 cm    Respiratory Devices and Support         Dental       (+) Minor Abnormalities - some fillings, tiny chips      Cardiovascular   cardiovascular exam normal       Rhythm and rate: regular and normal     Pulmonary   pulmonary exam normal        breath sounds clear to auscultation       OUTSIDE LABS:  CBC:   Lab Results   Component Value Date    WBC 4.9 01/13/2025    WBC 5.3 01/11/2024    HGB 14.2 01/13/2025    HGB 14.2 01/11/2024    HCT 40.6 01/13/2025    HCT 42.0 01/11/2024     01/13/2025    PLT  "262 01/11/2024     BMP:   Lab Results   Component Value Date     01/30/2025     01/13/2025    POTASSIUM 4.2 01/30/2025    POTASSIUM 4.7 01/13/2025    CHLORIDE 102 01/30/2025    CHLORIDE 102 01/13/2025    CO2 27 01/30/2025    CO2 27 01/13/2025    BUN 20.1 01/30/2025    BUN 18.7 01/13/2025    CR 1.26 (H) 01/30/2025    CR 1.28 (H) 01/13/2025     (H) 01/30/2025    GLC 87 01/13/2025     COAGS: No results found for: \"PTT\", \"INR\", \"FIBR\"  POC:   Lab Results   Component Value Date     (H) 01/28/2014     HEPATIC:   Lab Results   Component Value Date    ALBUMIN 4.7 01/13/2025    PROTTOTAL 7.5 01/13/2025    ALT 30 01/13/2025    AST 25 01/13/2025    ALKPHOS 25 (L) 01/13/2025    BILITOTAL 0.5 01/13/2025     OTHER:   Lab Results   Component Value Date    CHANTEL 9.7 01/30/2025    TSH 1.69 01/13/2025       Anesthesia Plan    ASA Status:  3    NPO Status:  NPO Appropriate    Anesthesia Type: MAC.     - Reason for MAC: straight local not clinically adequate              Consents    Anesthesia Plan(s) and associated risks, benefits, and realistic alternatives discussed. Questions answered and patient/representative(s) expressed understanding.     - Discussed: Risks, Benefits and Alternatives for BOTH SEDATION and the PROCEDURE were discussed     - Discussed with:  Patient      - Extended Intubation/Ventilatory Support Discussed: No.      - Patient is DNR/DNI Status: No     Use of blood products discussed: No .     Postoperative Care            Comments:    Other Comments: Chart reviewed hl - need to review 1/30  ARMANDO Olmstead CNP    I have reviewed the pertinent notes and labs in the chart from the past 30 days.  Any updates or changes from those notes are reflected in this note.    Clinically Significant Risk Factors Present on Admission                             # Overweight: Estimated body mass index is 29.15 kg/m  as calculated from the following:    Height as of 1/30/25: 1.867 " "gloria (6' 1.5\").    Weight as of 1/30/25: 101.6 kg (224 lb).                "

## 2025-02-09 ASSESSMENT — ENCOUNTER SYMPTOMS
PALPITATIONS: 0
HEADACHES: 0
SORE THROAT: 0
COUGH: 0
FEVER: 0
DIZZINESS: 0
SHORTNESS OF BREATH: 0

## 2025-02-13 ENCOUNTER — HOSPITAL ENCOUNTER (OUTPATIENT)
Facility: HOSPITAL | Age: 65
Discharge: HOME | End: 2025-02-13
Attending: SURGERY | Admitting: SURGERY
Payer: COMMERCIAL

## 2025-02-13 ENCOUNTER — ANESTHESIA (OUTPATIENT)
Dept: SURGERY | Facility: HOSPITAL | Age: 65
End: 2025-02-13
Payer: COMMERCIAL

## 2025-02-13 VITALS
OXYGEN SATURATION: 98 % | BODY MASS INDEX: 29.03 KG/M2 | HEART RATE: 78 BPM | HEIGHT: 73 IN | TEMPERATURE: 98.2 F | RESPIRATION RATE: 16 BRPM | SYSTOLIC BLOOD PRESSURE: 113 MMHG | DIASTOLIC BLOOD PRESSURE: 87 MMHG | WEIGHT: 219 LBS

## 2025-02-13 PROCEDURE — 999N000141 HC STATISTIC PRE-PROCEDURE NURSING ASSESSMENT: Performed by: SURGERY

## 2025-02-13 PROCEDURE — 45380 COLONOSCOPY AND BIOPSY: CPT | Mod: PT | Performed by: SURGERY

## 2025-02-13 PROCEDURE — 250N000011 HC RX IP 250 OP 636: Performed by: NURSE ANESTHETIST, CERTIFIED REGISTERED

## 2025-02-13 PROCEDURE — 88305 TISSUE EXAM BY PATHOLOGIST: CPT | Mod: 26 | Performed by: PATHOLOGY

## 2025-02-13 PROCEDURE — 272N000001 HC OR GENERAL SUPPLY STERILE: Performed by: SURGERY

## 2025-02-13 PROCEDURE — 710N000012 HC RECOVERY PHASE 2, PER MINUTE: Performed by: SURGERY

## 2025-02-13 PROCEDURE — 370N000017 HC ANESTHESIA TECHNICAL FEE, PER MIN: Performed by: SURGERY

## 2025-02-13 PROCEDURE — 88305 TISSUE EXAM BY PATHOLOGIST: CPT | Mod: TC | Performed by: SURGERY

## 2025-02-13 PROCEDURE — 250N000009 HC RX 250: Performed by: NURSE ANESTHETIST, CERTIFIED REGISTERED

## 2025-02-13 PROCEDURE — 258N000003 HC RX IP 258 OP 636: Performed by: NURSE PRACTITIONER

## 2025-02-13 PROCEDURE — 360N000075 HC SURGERY LEVEL 2, PER MIN: Performed by: SURGERY

## 2025-02-13 RX ORDER — NALOXONE HYDROCHLORIDE 0.4 MG/ML
0.2 INJECTION, SOLUTION INTRAMUSCULAR; INTRAVENOUS; SUBCUTANEOUS
Status: CANCELLED | OUTPATIENT
Start: 2025-02-13

## 2025-02-13 RX ORDER — SODIUM CHLORIDE, SODIUM LACTATE, POTASSIUM CHLORIDE, CALCIUM CHLORIDE 600; 310; 30; 20 MG/100ML; MG/100ML; MG/100ML; MG/100ML
INJECTION, SOLUTION INTRAVENOUS CONTINUOUS
Status: DISCONTINUED | OUTPATIENT
Start: 2025-02-13 | End: 2025-02-13 | Stop reason: HOSPADM

## 2025-02-13 RX ORDER — LIDOCAINE HYDROCHLORIDE 20 MG/ML
INJECTION, SOLUTION INFILTRATION; PERINEURAL PRN
Status: DISCONTINUED | OUTPATIENT
Start: 2025-02-13 | End: 2025-02-13

## 2025-02-13 RX ORDER — FLUMAZENIL 0.1 MG/ML
0.2 INJECTION, SOLUTION INTRAVENOUS
Status: CANCELLED | OUTPATIENT
Start: 2025-02-13 | End: 2025-02-13

## 2025-02-13 RX ORDER — NALOXONE HYDROCHLORIDE 0.4 MG/ML
0.4 INJECTION, SOLUTION INTRAMUSCULAR; INTRAVENOUS; SUBCUTANEOUS
Status: CANCELLED | OUTPATIENT
Start: 2025-02-13

## 2025-02-13 RX ORDER — PROPOFOL 10 MG/ML
INJECTION, EMULSION INTRAVENOUS CONTINUOUS PRN
Status: DISCONTINUED | OUTPATIENT
Start: 2025-02-13 | End: 2025-02-13

## 2025-02-13 RX ORDER — LIDOCAINE 40 MG/G
CREAM TOPICAL
Status: DISCONTINUED | OUTPATIENT
Start: 2025-02-13 | End: 2025-02-13 | Stop reason: HOSPADM

## 2025-02-13 RX ADMIN — PROPOFOL 200 MCG/KG/MIN: 10 INJECTION, EMULSION INTRAVENOUS at 10:24

## 2025-02-13 RX ADMIN — SODIUM CHLORIDE, POTASSIUM CHLORIDE, SODIUM LACTATE AND CALCIUM CHLORIDE: 600; 310; 30; 20 INJECTION, SOLUTION INTRAVENOUS at 10:20

## 2025-02-13 RX ADMIN — LIDOCAINE HYDROCHLORIDE 40 MG: 20 INJECTION, SOLUTION INFILTRATION; PERINEURAL at 10:23

## 2025-02-13 RX ADMIN — PROPOFOL 30 MG: 10 INJECTION, EMULSION INTRAVENOUS at 10:25

## 2025-02-13 ASSESSMENT — ACTIVITIES OF DAILY LIVING (ADL)
ADLS_ACUITY_SCORE: 41

## 2025-02-13 NOTE — ANESTHESIA CARE TRANSFER NOTE
Patient: Joseph Stanford    Procedure: Procedure(s):  COLONOSCOPY WITH POLYPECTOMY       Diagnosis: Screen for colon cancer [Z12.11]  Diagnosis Additional Information: No value filed.    Anesthesia Type:   MAC     Note:    Oropharynx: oropharynx clear of all foreign objects and spontaneously breathing  Level of Consciousness: drowsy  Oxygen Supplementation: room air    Independent Airway: airway patency satisfactory and stable  Dentition: dentition unchanged  Vital Signs Stable: post-procedure vital signs reviewed and stable  Report to RN Given: handoff report given  Patient transferred to: Phase II    Handoff Report: Identifed the Patient, Identified the Reponsible Provider, Reviewed the pertinent medical history, Discussed the surgical course, Reviewed Intra-OP anesthesia mangement and issues during anesthesia, Set expectations for post-procedure period and Allowed opportunity for questions and acknowledgement of understanding  Vitals:  Vitals Value Taken Time   BP 91/56 02/13/25 1045   Temp     Pulse 74 02/13/25 1045   Resp     SpO2 96 % 02/13/25 1046   Vitals shown include unfiled device data.    Electronically Signed By: ARMANDO Marie CRNA  February 13, 2025  10:47 AM

## 2025-02-13 NOTE — OP NOTE
REPORT OF OPERATION  DATE OF PROCEDURE: 2/13/2025    PATIENT: Joseph Stanford    SURGERY PERFORMED: Colonoscopy    PREOPERATIVE DIAGNOSIS: Screening colonoscopy    POSTOPERATIVE DIAGNOSIS:    Same   Normal colonoscopy   Diverticulosis was identified.   Hemorrhoids were not  identified.    SURGEON: Richie Burnett MD    ASSISTANTS: None    ANESTHESIA: Monitored Anesthesia Care    COMPLICATIONS: None apparent    ESTIMATED BLOOD LOSS: * No values recorded between 2/13/2025 10:27 AM and 2/13/2025 10:42 AM *    TRANSFUSIONS: None    TISSUE TO PATHOLOGY: None    FINDINGS: Normal colonoscopy.  Diverticulosis was identified.  Hemorrhoids were not  identified.    INDICATIONS: This is a 65 year old male in need of a colonoscopy for Screening colonoscopy.  The patient will be taken to the endoscopy suite for that procedure.    DESCRIPTIONS OF PROCEDURE IN DETAIL: After consent was obtained the patient was taken to the endoscopy suite and placed in the left lateral decubitus position.  The patient was identified and the correct patient was confirmed.  Monitored Anesthesia Care was given.  A time out was performed verifying the correct patient and the correct procedure.  The entire operative team was in agreement.  All necessary equipment and supplies were in the room.    Rectal exam was performed and no lesions of the anal canal were noted.  The colonoscope was inserted into the anus and passed without difficulty to the cecum.  The cecum was identified by the ileocecal valve, the coalescence of the tinea and the appendiceal orifice.  Upon withdrawal all walls of the colon were visualized.  There was a 5 mm polyp in the cecum removed with cold biopsy forceps.  There was mild diverticulosis proximal to his anastomosis.  Diverticulosis was seen.  Upon reaching the rectum the scope was retroflexed and internal hemorrhoids were not  seen.  The scope was straightened back out and removed from the patient.  The patient was then  taken to the recovery room in stable condition tolerating the procedure well.      Prep: excellent    Withdrawal time was 10 minutes.    It is recommended that the patient have another colonoscopy in 10 years.

## 2025-02-13 NOTE — ANESTHESIA POSTPROCEDURE EVALUATION
Patient: Joseph Stanford    Procedure: Procedure(s):  COLONOSCOPY WITH POLYPECTOMY       Anesthesia Type:  MAC    Note:  Disposition: Outpatient   Postop Pain Control: Uneventful            Sign Out: Well controlled pain   PONV: No   Neuro/Psych: Uneventful            Sign Out: Acceptable/Baseline neuro status   Airway/Respiratory: Uneventful            Sign Out: Acceptable/Baseline resp. status   CV/Hemodynamics: Uneventful            Sign Out: Acceptable CV status; No obvious hypovolemia; No obvious fluid overload   Other NRE: NONE   DID A NON-ROUTINE EVENT OCCUR? No         Last vitals:  Vitals Value Taken Time   /87 02/13/25 1100   Temp 98.2  F (36.8  C) 02/13/25 1100   Pulse 78 02/13/25 1100   Resp 16 02/13/25 1100   SpO2 98 % 02/13/25 1101   Vitals shown include unfiled device data.    Electronically Signed By: ARMANDO Henry CRNA  February 13, 2025  11:38 AM

## 2025-02-13 NOTE — PROGRESS NOTES
Patient and responsible adult given discharge instructions with no questions regarding instructions. Becky score 19. Pain level 0/10.  Discharged from unit via ambulating . Patient discharged to home.

## 2025-02-21 ENCOUNTER — TELEPHONE (OUTPATIENT)
Dept: FAMILY MEDICINE | Facility: OTHER | Age: 65
End: 2025-02-21

## 2025-02-21 NOTE — TELEPHONE ENCOUNTER
9:39 AM    Reason for Call: Phone Call    Description: Patient called and states he received a letter from his insurance company stating that he is due for his yearly lung cancer screening. He is wondering if Dr. Tucker can put an order in for that? Please let him know.     Was an appointment offered for this call? No    Preferred method for responding to this message: Telephone Call  What is your phone number ?[phone]     If we cannot reach you directly, may we leave a detailed response at the number you provided? Yes    Can this message wait until your PCP/provider returns, if available today? Not applicable    Miranda Velasco

## 2025-02-22 NOTE — TELEPHONE ENCOUNTER
Lung screening is only done for 15 yrs after quit smoking.  During his physical back in January, he had told me he quit smoking in 2008.  So he no longer meets criteria for lung screening as he quit smoking 16-17 yrs ago depending on quit month.

## 2025-02-24 NOTE — TELEPHONE ENCOUNTER
I called patient and informed him of the information listed below. He verbalized understanding.

## 2025-04-07 ENCOUNTER — LAB (OUTPATIENT)
Dept: LAB | Facility: OTHER | Age: 65
End: 2025-04-07
Payer: COMMERCIAL

## 2025-04-07 LAB
ANION GAP SERPL CALCULATED.3IONS-SCNC: 9 MMOL/L (ref 7–15)
BUN SERPL-MCNC: 19.4 MG/DL (ref 8–23)
CALCIUM SERPL-MCNC: 9.8 MG/DL (ref 8.8–10.4)
CHLORIDE SERPL-SCNC: 102 MMOL/L (ref 98–107)
CREAT SERPL-MCNC: 1.22 MG/DL (ref 0.67–1.17)
EGFRCR SERPLBLD CKD-EPI 2021: 66 ML/MIN/1.73M2
GLUCOSE SERPL-MCNC: 97 MG/DL (ref 70–99)
HCO3 SERPL-SCNC: 28 MMOL/L (ref 22–29)
POTASSIUM SERPL-SCNC: 4.1 MMOL/L (ref 3.4–5.3)
SODIUM SERPL-SCNC: 139 MMOL/L (ref 135–145)

## 2025-04-07 PROCEDURE — 80048 BASIC METABOLIC PNL TOTAL CA: CPT

## 2025-04-07 PROCEDURE — 36415 COLL VENOUS BLD VENIPUNCTURE: CPT

## 2025-07-08 DIAGNOSIS — Z82.49 FH: HEART DISEASE: ICD-10-CM

## 2025-07-08 DIAGNOSIS — E78.2 MIXED HYPERLIPIDEMIA: ICD-10-CM

## 2025-07-08 RX ORDER — SIMVASTATIN 20 MG
20 TABLET ORAL AT BEDTIME
Qty: 90 TABLET | Refills: 1 | Status: SHIPPED | OUTPATIENT
Start: 2025-07-08

## (undated) DEVICE — FORCEPS BIOPSY RADIAL JAW 4 LARGE W/NEEDLE 240CM M00513332

## (undated) DEVICE — HANDPIECE-CAPSULEGUARD I/A STELLARIS

## (undated) DEVICE — BIN-LENS IMPLANT CART

## (undated) DEVICE — PACK EYE CUSTOM SEY32EPMBO

## (undated) DEVICE — PACK-EYE-CUSTOM

## (undated) DEVICE — CANNULA-NUCLEUS HYDRODISSECTOR

## (undated) DEVICE — EYE KNIFE MICRO 15DEG BEVEL 377516

## (undated) DEVICE — EYE PREP BETADINE 5% SOLUTION 30ML 0065-0411-30

## (undated) DEVICE — GLOVE PROTEXIS POWDER FREE 7.0 ORTHOPEDIC 2D73ET70

## (undated) DEVICE — BETADINE 5% STERILE OPHTHALMIC SOLUTION 1 OZ.

## (undated) DEVICE — SOL WATER IRRIG 1000ML BOTTLE 2F7114

## (undated) DEVICE — GLV-7.0 PROTEXIS PI CLASSIC LF/PF

## (undated) DEVICE — CONNECTOR ERBEFLO 2 PORT 20325-215

## (undated) DEVICE — CANISTER SUCTION MEDI-VAC GUARDIAN 2000ML 90D 65651-220

## (undated) DEVICE — INSTRUMENT WIPE VISIWIPE 581047

## (undated) DEVICE — LENS DELIVERY SYSTEM-SOFPORT LI61AO (EZ-28)

## (undated) DEVICE — INJECTOR PORT FOR IOL LENSES SOFPORT EZ-28

## (undated) DEVICE — TUBING SUCTION 20FT N620A

## (undated) DEVICE — SENSOR-OXISENSOR II ADULT

## (undated) DEVICE — CANNULA IRR 7/8IN 25GA VSTC VSCFL 45D 9MM DISP 585045

## (undated) DEVICE — GLOVE PROTEXIS POWDER FREE CLSC 7.5  2D72PL75X

## (undated) DEVICE — CANNULA IRR 7/8IN 30GA VSTC VSCFL 45D 9MM DISP 585046

## (undated) DEVICE — LABEL-STERILE PREPRINTED FOR OR

## (undated) DEVICE — EYE CANN IRR 25GA HYDRODISSECTING 585037

## (undated) DEVICE — CYSTOTOME-IRRIGATING  25G

## (undated) DEVICE — PACK PHACO STELLARIS VAC 1 AUX DRP 1 NDL WRNCH BL5110

## (undated) DEVICE — EYE KNIFE SLIT XSTAR VISITEC 2.8MM 45DEG 373728

## (undated) DEVICE — SET HANDPIECE IRRIG/ASPIRATION 2.2-2.8X5.4" 45DEG TIP 85910S

## (undated) DEVICE — PACK-PHACO STELLARIS

## (undated) DEVICE — EYE CANN IRR 25GA CYSTOTOME 581610

## (undated) DEVICE — INSTRUMENT WIPE-VISIWIPE

## (undated) DEVICE — KNIFE-KERATOME SLIT 2.8MM

## (undated) DEVICE — BIN-TECNIS DCB00 LENSES

## (undated) DEVICE — KNIFE-MICRO UNITOME 5.0MM

## (undated) DEVICE — BIN-CATARACT BIN BN37

## (undated) DEVICE — SUCTION MANIFOLD NEPTUNE 2 SYS 1 PORT 702-025-000

## (undated) DEVICE — GLV-7.5 PROTEXIS PI CLASSIC LF/PF

## (undated) DEVICE — BIN-CATARACT BIN

## (undated) RX ORDER — PROPOFOL 10 MG/ML
INJECTION, EMULSION INTRAVENOUS
Status: DISPENSED
Start: 2025-02-13